# Patient Record
Sex: FEMALE | Race: WHITE | Employment: OTHER | URBAN - METROPOLITAN AREA
[De-identification: names, ages, dates, MRNs, and addresses within clinical notes are randomized per-mention and may not be internally consistent; named-entity substitution may affect disease eponyms.]

---

## 2017-01-04 ENCOUNTER — TELEPHONE (OUTPATIENT)
Dept: INTERNAL MEDICINE CLINIC | Facility: CLINIC | Age: 72
End: 2017-01-04

## 2017-01-04 DIAGNOSIS — Z12.31 ENCOUNTER FOR SCREENING MAMMOGRAM FOR MALIGNANT NEOPLASM OF BREAST: Primary | ICD-10-CM

## 2017-01-04 NOTE — TELEPHONE ENCOUNTER
Hui Jett is calling in requesting an order for her mammogram. She received a notice from [facility] that she is due for her annual.    Pt will call central scheduling tomorrow to schedule appointment.

## 2017-01-11 ENCOUNTER — HOSPITAL ENCOUNTER (OUTPATIENT)
Dept: MAMMOGRAPHY | Age: 72
Discharge: HOME | End: 2017-01-11
Attending: INTERNAL MEDICINE
Payer: MEDICARE

## 2017-01-11 DIAGNOSIS — Z12.31 ENCOUNTER FOR SCREENING MAMMOGRAM FOR MALIGNANT NEOPLASM OF BREAST: ICD-10-CM

## 2017-01-11 PROCEDURE — 77063 BREAST TOMOSYNTHESIS BI: CPT

## 2017-01-11 PROCEDURE — 77067 SCR MAMMO BI INCL CAD: CPT

## 2017-03-15 ENCOUNTER — LAB ENCOUNTER (OUTPATIENT)
Dept: LAB | Age: 72
End: 2017-03-15
Attending: OBSTETRICS & GYNECOLOGY
Payer: MEDICARE

## 2017-03-15 DIAGNOSIS — C57.00 MALIGNANT NEOPLASM OF FALLOPIAN TUBE (HCC): Primary | ICD-10-CM

## 2017-03-15 LAB — CANCER AG 125 (CA125): 6 U/ML (ref ?–35)

## 2017-03-15 PROCEDURE — 86304 IMMUNOASSAY TUMOR CA 125: CPT

## 2017-03-24 ENCOUNTER — APPOINTMENT (OUTPATIENT)
Dept: LAB | Facility: HOSPITAL | Age: 72
End: 2017-03-24
Attending: INTERNAL MEDICINE
Payer: MEDICARE

## 2017-03-24 DIAGNOSIS — E78.00 HYPERCHOLESTEROLEMIA: ICD-10-CM

## 2017-03-24 LAB
ALT SERPL-CCNC: 22 U/L (ref 14–54)
AST SERPL-CCNC: 22 U/L (ref 15–41)
CHOLEST SMN-MCNC: 172 MG/DL (ref ?–200)
HDLC SERPL-MCNC: 87 MG/DL (ref 45–?)
HDLC SERPL: 1.98 {RATIO} (ref ?–4.44)
HSCRP: 0.85 MG/L (ref ?–3)
LDLC SERPL CALC-MCNC: 76 MG/DL (ref ?–130)
NONHDLC SERPL-MCNC: 85 MG/DL (ref ?–130)
TRIGLYCERIDES: 46 MG/DL (ref ?–150)
VLDL: 9 MG/DL (ref 5–40)

## 2017-03-24 PROCEDURE — 86141 C-REACTIVE PROTEIN HS: CPT

## 2017-03-24 PROCEDURE — 80061 LIPID PANEL: CPT

## 2017-03-24 PROCEDURE — 84450 TRANSFERASE (AST) (SGOT): CPT

## 2017-03-24 PROCEDURE — 84460 ALANINE AMINO (ALT) (SGPT): CPT

## 2017-03-24 PROCEDURE — 36415 COLL VENOUS BLD VENIPUNCTURE: CPT

## 2017-04-04 ENCOUNTER — OFFICE VISIT (OUTPATIENT)
Dept: INTERNAL MEDICINE CLINIC | Facility: CLINIC | Age: 72
End: 2017-04-04

## 2017-04-04 VITALS
SYSTOLIC BLOOD PRESSURE: 140 MMHG | HEIGHT: 65 IN | TEMPERATURE: 98 F | BODY MASS INDEX: 29.66 KG/M2 | HEART RATE: 64 BPM | DIASTOLIC BLOOD PRESSURE: 84 MMHG | RESPIRATION RATE: 16 BRPM | WEIGHT: 178 LBS

## 2017-04-04 DIAGNOSIS — Z85.43 HISTORY OF OVARIAN CANCER: ICD-10-CM

## 2017-04-04 DIAGNOSIS — R09.89 LABILE HYPERTENSION: Primary | ICD-10-CM

## 2017-04-04 DIAGNOSIS — N39.46 MIXED STRESS AND URGE URINARY INCONTINENCE: ICD-10-CM

## 2017-04-04 PROCEDURE — 99213 OFFICE O/P EST LOW 20 MIN: CPT | Performed by: INTERNAL MEDICINE

## 2017-04-05 PROBLEM — R32 INCONTINENCE: Status: ACTIVE | Noted: 2017-04-05

## 2017-04-05 RX ORDER — MIRABEGRON 50 MG/1
50 TABLET, FILM COATED, EXTENDED RELEASE ORAL DAILY
Qty: 90 TABLET | Refills: 0 | Status: SHIPPED | OUTPATIENT
Start: 2017-04-05 | End: 2017-04-13

## 2017-04-06 ENCOUNTER — TELEPHONE (OUTPATIENT)
Dept: INTERNAL MEDICINE CLINIC | Facility: CLINIC | Age: 72
End: 2017-04-06

## 2017-04-06 NOTE — PROGRESS NOTES
Patient presents with:  Lab Results: Labs 3/24 Liver, lipids, CRP       HPI: Arnaldo Das is here for follow up of labs and last visit. She is doing well and has no health complaints.      Hx ovarian cancer:  She is following up with Dr. Radhika Guerra every four luc day Disp:  Rfl:    aspirin 81 MG Oral Tab Take 81 mg by mouth daily. Disp:  Rfl:    Diclofenac Sodium (VOLTAREN) 1 % Transdermal Gel Apply 2 g topically 4 (four) times daily.  Disp: 100 g Rfl: 0       PFHSH:   Family History   Problem Relation Age of Onset

## 2017-04-07 ENCOUNTER — MED REC SCAN ONLY (OUTPATIENT)
Dept: OBGYN CLINIC | Facility: CLINIC | Age: 72
End: 2017-04-07

## 2017-04-12 ENCOUNTER — TELEPHONE (OUTPATIENT)
Dept: INTERNAL MEDICINE CLINIC | Facility: CLINIC | Age: 72
End: 2017-04-12

## 2017-04-12 DIAGNOSIS — R09.89 LABILE HYPERTENSION: Primary | ICD-10-CM

## 2017-04-13 RX ORDER — MIRABEGRON 50 MG/1
50 TABLET, FILM COATED, EXTENDED RELEASE ORAL DAILY
Qty: 90 TABLET | Refills: 0 | Status: SHIPPED | OUTPATIENT
Start: 2017-04-13 | End: 2017-08-16

## 2017-04-20 ENCOUNTER — TELEPHONE (OUTPATIENT)
Dept: INTERNAL MEDICINE CLINIC | Facility: CLINIC | Age: 72
End: 2017-04-20

## 2017-04-20 NOTE — TELEPHONE ENCOUNTER
Erick Murdock is calling in requesting a new prescription to replace the prescription for Myrbetriq. Her new insurance will not cover the Myrbetriq and when she went to pick it up the price was $314.00 for 30 days.  She cannot afford $314.00 a month for this medic

## 2017-04-20 NOTE — TELEPHONE ENCOUNTER
Have her try the Toviaz. 4 mg. She can gaming with one a day but can take up to two a day. If two a day works she can get a refill for 8 mg tabs. Give her 30 and 3 refills.

## 2017-06-21 ENCOUNTER — LAB ENCOUNTER (OUTPATIENT)
Dept: LAB | Age: 72
End: 2017-06-21
Attending: OBSTETRICS & GYNECOLOGY
Payer: MEDICARE

## 2017-06-21 DIAGNOSIS — C57.00 MALIGNANT NEOPLASM OF FALLOPIAN TUBE (HCC): Primary | ICD-10-CM

## 2017-06-21 PROCEDURE — 86304 IMMUNOASSAY TUMOR CA 125: CPT

## 2017-07-10 ENCOUNTER — MED REC SCAN ONLY (OUTPATIENT)
Dept: OBGYN CLINIC | Facility: CLINIC | Age: 72
End: 2017-07-10

## 2017-07-10 ENCOUNTER — OFFICE VISIT (OUTPATIENT)
Dept: GENETICS | Facility: HOSPITAL | Age: 72
End: 2017-07-10
Attending: GENETIC COUNSELOR, MS
Payer: MEDICARE

## 2017-07-10 DIAGNOSIS — Z85.44 HISTORY OF CANCER OF FALLOPIAN TUBE IN ADULTHOOD: Primary | ICD-10-CM

## 2017-07-10 PROCEDURE — 96040 HC GENETIC COUNSELING EA 30 MIN: CPT | Performed by: GENETIC COUNSELOR, MS

## 2017-07-10 NOTE — PROGRESS NOTES
Referring Provider: Chinedu Walker MD    Additional Provider: Carmel Crooks MD    Reason for Referral:  Marci Kelley was referred for genetic counseling because of a personal history of fallopian tube (ovarian) cancer.   Ms. Rosenda Garner is a 71year-old woman of the [place], approximately 1 in 66-78 women will develop ovarian cancer. Risk factors for ovarian cancer include family history, age, nulliparity, and later childbearing (first full term delivery >27years of age).   Women whose mothers, sisters, or in one of these genes that Ms. Alexei Hirsch did not inherit. In this scenario, options for cancer screening/management should be determined according to personal and family histories and should be discussed with a physician.       A variant of uncertain significan conclusion of the counseling session Ms. Ivan Malin opted not to proceed with genetic testing. She states that if she pursues testing it would be because her children would find the information to be helpful.    Ms. Ivan Malin decided to discuss this testing with he

## 2017-08-08 ENCOUNTER — TELEPHONE (OUTPATIENT)
Dept: INTERNAL MEDICINE CLINIC | Facility: CLINIC | Age: 72
End: 2017-08-08

## 2017-08-08 NOTE — TELEPHONE ENCOUNTER
Patient has first appointment with Dr. Nyra Leventhal next week. She needs 7 days of Atorvastatin 80 mg 1 QD to get her through till her appointment. Her previous doctor has retired. [facility] on [address]. Please advise pt if we can do this for her.

## 2017-08-09 RX ORDER — ATORVASTATIN CALCIUM 80 MG/1
80 TABLET, FILM COATED ORAL NIGHTLY
Qty: 7 TABLET | Refills: 0 | Status: SHIPPED | OUTPATIENT
Start: 2017-08-09 | End: 2017-08-16

## 2017-08-09 NOTE — TELEPHONE ENCOUNTER
Took call from Dr Maricel Davies, she has spoken to Dr Pippa Disla directly about her patients and refilling medications. Information forwarded to Dr. Pippa Disla.

## 2017-08-10 ENCOUNTER — OFFICE VISIT (OUTPATIENT)
Dept: GENETICS | Facility: HOSPITAL | Age: 72
End: 2017-08-10
Attending: GENETIC COUNSELOR, MS
Payer: MEDICARE

## 2017-08-10 RX ORDER — ATORVASTATIN CALCIUM 80 MG/1
TABLET, FILM COATED ORAL
Qty: 90 TABLET | Refills: 0 | OUTPATIENT
Start: 2017-08-10

## 2017-08-10 NOTE — PROGRESS NOTES
Referring Provider:                 Dustin Whitfield MD     Additional Provider:               Tami Davenport MD     Reason for Referral:  Xin Alfonso returned today to have her blood drawn for genetic testing.   Ms. Bárbara Cabrera was seen on 7/10/17 for genetic co

## 2017-08-10 NOTE — TELEPHONE ENCOUNTER
Noted below. Okay to refill medication given that pt has appt to set up care. Looks like rx was already done.

## 2017-08-16 ENCOUNTER — OFFICE VISIT (OUTPATIENT)
Dept: INTERNAL MEDICINE CLINIC | Facility: CLINIC | Age: 72
End: 2017-08-16

## 2017-08-16 VITALS
HEART RATE: 64 BPM | BODY MASS INDEX: 29.51 KG/M2 | DIASTOLIC BLOOD PRESSURE: 78 MMHG | TEMPERATURE: 97 F | SYSTOLIC BLOOD PRESSURE: 130 MMHG | WEIGHT: 177.13 LBS | HEIGHT: 65 IN | RESPIRATION RATE: 12 BRPM

## 2017-08-16 DIAGNOSIS — E78.00 HYPERCHOLESTEROLEMIA: ICD-10-CM

## 2017-08-16 DIAGNOSIS — Z78.0 POSTMENOPAUSAL: ICD-10-CM

## 2017-08-16 DIAGNOSIS — F41.9 ANXIETY: ICD-10-CM

## 2017-08-16 DIAGNOSIS — I25.10 NON-OCCLUSIVE CORONARY ARTERY DISEASE: ICD-10-CM

## 2017-08-16 DIAGNOSIS — M25.561 CHRONIC PAIN OF RIGHT KNEE: ICD-10-CM

## 2017-08-16 DIAGNOSIS — R09.89 LABILE HYPERTENSION: Primary | ICD-10-CM

## 2017-08-16 DIAGNOSIS — R73.03 PRE-DIABETES: ICD-10-CM

## 2017-08-16 DIAGNOSIS — E03.9 HYPOTHYROIDISM, UNSPECIFIED TYPE: ICD-10-CM

## 2017-08-16 DIAGNOSIS — R20.2 PARESTHESIA: ICD-10-CM

## 2017-08-16 DIAGNOSIS — M85.80 OSTEOPENIA, UNSPECIFIED LOCATION: ICD-10-CM

## 2017-08-16 DIAGNOSIS — G89.29 CHRONIC PAIN OF RIGHT KNEE: ICD-10-CM

## 2017-08-16 PROCEDURE — 99214 OFFICE O/P EST MOD 30 MIN: CPT | Performed by: INTERNAL MEDICINE

## 2017-08-16 RX ORDER — LISINOPRIL 40 MG/1
40 TABLET ORAL DAILY
Qty: 90 TABLET | Refills: 2 | Status: SHIPPED | OUTPATIENT
Start: 2017-08-16 | End: 2018-06-15

## 2017-08-16 RX ORDER — METOPROLOL SUCCINATE 50 MG/1
50 TABLET, EXTENDED RELEASE ORAL DAILY
Qty: 90 TABLET | Refills: 2 | Status: SHIPPED | OUTPATIENT
Start: 2017-08-16 | End: 2018-03-06

## 2017-08-16 RX ORDER — ATORVASTATIN CALCIUM 80 MG/1
TABLET, FILM COATED ORAL
Qty: 90 TABLET | Refills: 0 | OUTPATIENT
Start: 2017-08-16

## 2017-08-16 RX ORDER — ATORVASTATIN CALCIUM 80 MG/1
80 TABLET, FILM COATED ORAL NIGHTLY
Qty: 7 TABLET | Refills: 0 | Status: SHIPPED | OUTPATIENT
Start: 2017-08-16 | End: 2017-08-16

## 2017-08-16 RX ORDER — ATORVASTATIN CALCIUM 80 MG/1
80 TABLET, FILM COATED ORAL NIGHTLY
Qty: 90 TABLET | Refills: 2 | Status: SHIPPED | OUTPATIENT
Start: 2017-08-16 | End: 2018-05-07

## 2017-08-16 RX ORDER — LEVOTHYROXINE SODIUM 0.07 MG/1
75 TABLET ORAL DAILY
Qty: 90 TABLET | Refills: 2 | Status: SHIPPED | OUTPATIENT
Start: 2017-08-16 | End: 2018-05-21

## 2017-08-16 NOTE — PROGRESS NOTES
[facility]    CHIEF COMPLAINT:  Patient presents with:  Medication Follow-Up: pt wants to know what she can take for osteoporosis? Dr. James Zapien had her stop Fosomax.         HISTORY OF PRESENT ILLNESS:  The patient is a 71year old year old female wh with her hands. Symptoms are off and on. No weakness. Pre-diabetes: a1c 5.7. She is watching her diet. She has had some knee pain off and on. No pain at this time. She will contact us if she has this. May need a course of PT.        Past Medical Hist 08/16/2017  Allergen                          Noted       Reaction  CRABS (CRUSTACEANS)               03/20/2015      Review Complete  08/16/2017      SOCIAL HISTORY:    Social History  Social History   Marital status:   Spouse name: Christa Rosario) Yes    BODY HABITUS AND NUTRITION STATUS:  Body mass index is 29.47 kg/m².     PHYSICAL EXAM:   /78 (BP Location: Left arm, Patient Position: Sitting, Cuff Size: adult)   Pulse 64   Temp (!) 97.4 °F (36.3 °C) (Oral)   Resp 12   Ht 65\"   Wt 177 lb 1.6 current meds. - lisinopril 40 MG Oral Tab; Take 1 tablet (40 mg total) by mouth daily. Dispense: 90 tablet; Refill: 2  - Metoprolol Succinate ER 50 MG Oral Tablet 24 Hr; Take 1 tablet (50 mg total) by mouth daily. Dispense: 90 tablet; Refill: 2    2.  H

## 2017-08-28 ENCOUNTER — GENETICS ENCOUNTER (OUTPATIENT)
Dept: HEMATOLOGY/ONCOLOGY | Facility: HOSPITAL | Age: 72
End: 2017-08-28

## 2017-08-28 NOTE — PROGRESS NOTES
Referring Provider: Cayla Alcaraz MD    Additional Provider: Ignacio Mott MD    Reason for Referral:  [facility] had genetic testing performed on 8/10/17 because of a personal history of fallopian tube cancer at age 72.      Genetic Testing Result:  N

## 2017-09-19 ENCOUNTER — APPOINTMENT (OUTPATIENT)
Dept: LAB | Facility: HOSPITAL | Age: 72
End: 2017-09-19
Attending: INTERNAL MEDICINE
Payer: MEDICARE

## 2017-09-19 DIAGNOSIS — E78.00 HYPERCHOLESTEROLEMIA: ICD-10-CM

## 2017-09-19 DIAGNOSIS — R73.03 PRE-DIABETES: ICD-10-CM

## 2017-09-19 DIAGNOSIS — E03.9 HYPOTHYROIDISM, UNSPECIFIED TYPE: ICD-10-CM

## 2017-09-19 LAB
ALBUMIN SERPL-MCNC: 3.6 G/DL (ref 3.5–4.8)
ALP LIVER SERPL-CCNC: 68 U/L (ref 55–142)
ALT SERPL-CCNC: 26 U/L (ref 14–54)
AST SERPL-CCNC: 21 U/L (ref 15–41)
BILIRUB SERPL-MCNC: 0.6 MG/DL (ref 0.1–2)
BUN BLD-MCNC: 13 MG/DL (ref 8–20)
CALCIUM BLD-MCNC: 9.4 MG/DL (ref 8.3–10.3)
CHLORIDE: 105 MMOL/L (ref 101–111)
CHOLEST SMN-MCNC: 154 MG/DL (ref ?–200)
CO2: 30 MMOL/L (ref 22–32)
CREAT BLD-MCNC: 0.76 MG/DL (ref 0.55–1.02)
EST. AVERAGE GLUCOSE BLD GHB EST-MCNC: 128 MG/DL (ref 68–126)
GLUCOSE BLD-MCNC: 89 MG/DL (ref 70–99)
HBA1C MFR BLD HPLC: 6.1 % (ref ?–5.7)
HDLC SERPL-MCNC: 85 MG/DL (ref 45–?)
HDLC SERPL: 1.81 {RATIO} (ref ?–4.44)
LDLC SERPL CALC-MCNC: 58 MG/DL (ref ?–130)
LDLC SERPL-MCNC: 11 MG/DL (ref 5–40)
M PROTEIN MFR SERPL ELPH: 7.5 G/DL (ref 6.1–8.3)
NONHDLC SERPL-MCNC: 69 MG/DL (ref ?–130)
POTASSIUM SERPL-SCNC: 4 MMOL/L (ref 3.6–5.1)
SODIUM SERPL-SCNC: 140 MMOL/L (ref 136–144)
TRIGLYCERIDES: 53 MG/DL (ref ?–150)
TSI SER-ACNC: 1.04 MIU/ML (ref 0.35–5.5)

## 2017-09-19 PROCEDURE — 84443 ASSAY THYROID STIM HORMONE: CPT

## 2017-09-19 PROCEDURE — 83036 HEMOGLOBIN GLYCOSYLATED A1C: CPT

## 2017-09-19 PROCEDURE — 80053 COMPREHEN METABOLIC PANEL: CPT

## 2017-09-19 PROCEDURE — 36415 COLL VENOUS BLD VENIPUNCTURE: CPT

## 2017-09-19 PROCEDURE — 80061 LIPID PANEL: CPT

## 2017-09-20 ENCOUNTER — LAB ENCOUNTER (OUTPATIENT)
Dept: LAB | Age: 72
End: 2017-09-20
Attending: OBSTETRICS & GYNECOLOGY
Payer: MEDICARE

## 2017-09-20 DIAGNOSIS — C57.00 MALIGNANT NEOPLASM OF FALLOPIAN TUBE (HCC): Primary | ICD-10-CM

## 2017-09-20 LAB — CANCER AG 125 (CA125): 6.3 U/ML (ref ?–35)

## 2017-09-20 PROCEDURE — 86304 IMMUNOASSAY TUMOR CA 125: CPT

## 2017-10-19 ENCOUNTER — OFFICE VISIT (OUTPATIENT)
Dept: INTERNAL MEDICINE CLINIC | Facility: CLINIC | Age: 72
End: 2017-10-19

## 2017-10-19 VITALS
BODY MASS INDEX: 29.55 KG/M2 | HEIGHT: 65 IN | WEIGHT: 177.38 LBS | SYSTOLIC BLOOD PRESSURE: 160 MMHG | DIASTOLIC BLOOD PRESSURE: 98 MMHG | TEMPERATURE: 98 F | RESPIRATION RATE: 12 BRPM | HEART RATE: 64 BPM

## 2017-10-19 DIAGNOSIS — Z00.00 ENCOUNTER FOR MEDICARE ANNUAL WELLNESS EXAM: ICD-10-CM

## 2017-10-19 DIAGNOSIS — Z11.59 NEED FOR HEPATITIS C SCREENING TEST: ICD-10-CM

## 2017-10-19 DIAGNOSIS — C57.00 MALIGNANT NEOPLASM OF FALLOPIAN TUBE, UNSPECIFIED LATERALITY (HCC): ICD-10-CM

## 2017-10-19 DIAGNOSIS — R26.89 BALANCE PROBLEM: ICD-10-CM

## 2017-10-19 DIAGNOSIS — R09.89 LABILE HYPERTENSION: ICD-10-CM

## 2017-10-19 DIAGNOSIS — Z12.31 ENCOUNTER FOR SCREENING MAMMOGRAM FOR BREAST CANCER: ICD-10-CM

## 2017-10-19 DIAGNOSIS — G62.9 NEUROPATHY: ICD-10-CM

## 2017-10-19 PROCEDURE — G0439 PPPS, SUBSEQ VISIT: HCPCS | Performed by: INTERNAL MEDICINE

## 2017-10-19 PROCEDURE — 99214 OFFICE O/P EST MOD 30 MIN: CPT | Performed by: INTERNAL MEDICINE

## 2017-10-19 NOTE — PATIENT INSTRUCTIONS
Yi FELICIAN[facility]'s SCREENING SCHEDULE   Tests on this list are recommended by your physician but may not be covered, or covered at this frequency, by your insurer. Please check with your insurance carrier before scheduling to verify coverage.    PREVENTATIV results found for this or any previous visit.  Limited to patients who meet one of the following criteria:   • Men who are 73-68 years old and have smoked more than 100 cigarettes in their lifetime   • Anyone with a family history   NA   Colorectal Cancer S and as needed after 75 Mammogram,1 Yr due on 01/11/2018 Please get this Mammogram regularly  ORDERED   Immunizations      Influenza  Covered Annually No orders found for this or any previous visit.  Please get every year    Pneumococcal 13 (Prevnar)  Covere print using their home computer and printer. (the forms are also available in [place]  www. [email or web address] org  This link also has information from the [facility] regarding Advance Directives.

## 2017-10-19 NOTE — PROGRESS NOTES
HPI:   Roxy Casarez is a 71year old female who presents for a Medicare Subsequent Annual Wellness visit (Pt already had Initial Annual Wellness). Mammo due in 1/2018  dexa due. She will do this. Colonoscopy up to date. Pap done by gyne dr. Rona Pascal. 09/19/2017   ALT 26 09/19/2017   CA 9.4 09/19/2017   ALB 3.6 09/19/2017   TSH 1.040 09/19/2017   CREATSERUM 0.76 09/19/2017   GLU 89 09/19/2017        CBC  (most recent labs)     Lab Results  Component Value Date   WBC 6.0 10/04/2016   HGB 13.4 10/04/2016 REVIEW OF SYSTEMS:   GENERAL: feels well otherwise  SKIN: denies any unusual skin lesions  EYES: denies blurred vision or double vision  HEENT: denies nasal congestion, sinus pain or ST  LUNGS: denies shortness of breath with exertion  CARDIOVASCULAR: Zoster (Shingles) 04/26/2011       ASSESSMENT AND OTHER RELEVANT CHRONIC CONDITIONS:   Rain Francis is a 71year old female who presents for a Medicare Assessment.      PLAN SUMMARY:   Diagnoses and all orders for this visit:    Encounter for Medicare domenic trying?: 2 - No    Has your appetite been poor?: No    How does the patient maintain a good energy level?: Appropriate Exercise    How would you describe your daily physical activity?: Moderate (light to moderate)    How would you describe your current hea it?: Correct    What year is it?: Correct    Recall \"Ball\": Correct    Recall \"Flag\": Correct    Recall \"Tree\": Correct       This section provided for quick review of chart, separate sheet to patient  PREVENTATIVE SERVICES  INDICATIONS AND SCHEDULE Health Maintenance if applicable     Immunizations (Update Immunization Activity if applicable)     Influenza  Covered Annually 9/29/2016 Please get every year    Pneumococcal 13 (Prevnar)  Covered Once after 65 10/13/2015 Please get once after your 65th b Date Value   09/19/2017 6.1 (H)    No flowsheet data found. Creat/alb ratio  Annually      LDL  Annually LDL CHOLESTROL (mg/dL)   Date Value   01/09/2014 95     LDL Cholesterol (mg/dL)   Date Value   09/19/2017 58    No flowsheet data found.      Jena Edwards

## 2017-10-23 ENCOUNTER — HOSPITAL ENCOUNTER (OUTPATIENT)
Dept: BONE DENSITY | Age: 72
Discharge: HOME | End: 2017-10-23
Attending: INTERNAL MEDICINE
Payer: MEDICARE

## 2017-10-23 DIAGNOSIS — Z78.0 POSTMENOPAUSAL: ICD-10-CM

## 2017-10-23 PROCEDURE — 77080 DXA BONE DENSITY AXIAL: CPT | Performed by: INTERNAL MEDICINE

## 2017-10-31 ENCOUNTER — OFFICE VISIT (OUTPATIENT)
Dept: FAMILY MEDICINE CLINIC | Facility: CLINIC | Age: 72
End: 2017-10-31

## 2017-10-31 VITALS — SYSTOLIC BLOOD PRESSURE: 108 MMHG | DIASTOLIC BLOOD PRESSURE: 76 MMHG

## 2017-10-31 DIAGNOSIS — Z01.30 BLOOD PRESSURE CHECK: Primary | ICD-10-CM

## 2017-12-15 ENCOUNTER — OFFICE VISIT (OUTPATIENT)
Dept: FAMILY MEDICINE CLINIC | Facility: CLINIC | Age: 72
End: 2017-12-15

## 2017-12-15 VITALS — SYSTOLIC BLOOD PRESSURE: 142 MMHG | DIASTOLIC BLOOD PRESSURE: 84 MMHG

## 2017-12-15 DIAGNOSIS — Z01.30 BP CHECK: Primary | ICD-10-CM

## 2017-12-16 NOTE — PROGRESS NOTES
Patient here for BP check, denies dizziness or headache. Reports history of hypertension. Pt takes lisinotpril 40 and metoprolol 50. Pt was told by pcp to have bp checked frequently.     12/15/17  1247   BP: 142/84     Advised to f/u with PCP at previously

## 2017-12-20 ENCOUNTER — LAB ENCOUNTER (OUTPATIENT)
Dept: LAB | Age: 72
End: 2017-12-20
Attending: OBSTETRICS & GYNECOLOGY
Payer: MEDICARE

## 2017-12-20 DIAGNOSIS — C57.00 MALIGNANT NEOPLASM OF FALLOPIAN TUBE (HCC): Primary | ICD-10-CM

## 2017-12-20 PROCEDURE — 86304 IMMUNOASSAY TUMOR CA 125: CPT

## 2018-01-12 ENCOUNTER — HOSPITAL ENCOUNTER (OUTPATIENT)
Dept: MAMMOGRAPHY | Facility: HOSPITAL | Age: 73
Discharge: HOME | End: 2018-01-12
Attending: INTERNAL MEDICINE
Payer: MEDICARE

## 2018-01-12 DIAGNOSIS — Z12.31 ENCOUNTER FOR SCREENING MAMMOGRAM FOR BREAST CANCER: ICD-10-CM

## 2018-01-12 PROCEDURE — 77067 SCR MAMMO BI INCL CAD: CPT | Performed by: INTERNAL MEDICINE

## 2018-01-22 ENCOUNTER — TELEPHONE (OUTPATIENT)
Dept: INTERNAL MEDICINE CLINIC | Facility: CLINIC | Age: 73
End: 2018-01-22

## 2018-01-22 NOTE — TELEPHONE ENCOUNTER
Incoming (mail or fax):  fax  Received from:  Gynecologic Oncology  Documentation given to:  Dr Jos Edmond

## 2018-02-06 ENCOUNTER — OFFICE VISIT (OUTPATIENT)
Dept: INTERNAL MEDICINE CLINIC | Facility: CLINIC | Age: 73
End: 2018-02-06

## 2018-02-06 VITALS
HEIGHT: 65 IN | RESPIRATION RATE: 12 BRPM | TEMPERATURE: 98 F | HEART RATE: 62 BPM | BODY MASS INDEX: 29.24 KG/M2 | DIASTOLIC BLOOD PRESSURE: 90 MMHG | SYSTOLIC BLOOD PRESSURE: 160 MMHG | WEIGHT: 175.5 LBS

## 2018-02-06 DIAGNOSIS — I10 ESSENTIAL HYPERTENSION: ICD-10-CM

## 2018-02-06 DIAGNOSIS — E78.00 HYPERCHOLESTEROLEMIA: ICD-10-CM

## 2018-02-06 DIAGNOSIS — I10 ESSENTIAL HYPERTENSION: Primary | ICD-10-CM

## 2018-02-06 DIAGNOSIS — R73.03 PRE-DIABETES: ICD-10-CM

## 2018-02-06 DIAGNOSIS — I25.10 NON-OCCLUSIVE CORONARY ARTERY DISEASE: ICD-10-CM

## 2018-02-06 DIAGNOSIS — E03.9 HYPOTHYROIDISM, UNSPECIFIED TYPE: ICD-10-CM

## 2018-02-06 PROCEDURE — 99214 OFFICE O/P EST MOD 30 MIN: CPT | Performed by: INTERNAL MEDICINE

## 2018-02-06 RX ORDER — HYDROCHLOROTHIAZIDE 25 MG/1
TABLET ORAL
Qty: 90 TABLET | Refills: 1 | OUTPATIENT
Start: 2018-02-06

## 2018-02-06 RX ORDER — HYDROCHLOROTHIAZIDE 25 MG/1
25 TABLET ORAL DAILY
Qty: 30 TABLET | Refills: 1 | Status: SHIPPED | OUTPATIENT
Start: 2018-02-06 | End: 2018-03-06

## 2018-02-06 NOTE — PROGRESS NOTES
[facility]    CHIEF COMPLAINT:  Patient presents with:  Blood Pressure        HISTORY OF PRESENT ILLNESS:  Here for follow up. Htn: still elevated in office today. Readings at home are mostly okay but has had high readings this month.  Readings distress  HEENT: Oropharynx normal. No tonsillar enlargement or exudates.    LUNGS: clear to auscultation  CARDIO: RRR without murmur  GI: good BS's,no masses, HSM or tenderness  MUSCULOSKELETAL:  no edema, muscle strength normal.     DATA:    Results for o

## 2018-02-28 ENCOUNTER — PATIENT MESSAGE (OUTPATIENT)
Dept: INTERNAL MEDICINE CLINIC | Facility: CLINIC | Age: 73
End: 2018-02-28

## 2018-02-28 NOTE — TELEPHONE ENCOUNTER
From: [facility]  To: Meryle Donning, MD  Sent: 2/28/2018 12:32 PM CST  Subject: Visit Follow-up Question    Do I need to fast for labs ordered on 2/6/2018.

## 2018-03-02 ENCOUNTER — APPOINTMENT (OUTPATIENT)
Dept: LAB | Age: 73
End: 2018-03-02
Attending: INTERNAL MEDICINE
Payer: MEDICARE

## 2018-03-02 DIAGNOSIS — E03.9 HYPOTHYROIDISM, UNSPECIFIED TYPE: ICD-10-CM

## 2018-03-02 DIAGNOSIS — I10 ESSENTIAL HYPERTENSION: ICD-10-CM

## 2018-03-02 DIAGNOSIS — R73.03 PRE-DIABETES: ICD-10-CM

## 2018-03-02 DIAGNOSIS — Z11.59 NEED FOR HEPATITIS C SCREENING TEST: ICD-10-CM

## 2018-03-02 LAB
ALBUMIN SERPL-MCNC: 3.6 G/DL (ref 3.5–4.8)
ALP LIVER SERPL-CCNC: 65 U/L (ref 55–142)
ALT SERPL-CCNC: 23 U/L (ref 14–54)
AST SERPL-CCNC: 20 U/L (ref 15–41)
BILIRUB SERPL-MCNC: 0.5 MG/DL (ref 0.1–2)
BUN BLD-MCNC: 15 MG/DL (ref 8–20)
CALCIUM BLD-MCNC: 9.2 MG/DL (ref 8.3–10.3)
CHLORIDE: 105 MMOL/L (ref 101–111)
CHOLEST SMN-MCNC: 153 MG/DL (ref ?–200)
CO2: 28 MMOL/L (ref 22–32)
CREAT BLD-MCNC: 0.79 MG/DL (ref 0.55–1.02)
EST. AVERAGE GLUCOSE BLD GHB EST-MCNC: 126 MG/DL (ref 68–126)
GLUCOSE BLD-MCNC: 73 MG/DL (ref 70–99)
HBA1C MFR BLD HPLC: 6 % (ref ?–5.7)
HDLC SERPL-MCNC: 84 MG/DL (ref 45–?)
HDLC SERPL: 1.82 {RATIO} (ref ?–4.44)
LDLC SERPL CALC-MCNC: 59 MG/DL (ref ?–130)
M PROTEIN MFR SERPL ELPH: 7.4 G/DL (ref 6.1–8.3)
NONHDLC SERPL-MCNC: 69 MG/DL (ref ?–130)
POTASSIUM SERPL-SCNC: 3.8 MMOL/L (ref 3.6–5.1)
SODIUM SERPL-SCNC: 139 MMOL/L (ref 136–144)
TRIGL SERPL-MCNC: 51 MG/DL (ref ?–150)
TSI SER-ACNC: 1.51 MIU/ML (ref 0.35–5.5)
VLDLC SERPL CALC-MCNC: 10 MG/DL (ref 5–40)

## 2018-03-02 PROCEDURE — 80053 COMPREHEN METABOLIC PANEL: CPT

## 2018-03-02 PROCEDURE — 84443 ASSAY THYROID STIM HORMONE: CPT

## 2018-03-02 PROCEDURE — 36415 COLL VENOUS BLD VENIPUNCTURE: CPT

## 2018-03-02 PROCEDURE — 86803 HEPATITIS C AB TEST: CPT

## 2018-03-02 PROCEDURE — 83036 HEMOGLOBIN GLYCOSYLATED A1C: CPT

## 2018-03-02 PROCEDURE — 80061 LIPID PANEL: CPT

## 2018-03-03 LAB — HEPATITIS C VIRUS AB INTERPRETATION: NONREACTIVE

## 2018-03-06 ENCOUNTER — OFFICE VISIT (OUTPATIENT)
Dept: INTERNAL MEDICINE CLINIC | Facility: CLINIC | Age: 73
End: 2018-03-06

## 2018-03-06 VITALS
BODY MASS INDEX: 29.11 KG/M2 | TEMPERATURE: 98 F | HEIGHT: 65 IN | DIASTOLIC BLOOD PRESSURE: 84 MMHG | WEIGHT: 174.69 LBS | RESPIRATION RATE: 12 BRPM | HEART RATE: 60 BPM | SYSTOLIC BLOOD PRESSURE: 148 MMHG

## 2018-03-06 DIAGNOSIS — R73.03 PRE-DIABETES: ICD-10-CM

## 2018-03-06 DIAGNOSIS — E78.00 HYPERCHOLESTEROLEMIA: ICD-10-CM

## 2018-03-06 DIAGNOSIS — R09.89 LABILE HYPERTENSION: ICD-10-CM

## 2018-03-06 PROCEDURE — 99213 OFFICE O/P EST LOW 20 MIN: CPT | Performed by: INTERNAL MEDICINE

## 2018-03-06 RX ORDER — METOPROLOL SUCCINATE 50 MG/1
75 TABLET, EXTENDED RELEASE ORAL DAILY
Qty: 135 TABLET | Refills: 0 | Status: SHIPPED | OUTPATIENT
Start: 2018-03-06 | End: 2018-04-19 | Stop reason: DRUGHIGH

## 2018-03-06 NOTE — PROGRESS NOTES
[facility]    CHIEF COMPLAINT:  Patient presents with:  Medication Follow-Up: brought a copy of her health care power of . HISTORY OF PRESENT ILLNESS:  Here for follow up. Htn: readings have been labile at home.  Highest has been well developed, well nourished,in no apparent distress  LUNGS: clear to auscultation  CARDIO: RRR without murmur    DATA:    Results for orders placed or performed in visit on 03/02/18  -LIPID PANEL   Result Value Ref Range   Cholesterol, Total 153 <200 mg

## 2018-03-16 ENCOUNTER — PATIENT MESSAGE (OUTPATIENT)
Dept: INTERNAL MEDICINE CLINIC | Facility: CLINIC | Age: 73
End: 2018-03-16

## 2018-03-16 ENCOUNTER — OFFICE VISIT (OUTPATIENT)
Dept: FAMILY MEDICINE CLINIC | Facility: CLINIC | Age: 73
End: 2018-03-16

## 2018-03-16 VITALS
HEART RATE: 69 BPM | DIASTOLIC BLOOD PRESSURE: 78 MMHG | SYSTOLIC BLOOD PRESSURE: 138 MMHG | RESPIRATION RATE: 16 BRPM | TEMPERATURE: 98 F | OXYGEN SATURATION: 98 %

## 2018-03-16 DIAGNOSIS — N30.01 ACUTE CYSTITIS WITH HEMATURIA: Primary | ICD-10-CM

## 2018-03-16 DIAGNOSIS — R39.15 URINARY URGENCY: ICD-10-CM

## 2018-03-16 LAB
APPEARANCE: CLEAR
BILIRUBIN: NEGATIVE
GLUCOSE (URINE DIPSTICK): NEGATIVE MG/DL
KETONES (URINE DIPSTICK): NEGATIVE MG/DL
MULTISTIX LOT#: ABNORMAL NUMERIC
NITRITE, URINE: NEGATIVE
PH, URINE: 6 (ref 4.5–8)
PROTEIN (URINE DIPSTICK): NEGATIVE MG/DL
SPECIFIC GRAVITY: 1.01 (ref 1–1.03)
URINE-COLOR: YELLOW
UROBILINOGEN,SEMI-QN: 0.2 MG/DL (ref 0–1.9)

## 2018-03-16 PROCEDURE — 99213 OFFICE O/P EST LOW 20 MIN: CPT | Performed by: PHYSICIAN ASSISTANT

## 2018-03-16 PROCEDURE — 81003 URINALYSIS AUTO W/O SCOPE: CPT | Performed by: PHYSICIAN ASSISTANT

## 2018-03-16 RX ORDER — SULFAMETHOXAZOLE AND TRIMETHOPRIM 800; 160 MG/1; MG/1
1 TABLET ORAL 2 TIMES DAILY
Qty: 10 TABLET | Refills: 0 | Status: SHIPPED | OUTPATIENT
Start: 2018-03-16 | End: 2018-03-21

## 2018-03-16 NOTE — TELEPHONE ENCOUNTER
From: [facility]  To: Mustapha Morrison MD  Sent: 3/16/2018 9:00 AM CDT  Subject: Non-Urgent Medical Question    Good a.m.,  I am having symptoms of frequent and urgent urination w/some lower back discomfort.  could be a urinary tract infection but may be u

## 2018-03-16 NOTE — PROGRESS NOTES
CHIEF COMPLAINT:     Patient presents with:  Urinary: 3 days, no blood in the urine, no fevers       HPI:    Roxy Casarez is a 71year old female who presents with symptoms of UTI. Complaining of urinary frequency and urgency for last 3 days. No dysuria. 1c, fallopian tube cancer, Dr. Jazlyn De Guzman,                robotic, EDW  2015: OOPHORECTOMY  No date: OTHER SURGICAL HISTORY      Comment: bilateral cataract surgery  No date: OTHER SURGICAL HISTORY      Comment: arthritic cyst removed from finger  No date: OTH Negative Negative mg/dL   SPECIFIC GRAVITY 1.015 1.005 - 1.030   OCCULT BLOOD small Negative   PH, URINE 6.0 4.5 - 8.0   PROTEIN (URINE DIPSTICK) negative Negative/Trace mg/dL   UROBILINOGEN,SEMI-QN 0.2 0.0 - 1.9 mg/dL   NITRITE, URINE negative Negative

## 2018-03-20 ENCOUNTER — OFFICE VISIT (OUTPATIENT)
Dept: INTERNAL MEDICINE CLINIC | Facility: CLINIC | Age: 73
End: 2018-03-20

## 2018-03-20 VITALS
HEART RATE: 72 BPM | RESPIRATION RATE: 12 BRPM | SYSTOLIC BLOOD PRESSURE: 136 MMHG | WEIGHT: 174.31 LBS | HEIGHT: 65 IN | TEMPERATURE: 98 F | BODY MASS INDEX: 29.04 KG/M2 | DIASTOLIC BLOOD PRESSURE: 82 MMHG

## 2018-03-20 DIAGNOSIS — R09.89 LABILE HYPERTENSION: ICD-10-CM

## 2018-03-20 PROCEDURE — 99213 OFFICE O/P EST LOW 20 MIN: CPT | Performed by: INTERNAL MEDICINE

## 2018-03-20 NOTE — PROGRESS NOTES
[facility]    CHIEF COMPLAINT:  Patient presents with:  Blood Pressure        HISTORY OF PRESENT ILLNESS:  Here for follow up.   htn: still elevated readings at home about 30% of the time. She is tolerating meds well.  No chest pain, no shortness W/O SCOPE   Result Value Ref Range   GLUCOSE (URINE DIPSTICK) Negative mg/dL   BILIRUBIN Negative Negative   KETONES (URINE DIPSTICK) Negative Negative mg/dL   SPECIFIC GRAVITY 1.015 1.005 - 1.030   OCCULT BLOOD small Negative   PH, URINE 6.0 4.5 - 8.0   P

## 2018-03-21 ENCOUNTER — LAB ENCOUNTER (OUTPATIENT)
Dept: LAB | Age: 73
End: 2018-03-21
Attending: OBSTETRICS & GYNECOLOGY
Payer: MEDICARE

## 2018-03-21 ENCOUNTER — PATIENT MESSAGE (OUTPATIENT)
Dept: INTERNAL MEDICINE CLINIC | Facility: CLINIC | Age: 73
End: 2018-03-21

## 2018-03-21 DIAGNOSIS — C56.9 MALIGNANT NEOPLASM OF OVARY (HCC): ICD-10-CM

## 2018-03-21 DIAGNOSIS — C57.00 MALIGNANT NEOPLASM OF FALLOPIAN TUBE (HCC): Primary | ICD-10-CM

## 2018-03-21 PROCEDURE — 86304 IMMUNOASSAY TUMOR CA 125: CPT

## 2018-03-21 NOTE — TELEPHONE ENCOUNTER
From: [facility]  To: Shalom Hagan MD  Sent: 3/21/2018 8:46 AM CDT  Subject: Prescription Question    Good a.m.,    I have a 4 week supply of Metoprolol at 2 50 mg pills a day. Will contact you via My Chart then for a refil.   Wishing you a wonderful t

## 2018-03-21 NOTE — TELEPHONE ENCOUNTER
Pt has 4 weeks of medications left if she takes 2 pills a day of the metoprolol. She will touch base with us when she needs a refill. At that time we should do a 100mg pill if she is tolerating the two 50mg pills. Note sent to pt.

## 2018-03-22 LAB
CANCER AG 125 (CA125): 2.8 U/ML (ref ?–35)
CANCER AG 125: 4.3 U/ML (ref ?–35)

## 2018-04-17 ENCOUNTER — TELEPHONE (OUTPATIENT)
Dept: INTERNAL MEDICINE CLINIC | Facility: CLINIC | Age: 73
End: 2018-04-17

## 2018-04-17 NOTE — PROGRESS NOTES
Progress report received from Dr. Randi Ascencio and scanned to [address] [place] [address] Rd

## 2018-04-17 NOTE — TELEPHONE ENCOUNTER
Incoming (mail or fax):  fax  Received from:  Gynecologic Oncology  Documentation given to:  Dr Samreen Driscoll

## 2018-04-18 DIAGNOSIS — R09.89 LABILE HYPERTENSION: ICD-10-CM

## 2018-04-18 NOTE — TELEPHONE ENCOUNTER
3/20/18 OV states to increase dose to 2 tablets daily. Metoprolol pended for 2 tabs daily.     Last OV relevant to medication: 3/20/18  Last refill date: 3/6/18     #/refills: 135 tabs (1-1/2 tab daily)  When pt was asked to return for OV: 4 months  Upcomin

## 2018-04-18 NOTE — TELEPHONE ENCOUNTER
From: Luc Barajas  Sent: 4/18/2018 9:55 AM CDT  Subject: Medication Renewal Request    Luc Barajas would like a refill of the following medications:     Metoprolol Succinate ER 50 MG Oral Tablet 24 Hr Brandon Bello MD]   Patient Comment: Dr. Corinne azar

## 2018-04-19 ENCOUNTER — TELEPHONE (OUTPATIENT)
Dept: INTERNAL MEDICINE CLINIC | Facility: CLINIC | Age: 73
End: 2018-04-19

## 2018-04-19 RX ORDER — METOPROLOL SUCCINATE 50 MG/1
TABLET, EXTENDED RELEASE ORAL
Qty: 180 TABLET | Refills: 0 | Status: SHIPPED | OUTPATIENT
Start: 2018-04-19 | End: 2018-07-13

## 2018-04-19 RX ORDER — METOPROLOL SUCCINATE 50 MG/1
75 TABLET, EXTENDED RELEASE ORAL DAILY
Qty: 135 TABLET | Refills: 0
Start: 2018-04-19

## 2018-04-19 RX ORDER — METOPROLOL SUCCINATE 50 MG/1
100 TABLET, EXTENDED RELEASE ORAL DAILY
Qty: 180 TABLET | Refills: 0 | Status: SHIPPED
Start: 2018-04-19 | End: 2018-07-13

## 2018-04-19 NOTE — TELEPHONE ENCOUNTER
OV note from 03/20/18 states to increase the Metoprolol to 50 mg two (2) tablets daily. Ordered Metoprolol 50 mg two (2) tablets daily #180 with 0 refills. Discontinued prior order for Metoprolol 75 mg daily.

## 2018-04-19 NOTE — TELEPHONE ENCOUNTER
Last OV relevant to medication: 3/20/18  Last refill date: 3/6/18   #135/refills: 0  When pt was asked to return for OV:4 months  Upcoming appt/reason: 7/13/18  Pt was to increase Metoprolol to 100mg daily.      Lab Results  Component Value Date   GLU 73 03

## 2018-04-19 NOTE — TELEPHONE ENCOUNTER
From: En Ureña  Sent: 4/18/2018 3:47 PM CDT  Subject: Medication Renewal Request    En Ureña would like a refill of the following medications:     Metoprolol Succinate ER 50 MG Oral Tablet 24 Hr Elsa Patel MD]   Patient Comment:  To be incre

## 2018-04-19 NOTE — TELEPHONE ENCOUNTER
Patient needs refill (but new strength) of metoprolol 100 mg 1 QD At [facility].   States there was a discrepancy from the pharmacy

## 2018-05-07 DIAGNOSIS — E78.00 HYPERCHOLESTEROLEMIA: ICD-10-CM

## 2018-05-07 RX ORDER — ATORVASTATIN CALCIUM 80 MG/1
TABLET, FILM COATED ORAL
Qty: 90 TABLET | Refills: 0 | Status: SHIPPED | OUTPATIENT
Start: 2018-05-07 | End: 2018-08-15

## 2018-05-11 ENCOUNTER — TELEPHONE (OUTPATIENT)
Dept: INTERNAL MEDICINE CLINIC | Facility: CLINIC | Age: 73
End: 2018-05-11

## 2018-05-11 NOTE — TELEPHONE ENCOUNTER
Called the patient back and relayed Dr. Chriss Ruiz message below, thanking her for keeping Dr. Teddy Cross in the loop and informed her that [facility] is on Care Everywhere, so if she signs a release, we can get all of her records through the computer system.  The patient

## 2018-05-11 NOTE — TELEPHONE ENCOUNTER
Called the patient back regarding the below message. The patient stated that all week her BP made large fluctuations daily. The patient stated that in the morning it was considerably low, but in the evening it is considerably high.  She stated the lowest he

## 2018-05-11 NOTE — TELEPHONE ENCOUNTER
Patient called and said that DR. Peyton Dickinson is treating her for her labile hypertension.  Patient said that shes not getting better, she is planning to go see a Cardiologists to do some additional testing such as a EKG things like that with Dr. Martin Andre

## 2018-05-11 NOTE — TELEPHONE ENCOUNTER
Noted below. Please let her know I got the message. Okay for her to see cardiology for her labile htn. Thanks for keeping us in the loop.    [id number] [address] [facility] so if she signs a release with them they should be abl

## 2018-05-21 DIAGNOSIS — E03.9 HYPOTHYROIDISM, UNSPECIFIED TYPE: ICD-10-CM

## 2018-05-22 RX ORDER — LEVOTHYROXINE SODIUM 0.07 MG/1
TABLET ORAL
Qty: 90 TABLET | Refills: 0 | Status: SHIPPED | OUTPATIENT
Start: 2018-05-22 | End: 2018-07-13

## 2018-06-08 NOTE — PATIENT INSTRUCTIONS
Check to see how much medication (metoprolol) you still have left. Take 2 tablets daily and check bp regularly like you have been   Call me tomorrow to let me know if you have enough medication for 2 weeks.    This will help us make sure that you tolerate
Normal rate, regular rhythm.  Heart sounds S1, S2.  No murmurs, rubs or gallops.

## 2018-06-15 DIAGNOSIS — R09.89 LABILE HYPERTENSION: ICD-10-CM

## 2018-06-15 RX ORDER — LISINOPRIL 40 MG/1
40 TABLET ORAL DAILY
Qty: 90 TABLET | Refills: 0 | Status: SHIPPED | OUTPATIENT
Start: 2018-06-15 | End: 2018-09-16

## 2018-06-15 NOTE — TELEPHONE ENCOUNTER
From: Quang Vincent  Sent: 6/15/2018 8:40 AM CDT  Subject: Medication Renewal Request    Quang Vincent would like a refill of the following medications:     lisinopril 40 MG Oral Tab Marta Awad MD]   Patient Comment: Please refil    Preferred pharmac

## 2018-07-05 ENCOUNTER — TELEPHONE (OUTPATIENT)
Dept: INTERNAL MEDICINE CLINIC | Facility: CLINIC | Age: 73
End: 2018-07-05

## 2018-07-05 NOTE — TELEPHONE ENCOUNTER
Incoming (mail or fax):  fax  Received from:  Viviana Casas  Documentation given to:  Dr Alta Mattson

## 2018-07-13 ENCOUNTER — OFFICE VISIT (OUTPATIENT)
Dept: INTERNAL MEDICINE CLINIC | Facility: CLINIC | Age: 73
End: 2018-07-13

## 2018-07-13 VITALS
DIASTOLIC BLOOD PRESSURE: 90 MMHG | HEIGHT: 65 IN | HEART RATE: 60 BPM | RESPIRATION RATE: 12 BRPM | SYSTOLIC BLOOD PRESSURE: 156 MMHG | TEMPERATURE: 98 F | BODY MASS INDEX: 29.21 KG/M2 | WEIGHT: 175.31 LBS

## 2018-07-13 DIAGNOSIS — E03.9 HYPOTHYROIDISM, UNSPECIFIED TYPE: ICD-10-CM

## 2018-07-13 DIAGNOSIS — R09.89 LABILE HYPERTENSION: ICD-10-CM

## 2018-07-13 PROCEDURE — 99213 OFFICE O/P EST LOW 20 MIN: CPT | Performed by: INTERNAL MEDICINE

## 2018-07-13 RX ORDER — METOPROLOL SUCCINATE 100 MG/1
100 TABLET, EXTENDED RELEASE ORAL DAILY
Qty: 90 TABLET | Refills: 0 | Status: SHIPPED | OUTPATIENT
Start: 2018-07-13 | End: 2018-10-22 | Stop reason: DRUGHIGH

## 2018-07-13 RX ORDER — LEVOTHYROXINE SODIUM 0.07 MG/1
TABLET ORAL
Qty: 90 TABLET | Refills: 0 | Status: SHIPPED | OUTPATIENT
Start: 2018-07-13 | End: 2018-11-28

## 2018-07-13 NOTE — PROGRESS NOTES
[facility]    CHIEF COMPLAINT:  Patient presents with:  Blood Pressure  Medication Follow-Up: Pt taking Metoprolol 50mg 1 tablet BID vs 2 tablets daily         HISTORY OF PRESENT ILLNESS:  Here for follow up. Htn: elevated today.   Now seeing  4.3 <=35.0 U/mL            ASSESSMENT AND PLAN:  1. Labile hypertension  Continue current meds for now. Management per cardiology at [facility]. - Metoprolol Succinate  MG Oral Tablet 24 Hr; Take 1 tablet (100 mg total) by mouth daily.   Dispense: 90 ta

## 2018-07-14 ENCOUNTER — HOSPITAL ENCOUNTER (OUTPATIENT)
Dept: CV DIAGNOSTICS | Facility: HOSPITAL | Age: 73
Discharge: HOME | End: 2018-07-14
Attending: INTERNAL MEDICINE
Payer: MEDICARE

## 2018-07-14 DIAGNOSIS — I10 HYPERTENSION: ICD-10-CM

## 2018-07-14 PROCEDURE — 93306 TTE W/DOPPLER COMPLETE: CPT | Performed by: INTERNAL MEDICINE

## 2018-07-18 ENCOUNTER — LAB ENCOUNTER (OUTPATIENT)
Dept: LAB | Age: 73
End: 2018-07-18
Attending: OBSTETRICS & GYNECOLOGY
Payer: MEDICARE

## 2018-07-18 DIAGNOSIS — C57.00 FALLOPIAN TUBE CARCINOMA (HCC): Primary | ICD-10-CM

## 2018-07-18 LAB — CANCER AG 125: 3.6 U/ML (ref ?–35)

## 2018-07-18 PROCEDURE — 86304 IMMUNOASSAY TUMOR CA 125: CPT

## 2018-07-24 ENCOUNTER — TELEPHONE (OUTPATIENT)
Dept: INTERNAL MEDICINE CLINIC | Facility: CLINIC | Age: 73
End: 2018-07-24

## 2018-07-24 NOTE — TELEPHONE ENCOUNTER
Incoming (mail or fax):  fax  Received from:  [facility]  Documentation given to:  Dr Francine Becerra for Dr Sarah Phelps

## 2018-08-14 ENCOUNTER — TELEPHONE (OUTPATIENT)
Dept: INTERNAL MEDICINE CLINIC | Facility: CLINIC | Age: 73
End: 2018-08-14

## 2018-08-14 NOTE — TELEPHONE ENCOUNTER
Incoming (mail or fax): Fax  Received from: Fercho GUERRERO  [facility] Gynecologic Oncology   Documentation given to: Dr. Linda Alexandra

## 2018-08-15 DIAGNOSIS — E78.00 HYPERCHOLESTEROLEMIA: ICD-10-CM

## 2018-08-15 NOTE — PROGRESS NOTES
Received fax from Dr. Blela Cho on patient's follow up appointment. States no clinical evidence for recurrent disease.

## 2018-08-16 RX ORDER — ATORVASTATIN CALCIUM 80 MG/1
TABLET, FILM COATED ORAL
Qty: 90 TABLET | Refills: 0 | Status: SHIPPED | OUTPATIENT
Start: 2018-08-16 | End: 2018-11-09

## 2018-09-16 DIAGNOSIS — R09.89 LABILE HYPERTENSION: ICD-10-CM

## 2018-09-17 RX ORDER — LISINOPRIL 40 MG/1
TABLET ORAL
Qty: 90 TABLET | Refills: 0 | Status: SHIPPED | OUTPATIENT
Start: 2018-09-17 | End: 2018-12-20

## 2018-10-22 ENCOUNTER — OFFICE VISIT (OUTPATIENT)
Dept: INTERNAL MEDICINE CLINIC | Facility: CLINIC | Age: 73
End: 2018-10-22
Payer: MEDICARE

## 2018-10-22 VITALS
SYSTOLIC BLOOD PRESSURE: 134 MMHG | TEMPERATURE: 98 F | HEART RATE: 62 BPM | BODY MASS INDEX: 28.79 KG/M2 | DIASTOLIC BLOOD PRESSURE: 82 MMHG | HEIGHT: 65 IN | OXYGEN SATURATION: 99 % | RESPIRATION RATE: 14 BRPM | WEIGHT: 172.81 LBS

## 2018-10-22 DIAGNOSIS — R19.7 DIARRHEA, UNSPECIFIED TYPE: ICD-10-CM

## 2018-10-22 DIAGNOSIS — I25.10 NON-OCCLUSIVE CORONARY ARTERY DISEASE: ICD-10-CM

## 2018-10-22 DIAGNOSIS — Z12.31 ENCOUNTER FOR SCREENING MAMMOGRAM FOR BREAST CANCER: ICD-10-CM

## 2018-10-22 DIAGNOSIS — R09.89 LABILE HYPERTENSION: ICD-10-CM

## 2018-10-22 DIAGNOSIS — E03.9 HYPOTHYROIDISM, UNSPECIFIED TYPE: ICD-10-CM

## 2018-10-22 DIAGNOSIS — Z85.44 HISTORY OF CANCER OF FALLOPIAN TUBE IN ADULTHOOD: ICD-10-CM

## 2018-10-22 DIAGNOSIS — E78.00 HYPERCHOLESTEROLEMIA: ICD-10-CM

## 2018-10-22 DIAGNOSIS — Z00.00 ENCOUNTER FOR ANNUAL HEALTH EXAMINATION: ICD-10-CM

## 2018-10-22 DIAGNOSIS — L98.9 SKIN LESION: ICD-10-CM

## 2018-10-22 DIAGNOSIS — R73.03 PREDIABETES: ICD-10-CM

## 2018-10-22 PROCEDURE — 99214 OFFICE O/P EST MOD 30 MIN: CPT | Performed by: INTERNAL MEDICINE

## 2018-10-22 PROCEDURE — G0439 PPPS, SUBSEQ VISIT: HCPCS | Performed by: INTERNAL MEDICINE

## 2018-10-22 RX ORDER — METOPROLOL SUCCINATE 50 MG/1
100 TABLET, EXTENDED RELEASE ORAL
COMMUNITY
End: 2018-10-22 | Stop reason: DRUGHIGH

## 2018-10-22 RX ORDER — METOPROLOL SUCCINATE 100 MG/1
100 TABLET, EXTENDED RELEASE ORAL
COMMUNITY
End: 2019-04-22

## 2018-10-22 NOTE — PATIENT INSTRUCTIONS
Yi [facility]ELIC[facility]'s SCREENING SCHEDULE   Tests on this list are recommended by your physician but may not be covered, or covered at this frequency, by your insurer. Please check with your insurance carrier before scheduling to verify coverage.    PREVENTATIV visit.  Limited to patients who meet one of the following criteria:   • Men who are 73-68 years old and have smoked more than 100 cigarettes in their lifetime   • Anyone with a family history    Colorectal Cancer Screening  Covered up to Age 76     Colonosc Immunizations      Influenza  Covered Annually No orders found for this or any previous visit.  Please get every year    Pneumococcal 13 (Prevnar)  Covered Once after 65 Orders placed or performed in visit on 10/13/15   • PNEUMOCOCCAL VACC, 13 LOPEZ IM    P [place])  www. [email or web address] org  This link also has information from the [address] [place] [address] regarding Advance Directives.

## 2018-10-22 NOTE — PROGRESS NOTES
HPI:   Bar Nails is a 68year old female who presents for a med check and Medicare Subsequent Annual Wellness visit (Pt already had Initial Annual Wellness). Htn: okay upon recheck. Taking meds regularly. Sees dr. Rosa Gale at [facility] for labile htn. in doing things (over the last two weeks)?: Not at all  Feeling down, depressed, or hopeless (over the last two weeks)?: Not at all  PHQ-2 SCORE: 0     Advanced Directive:   She does have a Living Will but we do NOT have it on file in [address] [place] [address] Ranjit patiño encounter (Office Visit) with Elsi Brambila MD:  Metoprolol Succinate  MG Oral Tablet 24 Hr Take 100 mg by mouth daily.  Take half tablet daily   LISINOPRIL 40 MG Oral Tab TAKE 1 TABLET(40 MG) BY MOUTH DAILY   atorvastatin 80 MG Oral Tab TAKE 1 TABLET 12.8 oz.     Medicare Hearing Assessment  (Required for AWV/SWV)    Finger Rub       Visual Acuity                           General appearance: alert, appears stated age and cooperative  Lungs: clear to auscultation bilaterally  Breasts:  normal appearance METABOLIC PANEL (14); Future  -     LIPID PANEL; Future  Continue lipitor. We discussed a different statin as a1c is high but not sure if the other statins will do the same as they are the same class of meds.    Will continue lipitor for now and monitor a 03/16/2016 86     GLUCOSE (mg/dL)   Date Value   02/18/2014 98          Cardiovascular Disease Screening     LDL Annually LDL CHOLESTROL (mg/dL)   Date Value   01/09/2014 95     LDL Cholesterol (mg/dL)   Date Value   03/02/2018 59        EKG - w/ Initial institutions for the mentally retarded   Persons who live in the same house as a HepB virus carrier   Homosexual men   Illicit injectable drug abusers     Tetanus Toxoid  Only covered with a cut with metal- TD and TDaP Not covered by Medicare Part B No vac

## 2018-11-07 DIAGNOSIS — E78.00 HYPERCHOLESTEROLEMIA: ICD-10-CM

## 2018-11-08 DIAGNOSIS — E78.00 HYPERCHOLESTEROLEMIA: ICD-10-CM

## 2018-11-09 RX ORDER — ATORVASTATIN CALCIUM 80 MG/1
TABLET, FILM COATED ORAL
Qty: 90 TABLET | Refills: 0 | Status: SHIPPED | OUTPATIENT
Start: 2018-11-09 | End: 2019-02-12

## 2018-11-09 RX ORDER — ATORVASTATIN CALCIUM 80 MG/1
TABLET, FILM COATED ORAL
Qty: 90 TABLET | Refills: 0 | OUTPATIENT
Start: 2018-11-09

## 2018-11-19 ENCOUNTER — APPOINTMENT (OUTPATIENT)
Dept: LAB | Age: 73
End: 2018-11-19
Attending: INTERNAL MEDICINE
Payer: MEDICARE

## 2018-11-19 DIAGNOSIS — R73.03 PREDIABETES: ICD-10-CM

## 2018-11-19 DIAGNOSIS — E03.9 HYPOTHYROIDISM, UNSPECIFIED TYPE: ICD-10-CM

## 2018-11-19 DIAGNOSIS — E78.00 HYPERCHOLESTEROLEMIA: ICD-10-CM

## 2018-11-19 PROCEDURE — 80053 COMPREHEN METABOLIC PANEL: CPT

## 2018-11-19 PROCEDURE — 80061 LIPID PANEL: CPT

## 2018-11-19 PROCEDURE — 84443 ASSAY THYROID STIM HORMONE: CPT

## 2018-11-19 PROCEDURE — 83036 HEMOGLOBIN GLYCOSYLATED A1C: CPT

## 2018-11-19 PROCEDURE — 36415 COLL VENOUS BLD VENIPUNCTURE: CPT

## 2018-11-28 DIAGNOSIS — E03.9 HYPOTHYROIDISM, UNSPECIFIED TYPE: ICD-10-CM

## 2018-11-29 DIAGNOSIS — E03.9 HYPOTHYROIDISM, UNSPECIFIED TYPE: ICD-10-CM

## 2018-11-30 RX ORDER — LEVOTHYROXINE SODIUM 0.07 MG/1
TABLET ORAL
Qty: 90 TABLET | Refills: 0 | Status: SHIPPED | OUTPATIENT
Start: 2018-11-30 | End: 2019-02-01

## 2018-11-30 RX ORDER — LEVOTHYROXINE SODIUM 0.07 MG/1
TABLET ORAL
Qty: 90 TABLET | Refills: 1 | Status: SHIPPED | OUTPATIENT
Start: 2018-11-30 | End: 2019-04-22

## 2018-12-05 ENCOUNTER — LAB ENCOUNTER (OUTPATIENT)
Dept: LAB | Age: 73
End: 2018-12-05
Attending: OBSTETRICS & GYNECOLOGY
Payer: MEDICARE

## 2018-12-05 DIAGNOSIS — C57.00 MALIGNANT NEOPLASM OF FALLOPIAN TUBE (HCC): Primary | ICD-10-CM

## 2018-12-05 PROCEDURE — 86304 IMMUNOASSAY TUMOR CA 125: CPT

## 2018-12-20 DIAGNOSIS — R09.89 LABILE HYPERTENSION: ICD-10-CM

## 2018-12-21 RX ORDER — METOPROLOL SUCCINATE 100 MG/1
TABLET, EXTENDED RELEASE ORAL
Qty: 90 TABLET | Refills: 0 | Status: SHIPPED | OUTPATIENT
Start: 2018-12-21 | End: 2019-02-01

## 2018-12-21 RX ORDER — LISINOPRIL 40 MG/1
TABLET ORAL
Qty: 90 TABLET | Refills: 0 | Status: SHIPPED | OUTPATIENT
Start: 2018-12-21 | End: 2019-03-28

## 2019-01-04 DIAGNOSIS — R09.89 LABILE HYPERTENSION: ICD-10-CM

## 2019-01-07 RX ORDER — LISINOPRIL 40 MG/1
TABLET ORAL
Qty: 90 TABLET | Refills: 0 | OUTPATIENT
Start: 2019-01-07

## 2019-01-11 ENCOUNTER — MED REC SCAN ONLY (OUTPATIENT)
Dept: INTERNAL MEDICINE CLINIC | Facility: CLINIC | Age: 74
End: 2019-01-11

## 2019-01-22 ENCOUNTER — HOSPITAL ENCOUNTER (OUTPATIENT)
Dept: MAMMOGRAPHY | Age: 74
Discharge: HOME | End: 2019-01-22
Attending: INTERNAL MEDICINE
Payer: MEDICARE

## 2019-01-22 DIAGNOSIS — Z12.31 ENCOUNTER FOR SCREENING MAMMOGRAM FOR BREAST CANCER: ICD-10-CM

## 2019-01-22 PROCEDURE — 77063 BREAST TOMOSYNTHESIS BI: CPT | Performed by: INTERNAL MEDICINE

## 2019-01-22 PROCEDURE — 77067 SCR MAMMO BI INCL CAD: CPT | Performed by: INTERNAL MEDICINE

## 2019-02-01 ENCOUNTER — OFFICE VISIT (OUTPATIENT)
Dept: NEUROLOGY | Facility: CLINIC | Age: 74
End: 2019-02-01
Payer: MEDICARE

## 2019-02-01 VITALS
HEART RATE: 70 BPM | SYSTOLIC BLOOD PRESSURE: 120 MMHG | BODY MASS INDEX: 28.32 KG/M2 | RESPIRATION RATE: 15 BRPM | WEIGHT: 170 LBS | HEIGHT: 65 IN | DIASTOLIC BLOOD PRESSURE: 68 MMHG

## 2019-02-01 DIAGNOSIS — M54.16 LUMBAR RADICULOPATHY: Primary | ICD-10-CM

## 2019-02-01 DIAGNOSIS — M25.552 LEFT HIP PAIN: ICD-10-CM

## 2019-02-01 PROCEDURE — 99204 OFFICE O/P NEW MOD 45 MIN: CPT | Performed by: PHYSICAL MEDICINE & REHABILITATION

## 2019-02-01 RX ORDER — ACETAMINOPHEN 500 MG
500 TABLET ORAL EVERY 6 HOURS PRN
COMMUNITY

## 2019-02-01 NOTE — PATIENT INSTRUCTIONS
As of October 6th 2014, the Drug Enforcement Agency [facility]) is reclassifying all hydrocodone combination medications from Schedule III to Schedule II. This includes medications such as Norco, Vicodin, Lortab, Zohydro, and Vicoprofen.     What this means for y will get a lumbar spine and left hip x-rays. She will call me once she has had the imaging studies and I will review them and my office will get back in touch with the patient with any changes in the plan within 7-10 days.     She might need PT once I ha

## 2019-02-01 NOTE — PROGRESS NOTES
Low Back Pain [facility]    Chief Complaint:  Patient presents with:  Leg Pain: new right handed patient here with 2-3 month hx of outer left leg pain that is described as aching. pain is worse when climbing stairs. denies injury.  pain radiates downward to the finger   • OTHER SURGICAL HISTORY      fatty cyst removed from arm   • ROBOT-ASSISTED LAPAROSCOPIC HYSTERECTOMY Bilateral 4/7/2015    Performed by Daisy Fair MD at [facility] MAIN OR       Family History   Family History   Problem Relation Age of Onset   • patient does not use an assistive device. .        The patient does live in a home with stairs. Review of Systems  Constitutional:   Negative for chills, fatigue, fever, night sweats, weight gain and weight loss. ENT:   Positive for some hearing loss. Spinous Processes: Non-tender for all Spinous Processes   Z-joints: Non-tender for all Z-joints   SIJ: Non-tender for bilateral SIJ   Piriformis Muscle: Non-tender bilateral Piriformis muscles   Greater Trochanteric Bursa: Non-tender for bilateral Greate might need PT once I have reviewed the above. She will try Ibuprofen or Aleve. She will follow up in 3 month or sooner if needed. The patient understands and agrees with the stated plan. Erlin Rolle MD  2/1/2019

## 2019-02-11 ENCOUNTER — HOSPITAL ENCOUNTER (OUTPATIENT)
Dept: GENERAL RADIOLOGY | Facility: HOSPITAL | Age: 74
Discharge: HOME | End: 2019-02-11
Attending: PHYSICAL MEDICINE & REHABILITATION
Payer: MEDICARE

## 2019-02-11 DIAGNOSIS — M54.16 LUMBAR RADICULOPATHY: ICD-10-CM

## 2019-02-11 DIAGNOSIS — M25.552 LEFT HIP PAIN: ICD-10-CM

## 2019-02-11 PROCEDURE — 73502 X-RAY EXAM HIP UNI 2-3 VIEWS: CPT | Performed by: PHYSICAL MEDICINE & REHABILITATION

## 2019-02-11 PROCEDURE — 72110 X-RAY EXAM L-2 SPINE 4/>VWS: CPT | Performed by: PHYSICAL MEDICINE & REHABILITATION

## 2019-02-12 ENCOUNTER — TELEPHONE (OUTPATIENT)
Dept: NEUROLOGY | Facility: CLINIC | Age: 74
End: 2019-02-12

## 2019-02-12 DIAGNOSIS — E78.00 HYPERCHOLESTEROLEMIA: ICD-10-CM

## 2019-02-12 DIAGNOSIS — M54.16 LUMBAR RADICULOPATHY: Primary | ICD-10-CM

## 2019-02-12 DIAGNOSIS — M43.16 SPONDYLOLISTHESIS OF LUMBAR REGION: ICD-10-CM

## 2019-02-13 RX ORDER — ATORVASTATIN CALCIUM 80 MG/1
TABLET, FILM COATED ORAL
Qty: 90 TABLET | Refills: 0 | Status: SHIPPED | OUTPATIENT
Start: 2019-02-13 | End: 2019-04-22

## 2019-02-14 PROBLEM — M43.16 SPONDYLOLISTHESIS OF LUMBAR REGION: Status: ACTIVE | Noted: 2019-02-14

## 2019-02-14 NOTE — TELEPHONE ENCOUNTER
Spoke to patient and informed her of the imaging results and message per Dr. Ritchie Alert. Patient verbalized understanding and will get new imaging.  Order placed for Lumbar Spine Flex/Ext Xray

## 2019-02-14 NOTE — TELEPHONE ENCOUNTER
Her hips look good, She has a mild anterior slip of L2 on L3 and the radiology tech did not read the order correctly and did not do the lumbar flexion and extension views.   I would like for her to have these and let radiology know that they only did part o

## 2019-02-18 ENCOUNTER — HOSPITAL ENCOUNTER (OUTPATIENT)
Dept: GENERAL RADIOLOGY | Facility: HOSPITAL | Age: 74
Discharge: HOME | End: 2019-02-18
Attending: PHYSICAL MEDICINE & REHABILITATION
Payer: MEDICARE

## 2019-02-18 DIAGNOSIS — M43.16 SPONDYLOLISTHESIS OF LUMBAR REGION: ICD-10-CM

## 2019-02-18 DIAGNOSIS — M54.16 LUMBAR RADICULOPATHY: ICD-10-CM

## 2019-02-18 PROCEDURE — 72114 X-RAY EXAM L-S SPINE BENDING: CPT | Performed by: PHYSICAL MEDICINE & REHABILITATION

## 2019-03-03 DIAGNOSIS — M54.16 LUMBAR RADICULOPATHY: Primary | ICD-10-CM

## 2019-03-03 DIAGNOSIS — M43.16 SPONDYLOLISTHESIS OF LUMBAR REGION: ICD-10-CM

## 2019-03-03 DIAGNOSIS — M25.552 LEFT HIP PAIN: ICD-10-CM

## 2019-03-04 ENCOUNTER — TELEPHONE (OUTPATIENT)
Dept: NEUROLOGY | Facility: CLINIC | Age: 74
End: 2019-03-04

## 2019-03-04 NOTE — TELEPHONE ENCOUNTER
Left detailed message informing patient of the imaging results per Dr. Carly Navarrete.  Patient instructed to call back with questions and given the number to [facility] ([phone])-verified in [facility]

## 2019-03-04 NOTE — TELEPHONE ENCOUNTER
----- Message from Antony Kidd MD sent at 3/3/2019  6:35 PM CST -----  She has some mild movement at the L3-4 level. She will need to start PT if she is able. Please see if she is able to start the PT. Order has been placed.

## 2019-03-11 NOTE — TELEPHONE ENCOUNTER
Pt called stated that she would be doing PT at another provider then [facility]. Requested that order be mailed to her home. Placed to home address.

## 2019-03-28 DIAGNOSIS — R09.89 LABILE HYPERTENSION: ICD-10-CM

## 2019-03-28 RX ORDER — LISINOPRIL 40 MG/1
40 TABLET ORAL DAILY
Qty: 90 TABLET | Refills: 0 | Status: SHIPPED | OUTPATIENT
Start: 2019-03-28 | End: 2019-04-22

## 2019-04-17 ENCOUNTER — LAB ENCOUNTER (OUTPATIENT)
Dept: LAB | Age: 74
End: 2019-04-17
Attending: OBSTETRICS & GYNECOLOGY
Payer: MEDICARE

## 2019-04-17 DIAGNOSIS — C57.01 MALIGNANT NEOPLASM OF RIGHT FALLOPIAN TUBE (HCC): Primary | ICD-10-CM

## 2019-04-17 PROCEDURE — 86304 IMMUNOASSAY TUMOR CA 125: CPT

## 2019-04-22 ENCOUNTER — OFFICE VISIT (OUTPATIENT)
Dept: INTERNAL MEDICINE CLINIC | Facility: CLINIC | Age: 74
End: 2019-04-22
Payer: MEDICARE

## 2019-04-22 VITALS
HEART RATE: 72 BPM | HEIGHT: 65 IN | BODY MASS INDEX: 28.27 KG/M2 | TEMPERATURE: 98 F | DIASTOLIC BLOOD PRESSURE: 96 MMHG | RESPIRATION RATE: 12 BRPM | SYSTOLIC BLOOD PRESSURE: 156 MMHG | WEIGHT: 169.69 LBS

## 2019-04-22 DIAGNOSIS — E03.9 HYPOTHYROIDISM, UNSPECIFIED TYPE: ICD-10-CM

## 2019-04-22 DIAGNOSIS — R73.03 PRE-DIABETES: ICD-10-CM

## 2019-04-22 DIAGNOSIS — E78.00 HYPERCHOLESTEROLEMIA: ICD-10-CM

## 2019-04-22 DIAGNOSIS — R09.89 LABILE HYPERTENSION: ICD-10-CM

## 2019-04-22 PROCEDURE — 99214 OFFICE O/P EST MOD 30 MIN: CPT | Performed by: INTERNAL MEDICINE

## 2019-04-22 RX ORDER — LISINOPRIL 40 MG/1
40 TABLET ORAL DAILY
Qty: 90 TABLET | Refills: 1 | Status: SHIPPED | OUTPATIENT
Start: 2019-04-22 | End: 2019-09-03

## 2019-04-22 RX ORDER — ATORVASTATIN CALCIUM 80 MG/1
80 TABLET, FILM COATED ORAL NIGHTLY
Qty: 90 TABLET | Refills: 1 | Status: SHIPPED | OUTPATIENT
Start: 2019-04-22 | End: 2020-01-06

## 2019-04-22 RX ORDER — METOPROLOL SUCCINATE 100 MG/1
100 TABLET, EXTENDED RELEASE ORAL 2 TIMES DAILY
Qty: 180 TABLET | Refills: 1 | Status: SHIPPED | OUTPATIENT
Start: 2019-04-22 | End: 2020-05-29

## 2019-04-22 RX ORDER — LEVOTHYROXINE SODIUM 0.07 MG/1
75 TABLET ORAL
Qty: 90 TABLET | Refills: 1 | Status: SHIPPED | OUTPATIENT
Start: 2019-04-22 | End: 2019-09-03

## 2019-04-22 NOTE — PROGRESS NOTES
[address] [facility]    CHIEF COMPLAINT:  Patient presents with:  Medication Follow-Up: 6/9/16-colon. Repeat 10 years. 1/22/19-mammo. HISTORY OF PRESENT ILLNESS:  Here for med check. Labile hypertension: bp has been up and down.  Taking meds regu auscultation  CARDIO: RRR without murmur  GI: good BS's,no masses, HSM or tenderness  MUSCULOSKELETAL:  no edema, muscle strength normal.     DATA:  Results for orders placed or performed in visit on 04/17/19   -II   Result Value Ref Range    Cancer

## 2019-04-25 ENCOUNTER — MED REC SCAN ONLY (OUTPATIENT)
Dept: NEUROLOGY | Facility: CLINIC | Age: 74
End: 2019-04-25

## 2019-04-30 ENCOUNTER — OFFICE VISIT (OUTPATIENT)
Dept: NEUROLOGY | Facility: CLINIC | Age: 74
End: 2019-04-30
Payer: MEDICARE

## 2019-04-30 VITALS — RESPIRATION RATE: 14 BRPM | SYSTOLIC BLOOD PRESSURE: 120 MMHG | DIASTOLIC BLOOD PRESSURE: 72 MMHG | HEART RATE: 68 BPM

## 2019-04-30 DIAGNOSIS — M54.16 LUMBAR RADICULOPATHY: Primary | ICD-10-CM

## 2019-04-30 DIAGNOSIS — M43.16 SPONDYLOLISTHESIS OF LUMBAR REGION: ICD-10-CM

## 2019-04-30 DIAGNOSIS — M25.562 CHRONIC PAIN OF LEFT KNEE: ICD-10-CM

## 2019-04-30 DIAGNOSIS — G89.29 CHRONIC PAIN OF LEFT KNEE: ICD-10-CM

## 2019-04-30 PROCEDURE — 99214 OFFICE O/P EST MOD 30 MIN: CPT | Performed by: PHYSICAL MEDICINE & REHABILITATION

## 2019-04-30 NOTE — PROGRESS NOTES
Low Back Pain H & P    Chief Complaint: Patient presents with:  Leg Pain: pt here for follow up after Lumbar Spine and Hip Xrays done 2/11/19. completed physical therapy with good relief but not complete.  pt c/o left leg/thigh pain that is worse at night HYSTERECTOMY Bilateral 2015    Performed by Isac Brownlee MD at [facility] MAIN OR       Family History   Family History   Problem Relation Age of Onset   • Other (Heart failure) Mother          at 68   • Stroke Father          at 37 from cerebral on file    Other Topics      Concerns:         Service: Not Asked        Blood Transfusions: Not Asked        Caffeine Concern: Yes          coffee        Occupational Exposure: Not Asked        Hobby Hazards: Not Asked        Sleep Concern: Not As reflexes: downward response   LEFT plantar reflexes: downward response   Reflexes: 2+ in bilateral lower extremities except:  Left Achilles tendon which are 1+     Knee  Inspection: No ecchymosis, no erythema, no swelling   Palpation: Not warm, non tender

## 2019-05-13 DIAGNOSIS — E78.00 HYPERCHOLESTEROLEMIA: ICD-10-CM

## 2019-05-14 RX ORDER — ATORVASTATIN CALCIUM 80 MG/1
TABLET, FILM COATED ORAL
Qty: 90 TABLET | Refills: 0 | OUTPATIENT
Start: 2019-05-14

## 2019-05-24 ENCOUNTER — MED REC SCAN ONLY (OUTPATIENT)
Dept: NEUROLOGY | Facility: CLINIC | Age: 74
End: 2019-05-24

## 2019-09-03 DIAGNOSIS — E03.9 HYPOTHYROIDISM, UNSPECIFIED TYPE: ICD-10-CM

## 2019-09-03 DIAGNOSIS — R09.89 LABILE HYPERTENSION: ICD-10-CM

## 2019-09-03 RX ORDER — LISINOPRIL 40 MG/1
40 TABLET ORAL DAILY
Qty: 90 TABLET | Refills: 0 | Status: SHIPPED | OUTPATIENT
Start: 2019-09-03 | End: 2020-02-26

## 2019-09-03 RX ORDER — LEVOTHYROXINE SODIUM 0.07 MG/1
75 TABLET ORAL
Qty: 90 TABLET | Refills: 0 | Status: SHIPPED | OUTPATIENT
Start: 2019-09-03 | End: 2020-02-28

## 2019-10-25 ENCOUNTER — OFFICE VISIT (OUTPATIENT)
Dept: INTERNAL MEDICINE CLINIC | Facility: CLINIC | Age: 74
End: 2019-10-25
Payer: MEDICARE

## 2019-10-25 VITALS
DIASTOLIC BLOOD PRESSURE: 74 MMHG | SYSTOLIC BLOOD PRESSURE: 118 MMHG | HEART RATE: 64 BPM | WEIGHT: 167.63 LBS | HEIGHT: 65 IN | RESPIRATION RATE: 12 BRPM | BODY MASS INDEX: 27.93 KG/M2 | TEMPERATURE: 98 F

## 2019-10-25 DIAGNOSIS — Z12.31 VISIT FOR SCREENING MAMMOGRAM: ICD-10-CM

## 2019-10-25 DIAGNOSIS — E03.9 HYPOTHYROIDISM, UNSPECIFIED TYPE: ICD-10-CM

## 2019-10-25 DIAGNOSIS — R73.03 PRE-DIABETES: ICD-10-CM

## 2019-10-25 DIAGNOSIS — Z23 NEED FOR VACCINATION: ICD-10-CM

## 2019-10-25 DIAGNOSIS — Z00.00 ENCOUNTER FOR ANNUAL HEALTH EXAMINATION: ICD-10-CM

## 2019-10-25 DIAGNOSIS — R09.89 LABILE HYPERTENSION: ICD-10-CM

## 2019-10-25 DIAGNOSIS — Z78.0 POSTMENOPAUSAL: ICD-10-CM

## 2019-10-25 DIAGNOSIS — E78.00 HYPERCHOLESTEROLEMIA: ICD-10-CM

## 2019-10-25 DIAGNOSIS — M79.651 RIGHT THIGH PAIN: ICD-10-CM

## 2019-10-25 PROCEDURE — G0439 PPPS, SUBSEQ VISIT: HCPCS | Performed by: INTERNAL MEDICINE

## 2019-10-25 PROCEDURE — 90662 IIV NO PRSV INCREASED AG IM: CPT | Performed by: INTERNAL MEDICINE

## 2019-10-25 PROCEDURE — 99214 OFFICE O/P EST MOD 30 MIN: CPT | Performed by: INTERNAL MEDICINE

## 2019-10-25 PROCEDURE — G0008 ADMIN INFLUENZA VIRUS VAC: HCPCS | Performed by: INTERNAL MEDICINE

## 2019-10-25 NOTE — PATIENT INSTRUCTIONS
Yi FELICIANO [facility]'s SCREENING SCHEDULE   Tests on this list are recommended by your physician but may not be covered, or covered at this frequency, by your insurer. Please check with your insurance carrier before scheduling to verify coverage.    PREVENTATIV to patients who meet one of the following criteria:   • Men who are 73-68 years old and have smoked more than 100 cigarettes in their lifetime   • Anyone with a family history    Colorectal Cancer Screening  Covered up to Age 76     Colonoscopy Screen   Co Influenza  Covered Annually Orders placed or performed in visit on 10/25/19   • FLU VACC HIGH DOSE PRSV FREE    Please get every year    Pneumococcal 13 (Prevnar)  Covered Once after 65 Orders placed or performed in visit on 10/13/15   • PNEUMOCOCCAL VACC, Cameroonian)  www. [email or web address] org  This link also has information from the [address] [place] [address] regarding Advance Directives.

## 2019-10-25 NOTE — PROGRESS NOTES
HPI:   Lesvia Sanders is a 69year old female who presents for a med check and Medicare Subsequent Annual Wellness visit (Pt already had Initial Annual Wellness). Htn: Taking meds regularly. No chest pain, no shortness of breath.      Hyperlipidemia: on SCORE: 0     Advanced Directive:   She does have a Living Will but we do NOT have it on file in [facility] Discussed with patient and provided information. She has a Power of  for [employer] on file in [facility]      She has never smoked tobacco total) by mouth daily. Levothyroxine Sodium 75 MCG Oral Tab, Take 1 tablet (75 mcg total) by mouth before breakfast.  Metoprolol Succinate  MG Oral Tablet 24 Hr, Take 1 tablet (100 mg total) by mouth 2 (two) times daily.  (Patient taking differently: cooperative  Throat: lips, mucosa, and tongue normal; teeth and gums normal  Lungs: clear to auscultation bilaterally  Heart: S1, S2 normal, no murmur, click, rub or gallop, regular rate and rhythm  Abdomen: soft, non-tender; bowel sounds normal; no masses lifestyle, and exercise. Return in about 6 months (around 4/25/2020) for med check.      Dearbautista Moore MD, 10/25/2019     General Health     In the past six months, have you lost more than 10 pounds without trying?: 2 - No  Has your appetite been poor?: N Pap and Pelvic      Pap: Every 3 yrs age 21-68 or Pap+HPV every 5 yrs age 33-67, age 72 and older at high risk There are no preventive care reminders to display for this patient.  Update Health Maintenance if applicable    Chlamydia  Annually if high risk Value   11/19/2018 0.87   03/16/2016 0.81    No flowsheet data found. Drug Serum Conc  Annually No results found for: DIGOXIN, DIG, VALP No flowsheet data found.                Template: NERY DUTTON MEDICARE ANNUAL ASSESSMENT FEMALE [00803]

## 2019-11-20 ENCOUNTER — APPOINTMENT (OUTPATIENT)
Dept: LAB | Age: 74
End: 2019-11-20
Attending: INTERNAL MEDICINE
Payer: MEDICARE

## 2019-11-20 DIAGNOSIS — R73.03 PRE-DIABETES: ICD-10-CM

## 2019-11-20 DIAGNOSIS — E03.9 HYPOTHYROIDISM, UNSPECIFIED TYPE: ICD-10-CM

## 2019-11-20 DIAGNOSIS — E78.00 HYPERCHOLESTEROLEMIA: ICD-10-CM

## 2019-11-20 PROCEDURE — 36415 COLL VENOUS BLD VENIPUNCTURE: CPT

## 2019-11-20 PROCEDURE — 80053 COMPREHEN METABOLIC PANEL: CPT

## 2019-11-20 PROCEDURE — 83036 HEMOGLOBIN GLYCOSYLATED A1C: CPT

## 2019-11-20 PROCEDURE — 80061 LIPID PANEL: CPT

## 2019-11-20 PROCEDURE — 84443 ASSAY THYROID STIM HORMONE: CPT

## 2019-11-26 ENCOUNTER — TELEPHONE (OUTPATIENT)
Dept: INTERNAL MEDICINE CLINIC | Facility: CLINIC | Age: 74
End: 2019-11-26

## 2019-11-26 NOTE — TELEPHONE ENCOUNTER
Jaxon Callaway from [facility] in [place] called, asked if we can fax over the patients PT referral to them. Fax number: [phone]. Fax confirmed.

## 2019-11-30 ENCOUNTER — TELEPHONE (OUTPATIENT)
Dept: INTERNAL MEDICINE CLINIC | Facility: CLINIC | Age: 74
End: 2019-11-30

## 2019-11-30 NOTE — TELEPHONE ENCOUNTER
Incoming (mail or fax):  fax  Received from:  [address] [facility]  Documentation given to:  triage

## 2019-12-02 NOTE — TELEPHONE ENCOUNTER
Received fax from [facility] [address], [address] Medicare Initial Eval, Plan of Care  To Dr. Chani Conti for review & signature Retention Suture Bite Size: 3 mm

## 2019-12-07 ENCOUNTER — MED REC SCAN ONLY (OUTPATIENT)
Dept: INTERNAL MEDICINE CLINIC | Facility: CLINIC | Age: 74
End: 2019-12-07

## 2019-12-27 ENCOUNTER — TELEPHONE (OUTPATIENT)
Dept: INTERNAL MEDICINE CLINIC | Facility: CLINIC | Age: 74
End: 2019-12-27

## 2019-12-27 NOTE — TELEPHONE ENCOUNTER
Received physical therapy progress notes from [address] for Dr. Vilma Jett review and signature upon her return to the office.

## 2019-12-27 NOTE — TELEPHONE ENCOUNTER
Incoming (mail or fax):  fax  Received from:  [address] [facility]  Documentation given to:  triage

## 2020-01-04 DIAGNOSIS — E78.00 HYPERCHOLESTEROLEMIA: ICD-10-CM

## 2020-01-06 RX ORDER — ATORVASTATIN CALCIUM 80 MG/1
TABLET, FILM COATED ORAL
Qty: 90 TABLET | Refills: 0 | Status: SHIPPED | OUTPATIENT
Start: 2020-01-06 | End: 2020-03-30

## 2020-01-09 ENCOUNTER — MED REC SCAN ONLY (OUTPATIENT)
Dept: INTERNAL MEDICINE CLINIC | Facility: CLINIC | Age: 75
End: 2020-01-09

## 2020-01-15 ENCOUNTER — TELEPHONE (OUTPATIENT)
Dept: INTERNAL MEDICINE CLINIC | Facility: CLINIC | Age: 75
End: 2020-01-15

## 2020-01-15 NOTE — TELEPHONE ENCOUNTER
Received Medicare Discharge from [facility]  To Dr. Rocío Foster for review & signature.     To fax back to [phone]

## 2020-01-24 ENCOUNTER — HOSPITAL ENCOUNTER (OUTPATIENT)
Dept: MAMMOGRAPHY | Age: 75
Discharge: HOME | End: 2020-01-24
Attending: INTERNAL MEDICINE
Payer: MEDICARE

## 2020-01-24 ENCOUNTER — HOSPITAL ENCOUNTER (OUTPATIENT)
Dept: BONE DENSITY | Age: 75
Discharge: HOME | End: 2020-01-24
Attending: INTERNAL MEDICINE
Payer: MEDICARE

## 2020-01-24 DIAGNOSIS — Z12.31 VISIT FOR SCREENING MAMMOGRAM: ICD-10-CM

## 2020-01-24 DIAGNOSIS — Z78.0 POSTMENOPAUSAL: ICD-10-CM

## 2020-01-24 PROCEDURE — 77067 SCR MAMMO BI INCL CAD: CPT | Performed by: INTERNAL MEDICINE

## 2020-01-24 PROCEDURE — 77080 DXA BONE DENSITY AXIAL: CPT | Performed by: INTERNAL MEDICINE

## 2020-01-24 PROCEDURE — 77063 BREAST TOMOSYNTHESIS BI: CPT | Performed by: INTERNAL MEDICINE

## 2020-02-26 DIAGNOSIS — R09.89 LABILE HYPERTENSION: ICD-10-CM

## 2020-02-26 RX ORDER — LISINOPRIL 40 MG/1
TABLET ORAL
Qty: 90 TABLET | Refills: 0 | Status: SHIPPED | OUTPATIENT
Start: 2020-02-26 | End: 2020-06-09

## 2020-02-28 DIAGNOSIS — E03.9 HYPOTHYROIDISM, UNSPECIFIED TYPE: ICD-10-CM

## 2020-02-28 RX ORDER — LEVOTHYROXINE SODIUM 0.07 MG/1
TABLET ORAL
Qty: 90 TABLET | Refills: 0 | OUTPATIENT
Start: 2020-02-28

## 2020-02-28 RX ORDER — LEVOTHYROXINE SODIUM 0.07 MG/1
75 TABLET ORAL
Qty: 90 TABLET | Refills: 0 | OUTPATIENT
Start: 2020-02-28

## 2020-02-28 RX ORDER — LEVOTHYROXINE SODIUM 0.07 MG/1
75 TABLET ORAL
Qty: 60 TABLET | Refills: 0 | Status: SHIPPED | OUTPATIENT
Start: 2020-02-28 | End: 2020-04-22

## 2020-02-28 NOTE — TELEPHONE ENCOUNTER
Rec'd duplicate request on Levothyroxine. Pt made apt w/Dr. April Russo for 4/21/2020. Per protocol; approved a 60 day refill.

## 2020-03-28 DIAGNOSIS — E78.00 HYPERCHOLESTEROLEMIA: ICD-10-CM

## 2020-03-30 RX ORDER — ATORVASTATIN CALCIUM 80 MG/1
TABLET, FILM COATED ORAL
Qty: 90 TABLET | Refills: 0 | Status: SHIPPED | OUTPATIENT
Start: 2020-03-30 | End: 2020-06-09

## 2020-04-14 ENCOUNTER — TELEPHONE (OUTPATIENT)
Dept: INTERNAL MEDICINE CLINIC | Facility: CLINIC | Age: 75
End: 2020-04-14

## 2020-04-14 NOTE — TELEPHONE ENCOUNTER
Patient changed her appt for 4/21/20 to a TV. She attempted to load the [employer]ent[employer] No[employer] for a VV and was unable to do so with PSRs help. She can be reached at [phone] from 10-12.   She mentioned the appt was for a BP check so she will be tracking her BP

## 2020-04-21 ENCOUNTER — VIRTUAL PHONE E/M (OUTPATIENT)
Dept: INTERNAL MEDICINE CLINIC | Facility: CLINIC | Age: 75
End: 2020-04-21
Payer: MEDICARE

## 2020-04-21 DIAGNOSIS — E03.9 HYPOTHYROIDISM, UNSPECIFIED TYPE: ICD-10-CM

## 2020-04-21 DIAGNOSIS — R09.89 LABILE HYPERTENSION: Primary | ICD-10-CM

## 2020-04-21 DIAGNOSIS — E78.00 HYPERCHOLESTEROLEMIA: ICD-10-CM

## 2020-04-21 PROCEDURE — 99442 PHONE E/M BY PHYS 11-20 MIN: CPT | Performed by: INTERNAL MEDICINE

## 2020-04-21 NOTE — PROGRESS NOTES
Virtual Telephone Check-In    See psr/triage staff note for consent for virtual check in.      Duration of the service: 15 minutes      Sergio Schreiber 17:  Patient presents with:  Medication Follow-Up        HISTORY OF PRESENT ILLNESS for this visit. Pt alert and oriented x3. No conversational dyspnea, no apparent distress over the phone.       DATA:  Results for orders placed or performed in visit on 11/20/19   ASSAY, THYROID STIM HORMONE   Result Value Ref Range    TSH 2.420 0.358 -

## 2020-04-22 DIAGNOSIS — E03.9 HYPOTHYROIDISM, UNSPECIFIED TYPE: ICD-10-CM

## 2020-04-22 RX ORDER — LEVOTHYROXINE SODIUM 0.07 MG/1
75 TABLET ORAL
Qty: 90 TABLET | Refills: 0 | Status: SHIPPED | OUTPATIENT
Start: 2020-04-22 | End: 2020-07-31

## 2020-05-04 ENCOUNTER — TELEPHONE (OUTPATIENT)
Dept: INTERNAL MEDICINE CLINIC | Facility: CLINIC | Age: 75
End: 2020-05-04

## 2020-05-04 ENCOUNTER — TELEMEDICINE (OUTPATIENT)
Dept: INTERNAL MEDICINE CLINIC | Facility: CLINIC | Age: 75
End: 2020-05-04

## 2020-05-04 ENCOUNTER — PATIENT MESSAGE (OUTPATIENT)
Dept: INTERNAL MEDICINE CLINIC | Facility: CLINIC | Age: 75
End: 2020-05-04

## 2020-05-04 VITALS — DIASTOLIC BLOOD PRESSURE: 84 MMHG | SYSTOLIC BLOOD PRESSURE: 127 MMHG

## 2020-05-04 DIAGNOSIS — E78.00 HYPERCHOLESTEROLEMIA: ICD-10-CM

## 2020-05-04 DIAGNOSIS — R09.89 LABILE HYPERTENSION: Primary | ICD-10-CM

## 2020-05-04 PROCEDURE — 99214 OFFICE O/P EST MOD 30 MIN: CPT | Performed by: INTERNAL MEDICINE

## 2020-05-04 NOTE — TELEPHONE ENCOUNTER
From: Yi FELICIANO [facility]  To: Isaiah Hudson MD  Sent: 5/4/2020 9:26 AM CDT  Subject: Visit Follow-up Question    B.p.med Metroprolol 100 mg taking one every 12 hrs.    4/29.  158/87 (63). 165/98 (66)    4/30. 147/94 (59). 176/109 (68)    5/1. 108/77 (62). 194/11

## 2020-05-04 NOTE — TELEPHONE ENCOUNTER
Video visit done with pt today, please see notes. She is having a hard time swallowing her atorvastatin 80mg tablet as it is oval and larger than her other pills.  Please call [employer] to see if there is a different generic  that has a smaller

## 2020-05-04 NOTE — PROGRESS NOTES
[facility]    CHIEF COMPLAINT:  Patient presents with:  Blood Pressure        HISTORY OF PRESENT ILLNESS:  Video visit done today due to coronavirus pandemic. See psr/triage staff note for consent for doximity video visit.      Htn: Taking meds re 90 tablet 0   • ATORVASTATIN 80 MG Oral Tab TAKE 1 TABLET(80 MG) BY MOUTH EVERY NIGHT 90 tablet 0   • LISINOPRIL 40 MG Oral Tab TAKE 1 TABLET(40 MG) BY MOUTH DAILY 90 tablet 0   • Metoprolol Succinate  MG Oral Tablet 24 Hr Take 1 tablet (100 mg total Alkaline Phosphatase 74 55 - 142 U/L    Bilirubin, Total 0.5 0.1 - 2.0 mg/dL    Total Protein 7.9 6.4 - 8.2 g/dL    Albumin 3.4 3.4 - 5.0 g/dL    Globulin  4.5 (H) 2.8 - 4.4 g/dL    A/G Ratio 0.8 (L) 1.0 - 2.0    FASTING Yes             ASSESSMENT AND PLAN

## 2020-05-05 NOTE — TELEPHONE ENCOUNTER
Spoke with [facility] pharmacist, Zoran Palafox. They have 3 manufacturers for atorvastatin 80mg and all are large and oval. Options:      1) Crush - however, atorvatstatin (all 3) have a coating so may not get fully crushed.       2) order 2 tabs 40mg atorvastatin -

## 2020-05-05 NOTE — TELEPHONE ENCOUNTER
Noted below. Please check with pt. If she is okay with crushing we can have her try that. If not we can switch to crestor to see if she tolerates that. Would recommend crestor 20mg qhs to start.

## 2020-05-06 NOTE — TELEPHONE ENCOUNTER
Spoke with pt, advised of recommendation. Pt will try to crush and take with applesauce or yogurt. Asked that she update us if this is not working, will need to switch to a different medication if not.  Pt stated she would try crushing first, will call if n

## 2020-05-13 ENCOUNTER — TELEMEDICINE (OUTPATIENT)
Dept: INTERNAL MEDICINE CLINIC | Facility: CLINIC | Age: 75
End: 2020-05-13
Payer: MEDICARE

## 2020-05-13 ENCOUNTER — TELEPHONE (OUTPATIENT)
Dept: INTERNAL MEDICINE CLINIC | Facility: CLINIC | Age: 75
End: 2020-05-13

## 2020-05-13 DIAGNOSIS — R09.89 LABILE HYPERTENSION: ICD-10-CM

## 2020-05-13 DIAGNOSIS — R09.89 LABILE HYPERTENSION: Primary | ICD-10-CM

## 2020-05-13 PROCEDURE — 99213 OFFICE O/P EST LOW 20 MIN: CPT | Performed by: INTERNAL MEDICINE

## 2020-05-13 RX ORDER — AMLODIPINE BESYLATE 5 MG/1
5 TABLET ORAL DAILY
Qty: 30 TABLET | Refills: 0 | Status: SHIPPED | OUTPATIENT
Start: 2020-05-13 | End: 2020-05-27

## 2020-05-13 NOTE — TELEPHONE ENCOUNTER
Doximity Video Visit Consent    Doug Rowerick verbally consents to a Doximity Video Visit Check-In service on 5/27/2020. Patient understands that we are using a different platform that may not be as secure as our traditional telehealth platform.  Patient

## 2020-05-13 NOTE — PROGRESS NOTES
[facility]    CHIEF COMPLAINT:  Patient presents with:  Blood Pressure: uncontrolled        HISTORY OF PRESENT ILLNESS:  Video visit done today due to coronavirus pandemic.     htn is still uncontrolled.  Still with elevated readings in the pm but Value Ref Range    Cholesterol, Total 192 <200 mg/dL    HDL Cholesterol 75 (H) 40 - 59 mg/dL    Triglycerides 70 30 - 149 mg/dL    LDL Cholesterol 103 (H) <100 mg/dL    VLDL 14 0 - 30 mg/dL    Non HDL Chol 117 <130 mg/dL    FASTING Yes    COMP METABOLIC PA Every conscious effort was taken to allow for sufficient and adequate time. This billing was spent on reviewing labs, medications, radiology tests and decision making.   Appropriate medical decision-making and tests are ordered as detailed in the plan of c

## 2020-05-14 RX ORDER — AMLODIPINE BESYLATE 5 MG/1
TABLET ORAL
Qty: 90 TABLET | Refills: 0 | OUTPATIENT
Start: 2020-05-14

## 2020-05-27 ENCOUNTER — TELEMEDICINE (OUTPATIENT)
Dept: INTERNAL MEDICINE CLINIC | Facility: CLINIC | Age: 75
End: 2020-05-27
Payer: MEDICARE

## 2020-05-27 DIAGNOSIS — R09.89 LABILE HYPERTENSION: ICD-10-CM

## 2020-05-27 DIAGNOSIS — R73.03 PRE-DIABETES: ICD-10-CM

## 2020-05-27 PROCEDURE — 99213 OFFICE O/P EST LOW 20 MIN: CPT | Performed by: INTERNAL MEDICINE

## 2020-05-27 RX ORDER — AMLODIPINE BESYLATE 5 MG/1
5 TABLET ORAL 2 TIMES DAILY
Qty: 180 TABLET | Refills: 0 | Status: SHIPPED | OUTPATIENT
Start: 2020-05-27 | End: 2020-06-09

## 2020-05-27 NOTE — PROGRESS NOTES
[facility]    CHIEF COMPLAINT:  Patient presents with:  Blood Pressure        HISTORY OF PRESENT ILLNESS:  Video visit done today due to coronavirus pandemic. Htn: still mostly uncontrolled at home but improved.  Readings mostly in the 140s sy dyspnea.      DATA:  Results for orders placed or performed in visit on 11/20/19   ASSAY, THYROID STIM HORMONE   Result Value Ref Range    TSH 2.420 0.358 - 3.740 mIU/mL   HEMOGLOBIN A1C   Result Value Ref Range    HgbA1C 5.9 (H) <5.7 %    Estimated Average care. Pt advised to go to the ER or call 911 for worsening symptoms or acute distress. Please note that the following visit was completed using two-way, real-time interactive audio and/or video communication.   This has been done in good tomer to provi

## 2020-05-29 DIAGNOSIS — R09.89 LABILE HYPERTENSION: ICD-10-CM

## 2020-05-29 RX ORDER — METOPROLOL SUCCINATE 100 MG/1
TABLET, EXTENDED RELEASE ORAL
Qty: 180 TABLET | Refills: 1 | Status: SHIPPED | OUTPATIENT
Start: 2020-05-29 | End: 2020-06-09

## 2020-06-03 ENCOUNTER — APPOINTMENT (OUTPATIENT)
Dept: LAB | Facility: HOSPITAL | Age: 75
End: 2020-06-03
Attending: INTERNAL MEDICINE
Payer: MEDICARE

## 2020-06-03 DIAGNOSIS — E03.9 HYPOTHYROIDISM, UNSPECIFIED TYPE: ICD-10-CM

## 2020-06-03 DIAGNOSIS — E78.00 HYPERCHOLESTEROLEMIA: ICD-10-CM

## 2020-06-03 PROCEDURE — 84443 ASSAY THYROID STIM HORMONE: CPT

## 2020-06-03 PROCEDURE — 36415 COLL VENOUS BLD VENIPUNCTURE: CPT

## 2020-06-03 PROCEDURE — 80053 COMPREHEN METABOLIC PANEL: CPT

## 2020-06-03 PROCEDURE — 80061 LIPID PANEL: CPT

## 2020-06-09 ENCOUNTER — OFFICE VISIT (OUTPATIENT)
Dept: INTERNAL MEDICINE CLINIC | Facility: CLINIC | Age: 75
End: 2020-06-09
Payer: MEDICARE

## 2020-06-09 VITALS
WEIGHT: 171.19 LBS | SYSTOLIC BLOOD PRESSURE: 138 MMHG | BODY MASS INDEX: 28.87 KG/M2 | TEMPERATURE: 98 F | RESPIRATION RATE: 12 BRPM | DIASTOLIC BLOOD PRESSURE: 80 MMHG | HEIGHT: 64.75 IN | HEART RATE: 64 BPM

## 2020-06-09 DIAGNOSIS — R09.89 LABILE HYPERTENSION: ICD-10-CM

## 2020-06-09 DIAGNOSIS — E78.00 HYPERCHOLESTEROLEMIA: ICD-10-CM

## 2020-06-09 DIAGNOSIS — E03.9 HYPOTHYROIDISM, UNSPECIFIED TYPE: ICD-10-CM

## 2020-06-09 PROCEDURE — 99214 OFFICE O/P EST MOD 30 MIN: CPT | Performed by: INTERNAL MEDICINE

## 2020-06-09 RX ORDER — AMLODIPINE BESYLATE 5 MG/1
5 TABLET ORAL DAILY
COMMUNITY
Start: 2020-06-09 | End: 2020-06-23

## 2020-06-09 RX ORDER — METOPROLOL SUCCINATE 100 MG/1
200 TABLET, EXTENDED RELEASE ORAL EVERY EVENING
COMMUNITY
Start: 2020-06-09 | End: 2020-06-23

## 2020-06-09 RX ORDER — ATORVASTATIN CALCIUM 80 MG/1
80 TABLET, FILM COATED ORAL NIGHTLY
Qty: 90 TABLET | Refills: 0 | Status: SHIPPED | OUTPATIENT
Start: 2020-06-09 | End: 2020-10-05

## 2020-06-09 RX ORDER — LISINOPRIL 40 MG/1
40 TABLET ORAL DAILY
Qty: 90 TABLET | Refills: 0 | COMMUNITY
Start: 2020-06-09 | End: 2020-07-09

## 2020-06-09 RX ORDER — LISINOPRIL 40 MG/1
40 TABLET ORAL DAILY
Qty: 90 TABLET | Refills: 0 | Status: SHIPPED | OUTPATIENT
Start: 2020-06-09 | End: 2020-06-09

## 2020-06-09 NOTE — PROGRESS NOTES
[facility]    CHIEF COMPLAINT:  Patient presents with:  Medication Follow-Up: 1/24/2020-mammo. 6/1/16-colon,repeat 10 years        HISTORY OF PRESENT ILLNESS:  Here for med check. Htn: Taking meds regularly.  No chest pain, no shortness of breat Oropharynx normal. No tonsillar enlargement or exudates.    LUNGS: clear to auscultation  CARDIO: RRR without murmur  GI: good BS's,no masses, HSM or tenderness  MUSCULOSKELETAL:  no edema, muscle strength normal.     DATA:  Results for orders placed or per the amlodipine and check again just before noon. If still low hold the amlodipine. If higher than 110/60 then okay to take the am amlodipine around noon.    Given written instructions for all this and pt verbalized understanding.   - lisinopril 40 MG Oral T

## 2020-06-09 NOTE — PATIENT INSTRUCTIONS
Take amlodipine 5mg in the am.   Take metoprolol succinate 200mg and lisinopril 40mg in the pm.     Check bp at 8am. If reading under 110/60 do not take amlodipine yet. Check again at 11am. If higher than 110/60 okay to take amlodipine 5mg at that time.

## 2020-07-30 DIAGNOSIS — E03.9 HYPOTHYROIDISM, UNSPECIFIED TYPE: ICD-10-CM

## 2020-07-31 RX ORDER — LEVOTHYROXINE SODIUM 0.07 MG/1
TABLET ORAL
Qty: 90 TABLET | Refills: 0 | Status: SHIPPED | OUTPATIENT
Start: 2020-07-31 | End: 2020-10-24

## 2020-08-08 ENCOUNTER — MED REC SCAN ONLY (OUTPATIENT)
Dept: INTERNAL MEDICINE CLINIC | Facility: CLINIC | Age: 75
End: 2020-08-08

## 2020-09-01 ENCOUNTER — MED REC SCAN ONLY (OUTPATIENT)
Dept: INTERNAL MEDICINE CLINIC | Facility: CLINIC | Age: 75
End: 2020-09-01

## 2020-09-16 ENCOUNTER — PATIENT MESSAGE (OUTPATIENT)
Dept: INTERNAL MEDICINE CLINIC | Facility: CLINIC | Age: 75
End: 2020-09-16

## 2020-09-16 DIAGNOSIS — Z23 NEED FOR VACCINATION: Primary | ICD-10-CM

## 2020-09-16 DIAGNOSIS — R09.89 LABILE HYPERTENSION: ICD-10-CM

## 2020-09-16 RX ORDER — LISINOPRIL 40 MG/1
TABLET ORAL
Qty: 90 TABLET | Refills: 0 | Status: SHIPPED | OUTPATIENT
Start: 2020-09-16 | End: 2020-12-14

## 2020-09-16 NOTE — TELEPHONE ENCOUNTER
Okay for pt to get shingrix. Only had zostavax in 2011. Pending for approval    To schedule nurse visit.

## 2020-09-16 NOTE — TELEPHONE ENCOUNTER
From: Yi FELICIANO [facility]  To: Danisha Boston MD  Sent: 9/16/2020 12:35 PM CDT  Subject: Non-Urgent Medical Question    Have I had a shingles shot? Do I need the booster? Going for a flu shot soon. Can I get the shingles shot at the same time if I need it?

## 2020-09-22 ENCOUNTER — TELEPHONE (OUTPATIENT)
Dept: INTERNAL MEDICINE CLINIC | Facility: CLINIC | Age: 75
End: 2020-09-22

## 2020-09-22 NOTE — TELEPHONE ENCOUNTER
Spoke to pt. C/o muscle tightness between shoulder blades. States \"not really pain\". States symptom happened a couple years ago once. States symptoms have returned & become more frequent in the last couple months.   Only occurs when initiating to walk

## 2020-09-23 ENCOUNTER — OFFICE VISIT (OUTPATIENT)
Dept: INTERNAL MEDICINE CLINIC | Facility: CLINIC | Age: 75
End: 2020-09-23
Payer: MEDICARE

## 2020-09-23 ENCOUNTER — HOSPITAL ENCOUNTER (OUTPATIENT)
Dept: GENERAL RADIOLOGY | Age: 75
Discharge: HOME | End: 2020-09-23
Attending: INTERNAL MEDICINE
Payer: MEDICARE

## 2020-09-23 VITALS
HEIGHT: 64.75 IN | HEART RATE: 80 BPM | WEIGHT: 171.31 LBS | DIASTOLIC BLOOD PRESSURE: 80 MMHG | TEMPERATURE: 97 F | RESPIRATION RATE: 16 BRPM | SYSTOLIC BLOOD PRESSURE: 138 MMHG | BODY MASS INDEX: 28.89 KG/M2

## 2020-09-23 DIAGNOSIS — R09.89 LABILE HYPERTENSION: ICD-10-CM

## 2020-09-23 DIAGNOSIS — M54.9 UPPER BACK PAIN: Primary | ICD-10-CM

## 2020-09-23 DIAGNOSIS — M54.9 UPPER BACK PAIN: ICD-10-CM

## 2020-09-23 PROCEDURE — 99214 OFFICE O/P EST MOD 30 MIN: CPT | Performed by: INTERNAL MEDICINE

## 2020-09-23 PROCEDURE — 72072 X-RAY EXAM THORAC SPINE 3VWS: CPT | Performed by: INTERNAL MEDICINE

## 2020-09-23 NOTE — PROGRESS NOTES
[facility]    CHIEF COMPLAINT:  Patient presents with:  Back Pain: Has had several episodes of pain between shoulder blades. Notices when out walking or standing when doing dishes. Episodes last for a few minutes. 8/28/14-pap. Pt has hyst. 1/24/20 PHYSICAL EXAM:  /80 (BP Location: Left arm, Patient Position: Sitting, Cuff Size: adult)   Pulse 80   Temp 97 °F (36.1 °C) (Temporal)   Resp 16   Ht 64.75\"   Wt 171 lb 4.8 oz (77.7 kg)   BMI 28.73 kg/m²    GENERAL: well developed, well nourish to do this. - XR THORACIC SPINE (3 VIEWS) (CPT=72072); Future  - OP REFERRAL TO [facility]    2. Labile hypertension  Continue current meds. Return for annual wellness visit in october as planned.  Veronica Joaquin MD

## 2020-10-05 DIAGNOSIS — E78.00 HYPERCHOLESTEROLEMIA: ICD-10-CM

## 2020-10-05 RX ORDER — ATORVASTATIN CALCIUM 80 MG/1
TABLET, FILM COATED ORAL
Qty: 90 TABLET | Refills: 0 | Status: SHIPPED | OUTPATIENT
Start: 2020-10-05 | End: 2020-12-14

## 2020-10-24 DIAGNOSIS — E03.9 HYPOTHYROIDISM, UNSPECIFIED TYPE: ICD-10-CM

## 2020-10-24 RX ORDER — LEVOTHYROXINE SODIUM 0.07 MG/1
TABLET ORAL
Qty: 90 TABLET | Refills: 0 | Status: SHIPPED | OUTPATIENT
Start: 2020-10-24 | End: 2021-01-26

## 2020-10-26 ENCOUNTER — OFFICE VISIT (OUTPATIENT)
Dept: INTERNAL MEDICINE CLINIC | Facility: CLINIC | Age: 75
End: 2020-10-26
Payer: MEDICARE

## 2020-10-26 VITALS
HEART RATE: 72 BPM | WEIGHT: 172.19 LBS | DIASTOLIC BLOOD PRESSURE: 82 MMHG | RESPIRATION RATE: 12 BRPM | HEIGHT: 64.75 IN | TEMPERATURE: 97 F | SYSTOLIC BLOOD PRESSURE: 144 MMHG | BODY MASS INDEX: 29.04 KG/M2

## 2020-10-26 DIAGNOSIS — R73.03 PRE-DIABETES: ICD-10-CM

## 2020-10-26 DIAGNOSIS — E78.00 HYPERCHOLESTEROLEMIA: ICD-10-CM

## 2020-10-26 DIAGNOSIS — Z00.00 ENCOUNTER FOR ANNUAL HEALTH EXAMINATION: Primary | ICD-10-CM

## 2020-10-26 DIAGNOSIS — Z12.31 ENCOUNTER FOR SCREENING MAMMOGRAM FOR BREAST CANCER: ICD-10-CM

## 2020-10-26 DIAGNOSIS — E03.9 HYPOTHYROIDISM, UNSPECIFIED TYPE: ICD-10-CM

## 2020-10-26 DIAGNOSIS — G47.00 INSOMNIA, UNSPECIFIED TYPE: ICD-10-CM

## 2020-10-26 DIAGNOSIS — R09.89 LABILE HYPERTENSION: ICD-10-CM

## 2020-10-26 PROCEDURE — G0439 PPPS, SUBSEQ VISIT: HCPCS | Performed by: INTERNAL MEDICINE

## 2020-10-26 PROCEDURE — 99214 OFFICE O/P EST MOD 30 MIN: CPT | Performed by: INTERNAL MEDICINE

## 2020-10-26 NOTE — PATIENT INSTRUCTIONS
Yi FELICIANO [facility]'s SCREENING SCHEDULE   Tests on this list are recommended by your physician but may not be covered, or covered at this frequency, by your insurer. Please check with your insurance carrier before scheduling to verify coverage.    PREVENTATIV to patients who meet one of the following criteria:   • Men who are 73-68 years old and have smoked more than 100 cigarettes in their lifetime   • Anyone with a family history    Colorectal Cancer Screening  Covered up to Age 76     Colonoscopy Screen   Co Influenza  Covered Annually Orders placed or performed in visit on 10/25/19   • FLU VACC HIGH DOSE PRSV FREE    Please get every year    Pneumococcal 13 (Prevnar)  Covered Once after 65 Orders placed or performed in visit on 10/13/15   • PNEUMOCOCCAL VACC, Lao)  www. [email or web address] org  This link also has information from the [address] [place] [address] regarding Advance Directives.

## 2020-10-26 NOTE — PROGRESS NOTES
HPI:   Norris Olszewski is a 77year old female who presents for a med check and  Medicare Subsequent Annual Wellness visit (Pt already had Initial Annual Wellness). mammo done 1/2020. Ordered for 2021.    Dexa done 1/2020  Colonoscopy due 2026  Gets zacarias down, depressed, or hopeless (over the last two weeks)?: Several days  PHQ-2 SCORE: 2      Advanced Directive:  She does NOT have a Living Will on file in [employer]. Discussed with patient and provided information.        She has a Power of  for [facility] MEDICATIONS:     •  LEVOTHYROXINE SODIUM 75 MCG Oral Tab, TAKE ONE TABLET BY MOUTH EVERY MORNING BEFORE BREAKFAST.     •  ATORVASTATIN 80 MG Oral Tab, TAKE 1 TABLET(80 MG) BY MOUTH EVERY NIGHT    •  LISINOPRIL 40 MG Oral Tab, TAKE 1 TABLET(40 MG) BY MOUTH D General appearance: alert, appears stated age and cooperative  Neck: no lad  Lungs: clear to auscultation bilaterally  Heart: S1, S2 normal, no murmur, click, rub or gallop, regular rate and rhythm  Abdomen: soft, non-tender; bowel sounds normal; no ma counseled re sleep hygiene. Avoid reading or watching tv in bed. Counseled re avoiding liquids for a few hours before bed. Discussed possible sleep study but she declines for now.    Discussed keeping room dark but having a soft light for when she goes to Health Maintenance if applicable    Flex Sigmoidoscopy Screen every 10 years No results found for this or any previous visit. No flowsheet data found. Fecal Occult Blood Annually No results found for: FOB No flowsheet data found.     Glaucoma Screening covered with your pharmacy  prescription benefits      SPECIFIC DISEASE MONITORING Internal Lab or Procedure External Lab or Procedure      Annual Monitoring of Persistent     Medications (ACE/ARB, digoxin diuretics, anticonvulsants.)    Potassium  Annuall

## 2020-11-22 DIAGNOSIS — R09.89 LABILE HYPERTENSION: ICD-10-CM

## 2020-11-23 RX ORDER — AMLODIPINE BESYLATE 5 MG/1
TABLET ORAL
Qty: 90 TABLET | Refills: 1 | Status: SHIPPED | OUTPATIENT
Start: 2020-11-23 | End: 2021-04-26

## 2020-12-13 DIAGNOSIS — E78.00 HYPERCHOLESTEROLEMIA: ICD-10-CM

## 2020-12-13 DIAGNOSIS — R09.89 LABILE HYPERTENSION: ICD-10-CM

## 2020-12-14 RX ORDER — ATORVASTATIN CALCIUM 80 MG/1
TABLET, FILM COATED ORAL
Qty: 90 TABLET | Refills: 0 | Status: SHIPPED | OUTPATIENT
Start: 2020-12-14 | End: 2021-03-10

## 2020-12-14 RX ORDER — LISINOPRIL 40 MG/1
TABLET ORAL
Qty: 90 TABLET | Refills: 0 | Status: SHIPPED | OUTPATIENT
Start: 2020-12-14 | End: 2021-03-10

## 2020-12-15 ENCOUNTER — PATIENT MESSAGE (OUTPATIENT)
Dept: INTERNAL MEDICINE CLINIC | Facility: CLINIC | Age: 75
End: 2020-12-15

## 2020-12-15 ENCOUNTER — HOSPITAL ENCOUNTER (OUTPATIENT)
Age: 75
Discharge: HOME | End: 2020-12-15
Payer: MEDICARE

## 2020-12-15 VITALS
DIASTOLIC BLOOD PRESSURE: 82 MMHG | RESPIRATION RATE: 16 BRPM | HEART RATE: 85 BPM | OXYGEN SATURATION: 97 % | TEMPERATURE: 99 F | SYSTOLIC BLOOD PRESSURE: 124 MMHG

## 2020-12-15 DIAGNOSIS — Z20.822 PERSON UNDER INVESTIGATION FOR COVID-19: ICD-10-CM

## 2020-12-15 DIAGNOSIS — J06.9 VIRAL URI: Primary | ICD-10-CM

## 2020-12-15 PROCEDURE — 99213 OFFICE O/P EST LOW 20 MIN: CPT | Performed by: NURSE PRACTITIONER

## 2020-12-15 NOTE — TELEPHONE ENCOUNTER
From: Yi FELICIANO [facility]  To: Edwin Irwin MD  Sent: 12/15/2020 8:21 AM CST  Subject: Non-Urgent Medical Question    I have some COVID symptoms and would like to be tested. Where should I get this done.

## 2020-12-15 NOTE — ED INITIAL ASSESSMENT (HPI)
Since last Wednesday, c/o dry cough, low grade temp, and chills.  Possible exposure and is here for a covid test.

## 2020-12-15 NOTE — ED PROVIDER NOTES
Patient Seen in: Immediate [address] [place] [address]      History   Patient presents with:  Covid-19 Test: COVID test - Entered by patient  Testing    Stated Complaint: Covid-19 Test - COVID test    HPI    30-year-old female presents for COVID-19 testing.   Carole Grandchild comment: No 2nd hand exposure    Alcohol use: Yes      Alcohol/week: 4.2 standard drinks      Types: 5 drink(s) per week      Comment: 1 glass a wine nightly with dinner    Drug use:  No             Review of Systems    Positive for stated complaint: Covid- is in the timeframe to receive monoclonal antibodies, however, the patient did decline and states \"I am fine\". We discussed symptomatic treatment at home. We will have them follow-up with primary care.   We discussed reasons to return to immediate care/

## 2021-01-26 ENCOUNTER — HOSPITAL ENCOUNTER (OUTPATIENT)
Dept: MAMMOGRAPHY | Age: 76
Discharge: HOME | End: 2021-01-26
Attending: INTERNAL MEDICINE
Payer: MEDICARE

## 2021-01-26 DIAGNOSIS — E03.9 HYPOTHYROIDISM, UNSPECIFIED TYPE: ICD-10-CM

## 2021-01-26 DIAGNOSIS — Z12.31 ENCOUNTER FOR SCREENING MAMMOGRAM FOR BREAST CANCER: ICD-10-CM

## 2021-01-26 PROCEDURE — 77067 SCR MAMMO BI INCL CAD: CPT | Performed by: INTERNAL MEDICINE

## 2021-01-26 PROCEDURE — 77063 BREAST TOMOSYNTHESIS BI: CPT | Performed by: INTERNAL MEDICINE

## 2021-01-26 RX ORDER — LEVOTHYROXINE SODIUM 0.07 MG/1
TABLET ORAL
Qty: 90 TABLET | Refills: 0 | Status: SHIPPED | OUTPATIENT
Start: 2021-01-26 | End: 2021-05-03

## 2021-03-08 DIAGNOSIS — Z23 NEED FOR VACCINATION: ICD-10-CM

## 2021-03-10 ENCOUNTER — IMMUNIZATION (OUTPATIENT)
Dept: LAB | Age: 76
End: 2021-03-10
Attending: HOSPITALIST
Payer: MEDICARE

## 2021-03-10 DIAGNOSIS — E78.00 HYPERCHOLESTEROLEMIA: ICD-10-CM

## 2021-03-10 DIAGNOSIS — R09.89 LABILE HYPERTENSION: ICD-10-CM

## 2021-03-10 DIAGNOSIS — Z23 NEED FOR VACCINATION: Primary | ICD-10-CM

## 2021-03-10 PROCEDURE — 0001A SARSCOV2 VAC 30MCG/0.3ML IM: CPT

## 2021-03-10 RX ORDER — LISINOPRIL 40 MG/1
TABLET ORAL
Qty: 90 TABLET | Refills: 0 | Status: SHIPPED | OUTPATIENT
Start: 2021-03-10 | End: 2021-04-26

## 2021-03-10 RX ORDER — ATORVASTATIN CALCIUM 80 MG/1
TABLET, FILM COATED ORAL
Qty: 90 TABLET | Refills: 0 | Status: SHIPPED | OUTPATIENT
Start: 2021-03-10 | End: 2021-04-26

## 2021-03-31 ENCOUNTER — IMMUNIZATION (OUTPATIENT)
Dept: LAB | Age: 76
End: 2021-03-31
Attending: HOSPITALIST
Payer: MEDICARE

## 2021-03-31 DIAGNOSIS — Z23 NEED FOR VACCINATION: Primary | ICD-10-CM

## 2021-03-31 PROCEDURE — 0002A SARSCOV2 VAC 30MCG/0.3ML IM: CPT

## 2021-04-14 ENCOUNTER — LAB ENCOUNTER (OUTPATIENT)
Dept: LAB | Age: 76
End: 2021-04-14
Attending: OBSTETRICS & GYNECOLOGY
Payer: MEDICARE

## 2021-04-14 DIAGNOSIS — C57.00 MALIGNANT NEOPLASM OF FALLOPIAN TUBE (HCC): Primary | ICD-10-CM

## 2021-04-14 PROCEDURE — 36415 COLL VENOUS BLD VENIPUNCTURE: CPT

## 2021-04-14 PROCEDURE — 86304 IMMUNOASSAY TUMOR CA 125: CPT

## 2021-04-19 ENCOUNTER — PATIENT MESSAGE (OUTPATIENT)
Dept: INTERNAL MEDICINE CLINIC | Facility: CLINIC | Age: 76
End: 2021-04-19

## 2021-04-19 ENCOUNTER — LAB ENCOUNTER (OUTPATIENT)
Dept: LAB | Age: 76
End: 2021-04-19
Attending: INTERNAL MEDICINE
Payer: MEDICARE

## 2021-04-19 DIAGNOSIS — R73.03 PRE-DIABETES: ICD-10-CM

## 2021-04-19 DIAGNOSIS — E78.00 HYPERCHOLESTEROLEMIA: ICD-10-CM

## 2021-04-19 DIAGNOSIS — E03.9 HYPOTHYROIDISM, UNSPECIFIED TYPE: ICD-10-CM

## 2021-04-19 PROCEDURE — 80061 LIPID PANEL: CPT

## 2021-04-19 PROCEDURE — 36415 COLL VENOUS BLD VENIPUNCTURE: CPT

## 2021-04-19 PROCEDURE — 80053 COMPREHEN METABOLIC PANEL: CPT

## 2021-04-19 PROCEDURE — 83036 HEMOGLOBIN GLYCOSYLATED A1C: CPT

## 2021-04-19 PROCEDURE — 84443 ASSAY THYROID STIM HORMONE: CPT

## 2021-04-19 NOTE — TELEPHONE ENCOUNTER
From: Yi FELICIANO [facility]  To: Isaiah Hudson MD  Sent: 4/19/2021 1:20 PM CDT  Subject: Non-Urgent Medical Question    Constantly have the need to clear my throat with no results. Allergies? Knee injury-seems to be improving but not 100%.

## 2021-04-26 ENCOUNTER — TELEPHONE (OUTPATIENT)
Dept: INTERNAL MEDICINE CLINIC | Facility: CLINIC | Age: 76
End: 2021-04-26

## 2021-04-26 ENCOUNTER — OFFICE VISIT (OUTPATIENT)
Dept: INTERNAL MEDICINE CLINIC | Facility: CLINIC | Age: 76
End: 2021-04-26
Payer: MEDICARE

## 2021-04-26 VITALS
TEMPERATURE: 98 F | DIASTOLIC BLOOD PRESSURE: 90 MMHG | RESPIRATION RATE: 16 BRPM | BODY MASS INDEX: 29.07 KG/M2 | HEIGHT: 64.75 IN | WEIGHT: 172.38 LBS | HEART RATE: 76 BPM | SYSTOLIC BLOOD PRESSURE: 164 MMHG

## 2021-04-26 DIAGNOSIS — E78.00 HYPERCHOLESTEROLEMIA: ICD-10-CM

## 2021-04-26 DIAGNOSIS — M25.561 ACUTE PAIN OF RIGHT KNEE: ICD-10-CM

## 2021-04-26 DIAGNOSIS — R09.89 LABILE HYPERTENSION: ICD-10-CM

## 2021-04-26 DIAGNOSIS — J30.9 ALLERGIC RHINITIS, UNSPECIFIED SEASONALITY, UNSPECIFIED TRIGGER: ICD-10-CM

## 2021-04-26 DIAGNOSIS — R73.03 PRE-DIABETES: ICD-10-CM

## 2021-04-26 PROCEDURE — 99214 OFFICE O/P EST MOD 30 MIN: CPT | Performed by: INTERNAL MEDICINE

## 2021-04-26 RX ORDER — LORATADINE 10 MG/1
1 CAPSULE, LIQUID FILLED ORAL DAILY
Qty: 90 CAPSULE | Refills: 0 | Status: SHIPPED | OUTPATIENT
Start: 2021-04-26 | End: 2021-05-26

## 2021-04-26 RX ORDER — ATORVASTATIN CALCIUM 80 MG/1
80 TABLET, FILM COATED ORAL NIGHTLY
Qty: 90 TABLET | Refills: 0 | Status: SHIPPED | OUTPATIENT
Start: 2021-04-26 | End: 2021-09-03

## 2021-04-26 RX ORDER — AMLODIPINE BESYLATE 5 MG/1
5 TABLET ORAL 2 TIMES DAILY
Qty: 180 TABLET | Refills: 0 | Status: SHIPPED | OUTPATIENT
Start: 2021-04-26 | End: 2021-04-26

## 2021-04-26 RX ORDER — AMLODIPINE BESYLATE 5 MG/1
5 TABLET ORAL 2 TIMES DAILY
Qty: 180 TABLET | Refills: 0 | Status: SHIPPED | OUTPATIENT
Start: 2021-04-26 | End: 2021-07-27

## 2021-04-26 RX ORDER — FLUTICASONE PROPIONATE 50 MCG
1 SPRAY, SUSPENSION (ML) NASAL DAILY
Qty: 1 BOTTLE | Refills: 3 | Status: SHIPPED | OUTPATIENT
Start: 2021-04-26 | End: 2021-10-29

## 2021-04-26 RX ORDER — LISINOPRIL 40 MG/1
40 TABLET ORAL DAILY
Qty: 90 TABLET | Refills: 0 | Status: SHIPPED | OUTPATIENT
Start: 2021-04-26 | End: 2021-09-03

## 2021-04-26 NOTE — PROGRESS NOTES
[facility]    CHIEF COMPLAINT:  Patient presents with:  Medication Follow-Up: 8/28/14-pap. 1/26/21-mammo. 6/1/16-colon-repeat 10 years  Knee Pain: right knee pain after getting out of car 1 month ago. Still has pain and swelling.   Allergies: snee mouth nightly. 90 tablet 0   • Loratadine (CLARITIN) 10 MG Oral Cap Take 1 tablet by mouth daily. 90 capsule 0   • Fluticasone Propionate 50 MCG/ACT Nasal Suspension 1 spray by Each Nare route daily.  1 Bottle 3   • LEVOTHYROXINE SODIUM 75 MCG Oral Tab TAKE BUN/CREA Ratio 14.9 10.0 - 20.0    Calcium, Total 9.1 8.5 - 10.1 mg/dL    Calculated Osmolality 289 275 - 295 mOsm/kg    GFR, Non- 60 >=60    GFR, -American 69 >=60    AST 24 15 - 37 U/L    ALT 28 13 - 56 U/L    Alkaline Phosphatase

## 2021-04-26 NOTE — TELEPHONE ENCOUNTER
Wyatt called to clarify Amlodipine sig; it has two diff sigs      Today's visit note    Increase amlodipine to 5mg bid. She is unable to tolerate 10mg daily as it is too much for her at once. Continue to monitor bp at home.    - amLODIPine Besylate 5

## 2021-05-03 DIAGNOSIS — E03.9 HYPOTHYROIDISM, UNSPECIFIED TYPE: ICD-10-CM

## 2021-05-03 RX ORDER — LEVOTHYROXINE SODIUM 0.07 MG/1
TABLET ORAL
Qty: 90 TABLET | Refills: 1 | Status: SHIPPED | OUTPATIENT
Start: 2021-05-03 | End: 2021-10-15

## 2021-05-05 ENCOUNTER — MED REC SCAN ONLY (OUTPATIENT)
Dept: INTERNAL MEDICINE CLINIC | Facility: CLINIC | Age: 76
End: 2021-05-05

## 2021-06-10 ENCOUNTER — PATIENT MESSAGE (OUTPATIENT)
Dept: INTERNAL MEDICINE CLINIC | Facility: CLINIC | Age: 76
End: 2021-06-10

## 2021-06-10 NOTE — TELEPHONE ENCOUNTER
From: Yi FELICIANO [facility]  To: Linda Leal MD  Sent: 6/10/2021 9:26 AM CDT  Subject: Referral Request    Please refer me to an ENT physician as this issue must be resolved.

## 2021-06-18 ENCOUNTER — PATIENT MESSAGE (OUTPATIENT)
Dept: INTERNAL MEDICINE CLINIC | Facility: CLINIC | Age: 76
End: 2021-06-18

## 2021-07-13 ENCOUNTER — MED REC SCAN ONLY (OUTPATIENT)
Dept: INTERNAL MEDICINE CLINIC | Facility: CLINIC | Age: 76
End: 2021-07-13

## 2021-07-27 DIAGNOSIS — R09.89 LABILE HYPERTENSION: ICD-10-CM

## 2021-07-27 RX ORDER — AMLODIPINE BESYLATE 5 MG/1
TABLET ORAL
Qty: 180 TABLET | Refills: 0 | Status: SHIPPED | OUTPATIENT
Start: 2021-07-27

## 2021-08-02 ENCOUNTER — PATIENT MESSAGE (OUTPATIENT)
Dept: INTERNAL MEDICINE CLINIC | Facility: CLINIC | Age: 76
End: 2021-08-02

## 2021-08-02 NOTE — TELEPHONE ENCOUNTER
Spoke with Butch Laurent at Dr. Walter Starch office  Requested to send over sinus xray results for Dr. Sanjay Brizuela to review  bOombate message sent to pt

## 2021-08-02 NOTE — TELEPHONE ENCOUNTER
From: Yi FELICIANO [facility]  To:  Joan Cleary MD  Sent: 8/2/2021 12:07 PM CDT  Subject: Non-Urgent Medical Question    Dr. Leydi Briones,   After using Fluticascone Propionate nasal spray, Azelastine HCI nasal solution, NasalCrom and taking Omeprazole   There has been no

## 2021-08-05 NOTE — TELEPHONE ENCOUNTER
Noted no improvement with omeprazole. Noted that dr. Mukesh Luna did do a flexible laryngoscope, results noted in his first note. Reviewed ct sinuses report done at ent. Shows a deviated septum and mild and chronic maxillary and ethmoid sinusitis.  This is l

## 2021-08-11 ENCOUNTER — MED REC SCAN ONLY (OUTPATIENT)
Dept: INTERNAL MEDICINE CLINIC | Facility: CLINIC | Age: 76
End: 2021-08-11

## 2021-09-03 DIAGNOSIS — E78.00 HYPERCHOLESTEROLEMIA: ICD-10-CM

## 2021-09-03 DIAGNOSIS — R09.89 LABILE HYPERTENSION: ICD-10-CM

## 2021-09-03 RX ORDER — ATORVASTATIN CALCIUM 80 MG/1
TABLET, FILM COATED ORAL
Qty: 90 TABLET | Refills: 0 | Status: SHIPPED | OUTPATIENT
Start: 2021-09-03 | End: 2021-11-29

## 2021-09-03 RX ORDER — LISINOPRIL 40 MG/1
40 TABLET ORAL DAILY
Qty: 90 TABLET | Refills: 0 | Status: SHIPPED | OUTPATIENT
Start: 2021-09-03 | End: 2021-10-13

## 2021-10-12 ENCOUNTER — PATIENT MESSAGE (OUTPATIENT)
Dept: INTERNAL MEDICINE CLINIC | Facility: CLINIC | Age: 76
End: 2021-10-12

## 2021-10-12 NOTE — TELEPHONE ENCOUNTER
From: Yi FELICIANO [facility]  To: Isaiah Hudson MD  Sent: 10/12/2021 3:12 PM CDT  Subject: Throat clearing    Dear Dr. Natanael Moise,   My ’s former nurse brought me the booster vaccine today and commented about my throat clearing.  She suggested we look into the pos

## 2021-10-13 ENCOUNTER — TELEPHONE (OUTPATIENT)
Dept: INTERNAL MEDICINE CLINIC | Facility: CLINIC | Age: 76
End: 2021-10-13

## 2021-10-13 ENCOUNTER — PATIENT MESSAGE (OUTPATIENT)
Dept: INTERNAL MEDICINE CLINIC | Facility: CLINIC | Age: 76
End: 2021-10-13

## 2021-10-13 ENCOUNTER — TELEMEDICINE (OUTPATIENT)
Dept: INTERNAL MEDICINE CLINIC | Facility: CLINIC | Age: 76
End: 2021-10-13

## 2021-10-13 DIAGNOSIS — R68.89 CHRONIC THROAT CLEARING: ICD-10-CM

## 2021-10-13 DIAGNOSIS — R09.89 LABILE HYPERTENSION: Primary | ICD-10-CM

## 2021-10-13 DIAGNOSIS — R09.89 LABILE HYPERTENSION: ICD-10-CM

## 2021-10-13 PROCEDURE — 99214 OFFICE O/P EST MOD 30 MIN: CPT | Performed by: INTERNAL MEDICINE

## 2021-10-13 RX ORDER — LOSARTAN POTASSIUM 50 MG/1
TABLET ORAL
Qty: 90 TABLET | Refills: 0 | OUTPATIENT
Start: 2021-10-13

## 2021-10-13 RX ORDER — OMEPRAZOLE 40 MG/1
40 CAPSULE, DELAYED RELEASE ORAL DAILY
COMMUNITY
Start: 2021-06-21 | End: 2021-10-13

## 2021-10-13 RX ORDER — LOSARTAN POTASSIUM 50 MG/1
50 TABLET ORAL DAILY
Qty: 30 TABLET | Refills: 0 | Status: SHIPPED | OUTPATIENT
Start: 2021-10-13 | End: 2021-11-03

## 2021-10-13 NOTE — TELEPHONE ENCOUNTER
Pt called and stated that their was an issue with their prescription. Pt stated that they were calling to have their new medication filled out, however, the pharmacy called and stated no script came in for losartan 50 MG Oral Tab.  Pt would like for the req

## 2021-10-13 NOTE — TELEPHONE ENCOUNTER
Virtual/Telephone Consent    Yi FELICIANO [facility] verbally {consents to a Virtual/Telephone Check-In service on 10/13/21  Patient understands and accepts financial responsibility for any deductible, co-insurance and/or co-pays associated with this service.

## 2021-10-13 NOTE — TELEPHONE ENCOUNTER
Called pharmacy to verify RX sent over  Pharmacist stated RX is ready for   Called pt and notified  Pt v/u

## 2021-10-13 NOTE — PROGRESS NOTES
Virtual Telephone Check-In    [address] verbally consents to a Virtual/Telephone Check-In visit on 10/13/21. Patient has been referred to the [facility] website at [email or web address] to review the yearly Consent to Treat document.     Patient Goldendebra Constantin Fluticasone Propionate 50 MCG/ACT Nasal Suspension 1 spray by Each Nare route daily. 1 Bottle 3   • acetaminophen 500 MG Oral Tab Take 500 mg by mouth every 6 (six) hours as needed for Pain.      • Cholecalciferol (VITAMIN D OR) Take 1,000 Units by mouth ev hypertension  Continue amlodipine. Stop lisinopril. Start losartan 50mg daily. Check bp at home and bring in to her next visit. If bp too low she can take half tablet daily. Let me know if any persistent lightheadedness or dizziness.    - losartan 5

## 2021-10-15 DIAGNOSIS — E03.9 HYPOTHYROIDISM, UNSPECIFIED TYPE: ICD-10-CM

## 2021-10-15 RX ORDER — LEVOTHYROXINE SODIUM 0.07 MG/1
TABLET ORAL
Qty: 90 TABLET | Refills: 0 | Status: SHIPPED | OUTPATIENT
Start: 2021-10-15 | End: 2022-01-20

## 2021-10-29 ENCOUNTER — OFFICE VISIT (OUTPATIENT)
Dept: INTERNAL MEDICINE CLINIC | Facility: CLINIC | Age: 76
End: 2021-10-29
Payer: MEDICARE

## 2021-10-29 VITALS
HEIGHT: 64.5 IN | DIASTOLIC BLOOD PRESSURE: 90 MMHG | HEART RATE: 80 BPM | WEIGHT: 167.38 LBS | SYSTOLIC BLOOD PRESSURE: 150 MMHG | TEMPERATURE: 99 F | BODY MASS INDEX: 28.23 KG/M2 | RESPIRATION RATE: 12 BRPM

## 2021-10-29 DIAGNOSIS — E03.9 HYPOTHYROIDISM, UNSPECIFIED TYPE: ICD-10-CM

## 2021-10-29 DIAGNOSIS — M25.561 ACUTE PAIN OF RIGHT KNEE: ICD-10-CM

## 2021-10-29 DIAGNOSIS — M79.671 RIGHT FOOT PAIN: ICD-10-CM

## 2021-10-29 DIAGNOSIS — J30.9 ALLERGIC RHINITIS, UNSPECIFIED SEASONALITY, UNSPECIFIED TRIGGER: ICD-10-CM

## 2021-10-29 DIAGNOSIS — R73.03 PRE-DIABETES: ICD-10-CM

## 2021-10-29 DIAGNOSIS — R09.89 LABILE HYPERTENSION: ICD-10-CM

## 2021-10-29 DIAGNOSIS — Z00.00 ENCOUNTER FOR ANNUAL HEALTH EXAMINATION: ICD-10-CM

## 2021-10-29 DIAGNOSIS — E78.00 HYPERCHOLESTEROLEMIA: ICD-10-CM

## 2021-10-29 DIAGNOSIS — Z78.0 POSTMENOPAUSAL: ICD-10-CM

## 2021-10-29 DIAGNOSIS — Z12.31 SCREENING MAMMOGRAM, ENCOUNTER FOR: ICD-10-CM

## 2021-10-29 PROCEDURE — G0439 PPPS, SUBSEQ VISIT: HCPCS | Performed by: INTERNAL MEDICINE

## 2021-10-29 PROCEDURE — 99214 OFFICE O/P EST MOD 30 MIN: CPT | Performed by: INTERNAL MEDICINE

## 2021-10-29 NOTE — PATIENT INSTRUCTIONS
Yi FELICIANO [facility]'s SCREENING SCHEDULE   Tests on this list are recommended by your physician but may not be covered, or covered at this frequency, by your insurer. Please check with your insurance carrier before scheduling to verify coverage.    SRIKANTH 01/24/2020      No recommendations at this time   Pap and Pelvic    Pap   Covered every 2 years for women at normal risk;  Annually if at high risk -  No recommendations at this time    Chlamydia Annually if high risk -  No recommendations at this time   Sc the [place]. This site has a lot of good information including definitions of the different types of Advance Directives.  It also has the State forms available on it's website for anyone to review and print using their home computer a

## 2021-10-29 NOTE — PROGRESS NOTES
HPI:   En Ureña is a 77year old female who presents for a med check and Medicare Subsequent Annual Wellness visit (Pt already had Initial Annual Wellness). Htn: Taking meds regularly. No chest pain, no shortness of breath.  Tolerating losartan wel WEEKS   Little interest or pleasure in doing things: Not at all  Feeling down, depressed, or hopeless: Not at all (once)  PHQ-2 SCORE: 0      Advanced Directive:  She does NOT have a Living Will on file in [place].    Discussed with patient and provided inform MEDICATIONS:   LEVOTHYROXINE 75 MCG Oral Tab, TAKE ONE TABLET BY MOUTH EVERY MORNING BEFORE BREAKFAST.  losartan 50 MG Oral Tab, Take 1 tablet (50 mg total) by mouth daily.   ATORVASTATIN 80 MG Oral Tab, TAKE 1 TABLET(80 MG) BY MOUTH EVERY NIGHT  AMLODIPINE following:    Height as of this encounter: 5' 4.5\" (1.638 m). Weight as of this encounter: 167 lb 6.4 oz (75.9 kg).     Medicare Hearing Assessment  (Required for AWV/SWV)    Hearing Screening    Screening Method: Finger Rub  Finger Rub Result: Pass DENSITOMETRY (CPT=77080); Future    Labile hypertension  Continue meds. She can finish what lisinopril she has at home and then stay on losartan as it is working well.      Hypothyroidism, unspecified type  -     ASSAY, THYROID STIM HORMONE; Future  Elma Myas Sugar /  Glucose    One screening every 12 months if never tested or if previously tested but not diagnosed with pre-diabetes   One screening every 6 months if diagnosed with pre-diabetes Lab Results   Component Value Date     (H) 04/19/2021 baseline mammogram covered for patients aged 32-38 -    No recommendations at this time    Immunizations    Influenza Covered once per flu season  Please get every year 10/12/2021  No recommendations at this time    Pneumococcal Each vaccine Riccardo RENO

## 2021-11-01 DIAGNOSIS — R68.89 CHRONIC THROAT CLEARING: ICD-10-CM

## 2021-11-01 DIAGNOSIS — R09.89 LABILE HYPERTENSION: ICD-10-CM

## 2021-11-03 ENCOUNTER — PATIENT MESSAGE (OUTPATIENT)
Dept: INTERNAL MEDICINE CLINIC | Facility: CLINIC | Age: 76
End: 2021-11-03

## 2021-11-03 RX ORDER — LOSARTAN POTASSIUM 50 MG/1
TABLET ORAL
Qty: 90 TABLET | Refills: 0 | Status: SHIPPED | OUTPATIENT
Start: 2021-11-03 | End: 2022-01-29

## 2021-11-04 ENCOUNTER — LAB ENCOUNTER (OUTPATIENT)
Dept: LAB | Age: 76
End: 2021-11-04
Attending: INTERNAL MEDICINE
Payer: MEDICARE

## 2021-11-04 DIAGNOSIS — E78.00 HYPERCHOLESTEROLEMIA: ICD-10-CM

## 2021-11-04 DIAGNOSIS — J30.1 ALLERGIC RHINITIS DUE TO POLLEN: Primary | ICD-10-CM

## 2021-11-04 DIAGNOSIS — E03.9 HYPOTHYROIDISM, UNSPECIFIED TYPE: ICD-10-CM

## 2021-11-04 PROCEDURE — 80061 LIPID PANEL: CPT

## 2021-11-04 PROCEDURE — 80053 COMPREHEN METABOLIC PANEL: CPT

## 2021-11-04 PROCEDURE — 82785 ASSAY OF IGE: CPT

## 2021-11-04 PROCEDURE — 86003 ALLG SPEC IGE CRUDE XTRC EA: CPT

## 2021-11-04 PROCEDURE — 84443 ASSAY THYROID STIM HORMONE: CPT

## 2021-11-04 PROCEDURE — 36415 COLL VENOUS BLD VENIPUNCTURE: CPT

## 2021-11-04 NOTE — TELEPHONE ENCOUNTER
From: Yi FELICIANO [facility]  To: Tami Davenport MD  Sent: 11/3/2021 8:27 AM CDT  Subject: Losartan refil    My original Rx was for 30 as a trial. Now I would like to receive a 90 day supply as this is replacing Lisinopril. [facility] needs your consent to do this.

## 2021-11-10 NOTE — PROGRESS NOTES
Please call and inform food allergy testing is positive for shellfish please find out if any symptoms with shellfish

## 2021-11-28 DIAGNOSIS — E78.00 HYPERCHOLESTEROLEMIA: ICD-10-CM

## 2021-11-29 RX ORDER — ATORVASTATIN CALCIUM 80 MG/1
TABLET, FILM COATED ORAL
Qty: 90 TABLET | Refills: 1 | Status: SHIPPED | OUTPATIENT
Start: 2021-11-29

## 2021-12-09 ENCOUNTER — OFFICE VISIT (OUTPATIENT)
Dept: PODIATRY CLINIC | Facility: CLINIC | Age: 76
End: 2021-12-09
Payer: MEDICARE

## 2021-12-09 VITALS — SYSTOLIC BLOOD PRESSURE: 142 MMHG | DIASTOLIC BLOOD PRESSURE: 80 MMHG

## 2021-12-09 DIAGNOSIS — M19.079 ARTHRITIS OF MIDFOOT: ICD-10-CM

## 2021-12-09 DIAGNOSIS — M79.671 RIGHT FOOT PAIN: Primary | ICD-10-CM

## 2021-12-09 PROCEDURE — 99203 OFFICE O/P NEW LOW 30 MIN: CPT | Performed by: STUDENT IN AN ORGANIZED HEALTH CARE EDUCATION/TRAINING PROGRAM

## 2021-12-09 PROCEDURE — 20605 DRAIN/INJ JOINT/BURSA W/O US: CPT | Performed by: STUDENT IN AN ORGANIZED HEALTH CARE EDUCATION/TRAINING PROGRAM

## 2021-12-09 RX ORDER — DEXAMETHASONE SODIUM PHOSPHATE 4 MG/ML
2 VIAL (ML) INJECTION ONCE
Status: COMPLETED | OUTPATIENT
Start: 2021-12-09 | End: 2021-12-09

## 2021-12-09 NOTE — PROGRESS NOTES
Per Dr Anant Retana  to draw up .5ml of dexamethasone sodium phosphate, .5ml kenalog-10 and 1ml marcaine 0.5% for a right foot injection.

## 2021-12-10 NOTE — PROGRESS NOTES
[facility] Podiatry  Progress Note    En Ureña is a 77year old female. Patient presents with:  Consult: Right foot pain over 8months. No injuries.  shooting pain 10/10        HPI:     Patient is a pleasant 59-year-old female presents to clinic f cancer   • COLONOSCOPY  ?   • GLAUCOMA SURGERY  ?   • HYSTERECTOMY  April 2015    stage 1c, fallopian tube cancer, Dr. Randi Ascencio, robotic, EDW   • [address] [facility], [facility]   • OOPHORECTOMY  2015   • OTHER SURGICAL HISTORY      bilateral cataract surgery   • OTH Sitting, Cuff Size: adult)   GENERAL: well developed, well nourished, in no apparent distress  EXTREMITIES:   1. Integument: Normal skin temperature and turgor  2.  Vascular: Dorsalis pedis two out of four bilateral and posterior tibial pulses two out of midfoot fusion procedures. Patient is not interested in surgery at this time. The patient indicates understanding of these issues and agrees to the plan. Return in about 2 months (around 2/9/2022) for Midfoot arthritis follow-up.     SOULEYMANE Sutherland

## 2022-01-20 DIAGNOSIS — E03.9 HYPOTHYROIDISM, UNSPECIFIED TYPE: ICD-10-CM

## 2022-01-20 RX ORDER — LEVOTHYROXINE SODIUM 0.07 MG/1
TABLET ORAL
Qty: 90 TABLET | Refills: 1 | Status: SHIPPED | OUTPATIENT
Start: 2022-01-20

## 2022-01-27 ENCOUNTER — HOSPITAL ENCOUNTER (OUTPATIENT)
Dept: MAMMOGRAPHY | Age: 77
Discharge: HOME | End: 2022-01-27
Attending: INTERNAL MEDICINE
Payer: MEDICARE

## 2022-01-27 DIAGNOSIS — Z12.31 SCREENING MAMMOGRAM, ENCOUNTER FOR: ICD-10-CM

## 2022-01-27 PROCEDURE — 77067 SCR MAMMO BI INCL CAD: CPT | Performed by: INTERNAL MEDICINE

## 2022-01-27 PROCEDURE — 77063 BREAST TOMOSYNTHESIS BI: CPT | Performed by: INTERNAL MEDICINE

## 2022-01-29 DIAGNOSIS — R68.89 CHRONIC THROAT CLEARING: ICD-10-CM

## 2022-01-29 DIAGNOSIS — R09.89 LABILE HYPERTENSION: ICD-10-CM

## 2022-01-29 RX ORDER — LOSARTAN POTASSIUM 50 MG/1
TABLET ORAL
Qty: 90 TABLET | Refills: 0 | Status: SHIPPED | OUTPATIENT
Start: 2022-01-29

## 2022-02-17 ENCOUNTER — PATIENT MESSAGE (OUTPATIENT)
Dept: INTERNAL MEDICINE CLINIC | Facility: CLINIC | Age: 77
End: 2022-02-17

## 2022-02-25 ENCOUNTER — TELEPHONE (OUTPATIENT)
Dept: INTERNAL MEDICINE CLINIC | Facility: CLINIC | Age: 77
End: 2022-02-25

## 2022-03-18 ENCOUNTER — PATIENT MESSAGE (OUTPATIENT)
Dept: INTERNAL MEDICINE CLINIC | Facility: CLINIC | Age: 77
End: 2022-03-18

## 2022-03-18 RX ORDER — AMLODIPINE BESYLATE 5 MG/1
TABLET ORAL
Qty: 180 TABLET | Refills: 0 | OUTPATIENT
Start: 2022-03-18

## 2022-03-18 NOTE — TELEPHONE ENCOUNTER
RX denied with note to pharmacy to schedule an appointment. A my chart message sent to patient to call office.

## 2022-03-18 NOTE — TELEPHONE ENCOUNTER
From: Jose Carlos Laguna  Sent: 3/18/2022 9:19 AM CDT  To: [facility] Clinical Staff  Subject: refill request    With this Rx I have finally achieved some normalcy with readings of average 131/81. My yearly appointment is in April. Can we address it then?

## 2022-03-21 RX ORDER — AMLODIPINE BESYLATE 5 MG/1
TABLET ORAL
Qty: 80 TABLET | Refills: 0 | Status: SHIPPED | OUTPATIENT
Start: 2022-03-21 | End: 2022-03-22

## 2022-03-21 RX ORDER — AMLODIPINE BESYLATE 5 MG/1
TABLET ORAL
Qty: 180 TABLET | Refills: 0 | OUTPATIENT
Start: 2022-03-21

## 2022-03-21 NOTE — TELEPHONE ENCOUNTER
Spoke w/pt   Pt confirmed she is taking 5mg of amlodipine 2 times a day - takes 1 pill in the morning and 1 at night   Pt reports she has 7 days worth of medication left  Her appt is 4/29/22 at 1040am with Dr. Hortencia Heredia over rx to get her to next appt

## 2022-03-21 NOTE — TELEPHONE ENCOUNTER
Spoke w/pt   Pt confirmed she is taking 5mg of amlodipine 2 times a day - takes 1 pill in the morning and 1 at night   Pt reports she has 7 days worth of medication left  Confirmed pharmacy   Her appt is 4/29/22 at 1040am with Dr. Indra Key over rx to get her to next appt

## 2022-03-22 RX ORDER — AMLODIPINE BESYLATE 5 MG/1
TABLET ORAL
Qty: 180 TABLET | Refills: 0 | Status: SHIPPED | OUTPATIENT
Start: 2022-03-22

## 2022-04-28 ENCOUNTER — HOSPITAL ENCOUNTER (OUTPATIENT)
Dept: BONE DENSITY | Age: 77
Discharge: HOME | End: 2022-04-28
Attending: INTERNAL MEDICINE
Payer: MEDICARE

## 2022-04-28 DIAGNOSIS — Z78.0 POSTMENOPAUSAL: ICD-10-CM

## 2022-04-28 PROCEDURE — 77080 DXA BONE DENSITY AXIAL: CPT | Performed by: INTERNAL MEDICINE

## 2022-04-29 ENCOUNTER — LAB ENCOUNTER (OUTPATIENT)
Dept: LAB | Age: 77
End: 2022-04-29
Attending: INTERNAL MEDICINE
Payer: MEDICARE

## 2022-04-29 ENCOUNTER — HOSPITAL ENCOUNTER (OUTPATIENT)
Dept: GENERAL RADIOLOGY | Age: 77
Discharge: HOME | End: 2022-04-29
Attending: INTERNAL MEDICINE
Payer: MEDICARE

## 2022-04-29 ENCOUNTER — OFFICE VISIT (OUTPATIENT)
Dept: INTERNAL MEDICINE CLINIC | Facility: CLINIC | Age: 77
End: 2022-04-29
Payer: MEDICARE

## 2022-04-29 VITALS
HEART RATE: 76 BPM | TEMPERATURE: 97 F | HEIGHT: 64.75 IN | SYSTOLIC BLOOD PRESSURE: 164 MMHG | BODY MASS INDEX: 28.84 KG/M2 | DIASTOLIC BLOOD PRESSURE: 90 MMHG | WEIGHT: 171 LBS | RESPIRATION RATE: 12 BRPM

## 2022-04-29 DIAGNOSIS — R39.15 URINARY URGENCY: ICD-10-CM

## 2022-04-29 DIAGNOSIS — R09.89 LABILE HYPERTENSION: ICD-10-CM

## 2022-04-29 DIAGNOSIS — R19.4 CHANGE IN BOWEL HABITS: ICD-10-CM

## 2022-04-29 DIAGNOSIS — E03.9 HYPOTHYROIDISM, UNSPECIFIED TYPE: ICD-10-CM

## 2022-04-29 DIAGNOSIS — E78.00 HYPERCHOLESTEROLEMIA: ICD-10-CM

## 2022-04-29 DIAGNOSIS — R09.89 CHRONIC THROAT CLEARING: ICD-10-CM

## 2022-04-29 DIAGNOSIS — M25.561 CHRONIC PAIN OF RIGHT KNEE: ICD-10-CM

## 2022-04-29 DIAGNOSIS — G89.29 CHRONIC PAIN OF RIGHT KNEE: ICD-10-CM

## 2022-04-29 DIAGNOSIS — K21.9 GASTROESOPHAGEAL REFLUX DISEASE, UNSPECIFIED WHETHER ESOPHAGITIS PRESENT: ICD-10-CM

## 2022-04-29 DIAGNOSIS — M54.2 NECK PAIN: ICD-10-CM

## 2022-04-29 LAB
BILIRUB UR QL STRIP.AUTO: NEGATIVE
CLARITY UR REFRACT.AUTO: CLEAR
COLOR UR AUTO: YELLOW
GLUCOSE UR STRIP.AUTO-MCNC: NEGATIVE MG/DL
KETONES UR STRIP.AUTO-MCNC: NEGATIVE MG/DL
LEUKOCYTE ESTERASE UR QL STRIP.AUTO: NEGATIVE
NITRITE UR QL STRIP.AUTO: NEGATIVE
PH UR STRIP.AUTO: 7 [PH] (ref 5–8)
PROT UR STRIP.AUTO-MCNC: NEGATIVE MG/DL
RBC UR QL AUTO: NEGATIVE
SP GR UR STRIP.AUTO: 1.01 (ref 1–1.03)
UROBILINOGEN UR STRIP.AUTO-MCNC: <2 MG/DL

## 2022-04-29 PROCEDURE — 99215 OFFICE O/P EST HI 40 MIN: CPT | Performed by: INTERNAL MEDICINE

## 2022-04-29 PROCEDURE — 73560 X-RAY EXAM OF KNEE 1 OR 2: CPT | Performed by: INTERNAL MEDICINE

## 2022-04-29 PROCEDURE — 3077F SYST BP >= 140 MM HG: CPT | Performed by: INTERNAL MEDICINE

## 2022-04-29 PROCEDURE — 81003 URINALYSIS AUTO W/O SCOPE: CPT

## 2022-04-29 PROCEDURE — 3008F BODY MASS INDEX DOCD: CPT | Performed by: INTERNAL MEDICINE

## 2022-04-29 PROCEDURE — 3080F DIAST BP >= 90 MM HG: CPT | Performed by: INTERNAL MEDICINE

## 2022-04-29 RX ORDER — LOSARTAN POTASSIUM 50 MG/1
50 TABLET ORAL DAILY
Qty: 90 TABLET | Refills: 1 | Status: SHIPPED | OUTPATIENT
Start: 2022-04-29

## 2022-04-29 RX ORDER — PANTOPRAZOLE SODIUM 20 MG/1
20 TABLET, DELAYED RELEASE ORAL
Qty: 30 TABLET | Refills: 1 | Status: SHIPPED | OUTPATIENT
Start: 2022-04-29

## 2022-04-29 RX ORDER — AZELASTINE 1 MG/ML
2 SPRAY, METERED NASAL DAILY
COMMUNITY
Start: 2021-11-02

## 2022-04-29 RX ORDER — AMLODIPINE BESYLATE 5 MG/1
TABLET ORAL
Qty: 180 TABLET | Refills: 1 | Status: SHIPPED | OUTPATIENT
Start: 2022-04-29

## 2022-05-02 RX ORDER — PANTOPRAZOLE SODIUM 20 MG/1
TABLET, DELAYED RELEASE ORAL
Qty: 90 TABLET | Refills: 0 | OUTPATIENT
Start: 2022-05-02

## 2022-05-14 ENCOUNTER — LAB ENCOUNTER (OUTPATIENT)
Dept: LAB | Age: 77
End: 2022-05-14
Attending: INTERNAL MEDICINE
Payer: MEDICARE

## 2022-05-14 DIAGNOSIS — E78.00 HYPERCHOLESTEROLEMIA: ICD-10-CM

## 2022-05-14 DIAGNOSIS — E03.9 HYPOTHYROIDISM, UNSPECIFIED TYPE: ICD-10-CM

## 2022-05-14 LAB
ALBUMIN SERPL-MCNC: 3.8 G/DL (ref 3.4–5)
ALBUMIN/GLOB SERPL: 1.1 {RATIO} (ref 1–2)
ALP LIVER SERPL-CCNC: 75 U/L
ALT SERPL-CCNC: 23 U/L
ANION GAP SERPL CALC-SCNC: 6 MMOL/L (ref 0–18)
AST SERPL-CCNC: 20 U/L (ref 15–37)
BILIRUB SERPL-MCNC: 0.6 MG/DL (ref 0.1–2)
BUN BLD-MCNC: 13 MG/DL (ref 7–18)
CALCIUM BLD-MCNC: 9.5 MG/DL (ref 8.5–10.1)
CHLORIDE SERPL-SCNC: 105 MMOL/L (ref 98–112)
CHOLEST SERPL-MCNC: 200 MG/DL (ref ?–200)
CO2 SERPL-SCNC: 29 MMOL/L (ref 21–32)
CREAT BLD-MCNC: 0.78 MG/DL
FASTING PATIENT LIPID ANSWER: YES
FASTING STATUS PATIENT QL REPORTED: YES
GLOBULIN PLAS-MCNC: 3.4 G/DL (ref 2.8–4.4)
GLUCOSE BLD-MCNC: 101 MG/DL (ref 70–99)
HDLC SERPL-MCNC: 93 MG/DL (ref 40–59)
LDLC SERPL CALC-MCNC: 95 MG/DL (ref ?–100)
NONHDLC SERPL-MCNC: 107 MG/DL (ref ?–130)
OSMOLALITY SERPL CALC.SUM OF ELEC: 290 MOSM/KG (ref 275–295)
POTASSIUM SERPL-SCNC: 4.5 MMOL/L (ref 3.5–5.1)
PROT SERPL-MCNC: 7.2 G/DL (ref 6.4–8.2)
SODIUM SERPL-SCNC: 140 MMOL/L (ref 136–145)
TRIGL SERPL-MCNC: 67 MG/DL (ref 30–149)
TSI SER-ACNC: 1.52 MIU/ML (ref 0.36–3.74)
VLDLC SERPL CALC-MCNC: 11 MG/DL (ref 0–30)

## 2022-05-14 PROCEDURE — 80061 LIPID PANEL: CPT

## 2022-05-14 PROCEDURE — 36415 COLL VENOUS BLD VENIPUNCTURE: CPT

## 2022-05-14 PROCEDURE — 84443 ASSAY THYROID STIM HORMONE: CPT

## 2022-05-14 PROCEDURE — 80053 COMPREHEN METABOLIC PANEL: CPT

## 2022-05-24 DIAGNOSIS — E78.00 HYPERCHOLESTEROLEMIA: ICD-10-CM

## 2022-05-24 RX ORDER — ATORVASTATIN CALCIUM 80 MG/1
TABLET, FILM COATED ORAL
Qty: 90 TABLET | Refills: 0 | Status: SHIPPED | OUTPATIENT
Start: 2022-05-24

## 2022-06-03 ENCOUNTER — OFFICE VISIT (OUTPATIENT)
Dept: INTERNAL MEDICINE CLINIC | Facility: CLINIC | Age: 77
End: 2022-06-03
Payer: MEDICARE

## 2022-06-03 VITALS
SYSTOLIC BLOOD PRESSURE: 142 MMHG | HEIGHT: 64.75 IN | HEART RATE: 68 BPM | RESPIRATION RATE: 12 BRPM | TEMPERATURE: 98 F | DIASTOLIC BLOOD PRESSURE: 86 MMHG | WEIGHT: 169.69 LBS | BODY MASS INDEX: 28.62 KG/M2

## 2022-06-03 DIAGNOSIS — M25.552 LEFT HIP PAIN: ICD-10-CM

## 2022-06-03 DIAGNOSIS — Z12.31 ENCOUNTER FOR SCREENING MAMMOGRAM FOR BREAST CANCER: ICD-10-CM

## 2022-06-03 DIAGNOSIS — G89.29 CHRONIC PAIN OF LEFT KNEE: ICD-10-CM

## 2022-06-03 DIAGNOSIS — Z85.43 HISTORY OF OVARIAN CANCER: ICD-10-CM

## 2022-06-03 DIAGNOSIS — I25.10 NON-OCCLUSIVE CORONARY ARTERY DISEASE: ICD-10-CM

## 2022-06-03 DIAGNOSIS — M54.16 LUMBAR RADICULOPATHY: ICD-10-CM

## 2022-06-03 DIAGNOSIS — R35.1 NOCTURIA: ICD-10-CM

## 2022-06-03 DIAGNOSIS — N39.46 MIXED STRESS AND URGE URINARY INCONTINENCE: ICD-10-CM

## 2022-06-03 DIAGNOSIS — Z00.00 ENCOUNTER FOR ANNUAL HEALTH EXAMINATION: Primary | ICD-10-CM

## 2022-06-03 DIAGNOSIS — R73.9 HYPERGLYCEMIA: ICD-10-CM

## 2022-06-03 DIAGNOSIS — E03.9 HYPOTHYROIDISM, UNSPECIFIED TYPE: ICD-10-CM

## 2022-06-03 DIAGNOSIS — M85.80 OSTEOPENIA, UNSPECIFIED LOCATION: ICD-10-CM

## 2022-06-03 DIAGNOSIS — R09.89 LABILE HYPERTENSION: ICD-10-CM

## 2022-06-03 DIAGNOSIS — M47.812 SPONDYLOSIS OF CERVICAL REGION WITHOUT MYELOPATHY OR RADICULOPATHY: ICD-10-CM

## 2022-06-03 DIAGNOSIS — M25.562 CHRONIC PAIN OF LEFT KNEE: ICD-10-CM

## 2022-06-03 DIAGNOSIS — E78.00 HYPERCHOLESTEROLEMIA: ICD-10-CM

## 2022-06-03 PROBLEM — M43.16 SPONDYLOLISTHESIS OF LUMBAR REGION: Status: RESOLVED | Noted: 2019-02-14 | Resolved: 2022-06-03

## 2022-06-03 PROCEDURE — 3008F BODY MASS INDEX DOCD: CPT | Performed by: INTERNAL MEDICINE

## 2022-06-03 PROCEDURE — G0439 PPPS, SUBSEQ VISIT: HCPCS | Performed by: INTERNAL MEDICINE

## 2022-06-03 PROCEDURE — 3079F DIAST BP 80-89 MM HG: CPT | Performed by: INTERNAL MEDICINE

## 2022-06-03 PROCEDURE — 99397 PER PM REEVAL EST PAT 65+ YR: CPT | Performed by: INTERNAL MEDICINE

## 2022-06-03 PROCEDURE — 3077F SYST BP >= 140 MM HG: CPT | Performed by: INTERNAL MEDICINE

## 2022-06-03 PROCEDURE — 96160 PT-FOCUSED HLTH RISK ASSMT: CPT | Performed by: INTERNAL MEDICINE

## 2022-06-21 ENCOUNTER — MED REC SCAN ONLY (OUTPATIENT)
Dept: INTERNAL MEDICINE CLINIC | Facility: CLINIC | Age: 77
End: 2022-06-21

## 2022-07-24 DIAGNOSIS — E03.9 HYPOTHYROIDISM, UNSPECIFIED TYPE: ICD-10-CM

## 2022-07-25 RX ORDER — LEVOTHYROXINE SODIUM 0.07 MG/1
TABLET ORAL
Qty: 90 TABLET | Refills: 1 | Status: SHIPPED | OUTPATIENT
Start: 2022-07-25

## 2022-08-10 ENCOUNTER — OFFICE VISIT (OUTPATIENT)
Dept: PODIATRY CLINIC | Facility: CLINIC | Age: 77
End: 2022-08-10
Payer: MEDICARE

## 2022-08-10 VITALS — SYSTOLIC BLOOD PRESSURE: 128 MMHG | DIASTOLIC BLOOD PRESSURE: 74 MMHG

## 2022-08-10 DIAGNOSIS — L84 FOOT CALLUS: ICD-10-CM

## 2022-08-10 DIAGNOSIS — M19.079 ARTHRITIS OF MIDFOOT: Primary | ICD-10-CM

## 2022-08-10 DIAGNOSIS — M79.671 RIGHT FOOT PAIN: ICD-10-CM

## 2022-08-10 DIAGNOSIS — M54.16 LUMBAR RADICULOPATHY: ICD-10-CM

## 2022-08-10 PROCEDURE — 20600 DRAIN/INJ JOINT/BURSA W/O US: CPT | Performed by: STUDENT IN AN ORGANIZED HEALTH CARE EDUCATION/TRAINING PROGRAM

## 2022-08-10 PROCEDURE — 1125F AMNT PAIN NOTED PAIN PRSNT: CPT | Performed by: STUDENT IN AN ORGANIZED HEALTH CARE EDUCATION/TRAINING PROGRAM

## 2022-08-10 PROCEDURE — 3074F SYST BP LT 130 MM HG: CPT | Performed by: STUDENT IN AN ORGANIZED HEALTH CARE EDUCATION/TRAINING PROGRAM

## 2022-08-10 PROCEDURE — 3078F DIAST BP <80 MM HG: CPT | Performed by: STUDENT IN AN ORGANIZED HEALTH CARE EDUCATION/TRAINING PROGRAM

## 2022-08-10 RX ORDER — DEXAMETHASONE SODIUM PHOSPHATE 4 MG/ML
2 VIAL (ML) INJECTION ONCE
Status: COMPLETED | OUTPATIENT
Start: 2022-08-10 | End: 2022-08-10

## 2022-08-10 NOTE — PROGRESS NOTES
Per Dr Leigh Marie  to draw up .5ml of dexamethasone sodium phosphate, .5ml kenalog-10 and 1ml marcaine 0.5% for a right foot injection.

## 2022-08-16 NOTE — PATIENT INSTRUCTIONS
-Monitor response to steroid injection.  -Ambulate with supportive shoes and inserts.  -Use urea cream to callus sites.

## 2022-08-22 DIAGNOSIS — E78.00 HYPERCHOLESTEROLEMIA: ICD-10-CM

## 2022-08-22 RX ORDER — ATORVASTATIN CALCIUM 80 MG/1
TABLET, FILM COATED ORAL
Qty: 90 TABLET | Refills: 1 | Status: SHIPPED | OUTPATIENT
Start: 2022-08-22

## 2022-08-24 ENCOUNTER — OFFICE VISIT (OUTPATIENT)
Dept: SURGERY | Facility: CLINIC | Age: 77
End: 2022-08-24
Payer: MEDICARE

## 2022-08-24 DIAGNOSIS — R35.1 NOCTURIA: Primary | ICD-10-CM

## 2022-08-24 DIAGNOSIS — N39.3 STRESS INCONTINENCE: ICD-10-CM

## 2022-08-24 DIAGNOSIS — R82.90 URINE FINDING: ICD-10-CM

## 2022-08-24 DIAGNOSIS — K58.2 IRRITABLE BOWEL SYNDROME WITH BOTH CONSTIPATION AND DIARRHEA: ICD-10-CM

## 2022-08-24 LAB
APPEARANCE: CLEAR
BILIRUB UR QL: NEGATIVE
BILIRUBIN: NEGATIVE
CLARITY UR: CLEAR
COLOR UR: YELLOW
GLUCOSE (URINE DIPSTICK): NEGATIVE MG/DL
GLUCOSE UR-MCNC: NEGATIVE MG/DL
HGB UR QL STRIP.AUTO: NEGATIVE
KETONES (URINE DIPSTICK): NEGATIVE MG/DL
KETONES UR-MCNC: NEGATIVE MG/DL
LEUKOCYTE ESTERASE UR QL STRIP.AUTO: NEGATIVE
LEUKOCYTES: NEGATIVE
MULTISTIX LOT#: ABNORMAL NUMERIC
NITRITE UR QL STRIP.AUTO: NEGATIVE
NITRITE, URINE: NEGATIVE
PH UR: 7 [PH] (ref 5–8)
PH, URINE: 7 (ref 4.5–8)
PROT UR-MCNC: NEGATIVE MG/DL
PROTEIN (URINE DIPSTICK): NEGATIVE MG/DL
SP GR UR STRIP: 1.01 (ref 1–1.03)
SPECIFIC GRAVITY: 1.01 (ref 1–1.03)
URINE-COLOR: YELLOW
UROBILINOGEN UR STRIP-ACNC: 0.2
UROBILINOGEN,SEMI-QN: 0.2 MG/DL (ref 0–1.9)

## 2022-08-24 PROCEDURE — 99203 OFFICE O/P NEW LOW 30 MIN: CPT | Performed by: PHYSICIAN ASSISTANT

## 2022-08-24 PROCEDURE — 51798 US URINE CAPACITY MEASURE: CPT | Performed by: SURGERY

## 2022-08-24 PROCEDURE — 81003 URINALYSIS AUTO W/O SCOPE: CPT | Performed by: PHYSICIAN ASSISTANT

## 2022-10-25 ENCOUNTER — OFFICE VISIT (OUTPATIENT)
Dept: UROLOGY | Facility: CLINIC | Age: 77
End: 2022-10-25
Attending: OBSTETRICS & GYNECOLOGY
Payer: MEDICARE

## 2022-10-25 VITALS — WEIGHT: 170 LBS | TEMPERATURE: 97 F | HEIGHT: 65 IN | BODY MASS INDEX: 28.32 KG/M2

## 2022-10-25 DIAGNOSIS — N81.84 PELVIC MUSCLE WASTING: ICD-10-CM

## 2022-10-25 DIAGNOSIS — R39.15 URINARY URGENCY: Primary | ICD-10-CM

## 2022-10-25 DIAGNOSIS — R35.0 URINARY FREQUENCY: ICD-10-CM

## 2022-10-25 DIAGNOSIS — R35.1 NOCTURIA: ICD-10-CM

## 2022-10-25 DIAGNOSIS — N36.2 URETHRAL CARUNCLE: ICD-10-CM

## 2022-10-25 DIAGNOSIS — N95.2 POSTMENOPAUSAL ATROPHIC VAGINITIS: ICD-10-CM

## 2022-10-25 LAB
BLOOD URINE: NEGATIVE
CONTROL RUN WITHIN 24 HOURS?: YES
NITRITE URINE: NEGATIVE

## 2022-10-25 PROCEDURE — 99212 OFFICE O/P EST SF 10 MIN: CPT

## 2022-10-25 PROCEDURE — 87086 URINE CULTURE/COLONY COUNT: CPT | Performed by: OBSTETRICS & GYNECOLOGY

## 2022-10-25 PROCEDURE — 87186 SC STD MICRODIL/AGAR DIL: CPT | Performed by: OBSTETRICS & GYNECOLOGY

## 2022-10-25 PROCEDURE — 87077 CULTURE AEROBIC IDENTIFY: CPT | Performed by: OBSTETRICS & GYNECOLOGY

## 2022-10-25 PROCEDURE — 81002 URINALYSIS NONAUTO W/O SCOPE: CPT | Performed by: OBSTETRICS & GYNECOLOGY

## 2022-10-28 ENCOUNTER — TELEPHONE (OUTPATIENT)
Dept: UROLOGY | Facility: CLINIC | Age: 77
End: 2022-10-28

## 2022-10-28 DIAGNOSIS — R09.89 LABILE HYPERTENSION: ICD-10-CM

## 2022-10-28 DIAGNOSIS — R09.89 CHRONIC THROAT CLEARING: ICD-10-CM

## 2022-10-28 RX ORDER — LOSARTAN POTASSIUM 50 MG/1
TABLET ORAL
Qty: 90 TABLET | Refills: 1 | Status: SHIPPED | OUTPATIENT
Start: 2022-10-28

## 2022-10-28 RX ORDER — NITROFURANTOIN 25; 75 MG/1; MG/1
100 CAPSULE ORAL 2 TIMES DAILY
Qty: 14 CAPSULE | Refills: 0 | Status: SHIPPED | OUTPATIENT
Start: 2022-10-28 | End: 2022-11-04

## 2022-11-13 DIAGNOSIS — R09.89 LABILE HYPERTENSION: ICD-10-CM

## 2022-11-14 RX ORDER — AMLODIPINE BESYLATE 5 MG/1
TABLET ORAL
Qty: 180 TABLET | Refills: 1 | Status: SHIPPED | OUTPATIENT
Start: 2022-11-14

## 2022-11-28 ENCOUNTER — OFFICE VISIT (OUTPATIENT)
Dept: UROLOGY | Facility: CLINIC | Age: 77
End: 2022-11-28
Attending: OBSTETRICS & GYNECOLOGY
Payer: MEDICARE

## 2022-11-28 VITALS — TEMPERATURE: 98 F | BODY MASS INDEX: 28.32 KG/M2 | HEIGHT: 65 IN | WEIGHT: 170 LBS

## 2022-11-28 DIAGNOSIS — N39.41 URGE URINARY INCONTINENCE: Primary | ICD-10-CM

## 2022-11-28 DIAGNOSIS — R35.1 NOCTURIA: ICD-10-CM

## 2022-11-28 DIAGNOSIS — N95.2 POSTMENOPAUSAL ATROPHIC VAGINITIS: ICD-10-CM

## 2022-11-28 DIAGNOSIS — N81.84 PELVIC MUSCLE WASTING: ICD-10-CM

## 2022-11-28 DIAGNOSIS — N36.2 URETHRAL CARUNCLE: ICD-10-CM

## 2022-11-28 DIAGNOSIS — N39.3 FEMALE STRESS INCONTINENCE: ICD-10-CM

## 2022-11-28 PROCEDURE — 99212 OFFICE O/P EST SF 10 MIN: CPT

## 2022-11-28 RX ORDER — ESTRADIOL 0.1 MG/G
CREAM VAGINAL
Qty: 42.5 G | Refills: 3 | Status: SHIPPED | OUTPATIENT
Start: 2022-11-28

## 2022-12-09 ENCOUNTER — OFFICE VISIT (OUTPATIENT)
Dept: INTERNAL MEDICINE CLINIC | Facility: CLINIC | Age: 77
End: 2022-12-09
Payer: MEDICARE

## 2022-12-09 VITALS
RESPIRATION RATE: 12 BRPM | HEIGHT: 65 IN | TEMPERATURE: 98 F | DIASTOLIC BLOOD PRESSURE: 82 MMHG | WEIGHT: 170.81 LBS | SYSTOLIC BLOOD PRESSURE: 136 MMHG | BODY MASS INDEX: 28.46 KG/M2 | HEART RATE: 76 BPM

## 2022-12-09 DIAGNOSIS — R09.89 LABILE HYPERTENSION: Primary | ICD-10-CM

## 2022-12-09 DIAGNOSIS — E78.00 HYPERCHOLESTEROLEMIA: ICD-10-CM

## 2022-12-09 DIAGNOSIS — R73.01 IMPAIRED FASTING GLUCOSE: ICD-10-CM

## 2022-12-09 DIAGNOSIS — E03.9 HYPOTHYROIDISM, UNSPECIFIED TYPE: ICD-10-CM

## 2022-12-09 PROCEDURE — 3079F DIAST BP 80-89 MM HG: CPT | Performed by: INTERNAL MEDICINE

## 2022-12-09 PROCEDURE — 3075F SYST BP GE 130 - 139MM HG: CPT | Performed by: INTERNAL MEDICINE

## 2022-12-09 PROCEDURE — 99214 OFFICE O/P EST MOD 30 MIN: CPT | Performed by: INTERNAL MEDICINE

## 2022-12-09 PROCEDURE — 3008F BODY MASS INDEX DOCD: CPT | Performed by: INTERNAL MEDICINE

## 2022-12-28 ENCOUNTER — LAB ENCOUNTER (OUTPATIENT)
Dept: LAB | Age: 77
End: 2022-12-28
Attending: INTERNAL MEDICINE
Payer: MEDICARE

## 2022-12-28 DIAGNOSIS — R19.5 CHANGE IN STOOL: ICD-10-CM

## 2022-12-28 DIAGNOSIS — E78.00 HYPERCHOLESTEROLEMIA: ICD-10-CM

## 2022-12-28 DIAGNOSIS — R73.01 IMPAIRED FASTING GLUCOSE: ICD-10-CM

## 2022-12-28 DIAGNOSIS — E03.9 HYPOTHYROIDISM, UNSPECIFIED TYPE: ICD-10-CM

## 2022-12-28 LAB
ALBUMIN SERPL-MCNC: 3.5 G/DL (ref 3.4–5)
ALBUMIN/GLOB SERPL: 0.9 {RATIO} (ref 1–2)
ALP LIVER SERPL-CCNC: 76 U/L
ALT SERPL-CCNC: 23 U/L
ANION GAP SERPL CALC-SCNC: 6 MMOL/L (ref 0–18)
AST SERPL-CCNC: 26 U/L (ref 15–37)
BASOPHILS # BLD AUTO: 0.05 X10(3) UL (ref 0–0.2)
BASOPHILS NFR BLD AUTO: 0.7 %
BILIRUB SERPL-MCNC: 0.5 MG/DL (ref 0.1–2)
BUN BLD-MCNC: 13 MG/DL (ref 7–18)
CALCIUM BLD-MCNC: 9.3 MG/DL (ref 8.5–10.1)
CHLORIDE SERPL-SCNC: 107 MMOL/L (ref 98–112)
CHOLEST SERPL-MCNC: 183 MG/DL (ref ?–200)
CO2 SERPL-SCNC: 28 MMOL/L (ref 21–32)
CREAT BLD-MCNC: 0.82 MG/DL
EOSINOPHIL # BLD AUTO: 0.23 X10(3) UL (ref 0–0.7)
EOSINOPHIL NFR BLD AUTO: 3 %
ERYTHROCYTE [DISTWIDTH] IN BLOOD BY AUTOMATED COUNT: 12.3 %
EST. AVERAGE GLUCOSE BLD GHB EST-MCNC: 117 MG/DL (ref 68–126)
FASTING PATIENT LIPID ANSWER: YES
FASTING STATUS PATIENT QL REPORTED: YES
GFR SERPLBLD BASED ON 1.73 SQ M-ARVRAT: 74 ML/MIN/1.73M2 (ref 60–?)
GLOBULIN PLAS-MCNC: 3.7 G/DL (ref 2.8–4.4)
GLUCOSE BLD-MCNC: 94 MG/DL (ref 70–99)
HBA1C MFR BLD: 5.7 % (ref ?–5.7)
HCT VFR BLD AUTO: 40.1 %
HDLC SERPL-MCNC: 91 MG/DL (ref 40–59)
HGB BLD-MCNC: 13.2 G/DL
IMM GRANULOCYTES # BLD AUTO: 0.02 X10(3) UL (ref 0–1)
IMM GRANULOCYTES NFR BLD: 0.3 %
LDLC SERPL CALC-MCNC: 82 MG/DL (ref ?–100)
LYMPHOCYTES # BLD AUTO: 3.41 X10(3) UL (ref 1–4)
LYMPHOCYTES NFR BLD AUTO: 44.6 %
MCH RBC QN AUTO: 30.7 PG (ref 26–34)
MCHC RBC AUTO-ENTMCNC: 32.9 G/DL (ref 31–37)
MCV RBC AUTO: 93.3 FL
MONOCYTES # BLD AUTO: 0.64 X10(3) UL (ref 0.1–1)
MONOCYTES NFR BLD AUTO: 8.4 %
NEUTROPHILS # BLD AUTO: 3.3 X10 (3) UL (ref 1.5–7.7)
NEUTROPHILS # BLD AUTO: 3.3 X10(3) UL (ref 1.5–7.7)
NEUTROPHILS NFR BLD AUTO: 43 %
NONHDLC SERPL-MCNC: 92 MG/DL (ref ?–130)
OSMOLALITY SERPL CALC.SUM OF ELEC: 292 MOSM/KG (ref 275–295)
PLATELET # BLD AUTO: 291 10(3)UL (ref 150–450)
POTASSIUM SERPL-SCNC: 3.7 MMOL/L (ref 3.5–5.1)
PROT SERPL-MCNC: 7.2 G/DL (ref 6.4–8.2)
RBC # BLD AUTO: 4.3 X10(6)UL
SODIUM SERPL-SCNC: 141 MMOL/L (ref 136–145)
TRIGL SERPL-MCNC: 50 MG/DL (ref 30–149)
TSI SER-ACNC: 3.47 MIU/ML (ref 0.36–3.74)
VLDLC SERPL CALC-MCNC: 8 MG/DL (ref 0–30)
WBC # BLD AUTO: 7.7 X10(3) UL (ref 4–11)

## 2022-12-28 PROCEDURE — 80061 LIPID PANEL: CPT

## 2022-12-28 PROCEDURE — 83036 HEMOGLOBIN GLYCOSYLATED A1C: CPT

## 2022-12-28 PROCEDURE — 84443 ASSAY THYROID STIM HORMONE: CPT

## 2022-12-28 PROCEDURE — 36415 COLL VENOUS BLD VENIPUNCTURE: CPT

## 2022-12-28 PROCEDURE — 85025 COMPLETE CBC W/AUTO DIFF WBC: CPT

## 2022-12-28 PROCEDURE — 80053 COMPREHEN METABOLIC PANEL: CPT

## 2022-12-28 NOTE — PROGRESS NOTES
CBC normal.  Continue work-up as outlined in office visit.   Follow-up if symptoms persist.  Please call with any questions,    Wilma Connolly MD

## 2023-01-26 ENCOUNTER — LAB ENCOUNTER (OUTPATIENT)
Dept: LAB | Age: 78
End: 2023-01-26
Attending: INTERNAL MEDICINE
Payer: MEDICARE

## 2023-01-26 DIAGNOSIS — K21.9 GASTROESOPHAGEAL REFLUX DISEASE, UNSPECIFIED WHETHER ESOPHAGITIS PRESENT: ICD-10-CM

## 2023-01-26 PROCEDURE — 87338 HPYLORI STOOL AG IA: CPT

## 2023-01-27 LAB — H PYLORI AG STL QL IA: NEGATIVE

## 2023-01-30 ENCOUNTER — HOSPITAL ENCOUNTER (OUTPATIENT)
Dept: MAMMOGRAPHY | Age: 78
Discharge: HOME | End: 2023-01-30
Attending: INTERNAL MEDICINE
Payer: MEDICARE

## 2023-01-30 DIAGNOSIS — Z12.31 ENCOUNTER FOR SCREENING MAMMOGRAM FOR BREAST CANCER: ICD-10-CM

## 2023-01-30 PROCEDURE — 77063 BREAST TOMOSYNTHESIS BI: CPT | Performed by: INTERNAL MEDICINE

## 2023-01-30 PROCEDURE — 77067 SCR MAMMO BI INCL CAD: CPT | Performed by: INTERNAL MEDICINE

## 2023-02-06 DIAGNOSIS — E03.9 HYPOTHYROIDISM, UNSPECIFIED TYPE: ICD-10-CM

## 2023-02-06 DIAGNOSIS — E78.00 HYPERCHOLESTEROLEMIA: ICD-10-CM

## 2023-02-07 ENCOUNTER — TELEPHONE (OUTPATIENT)
Dept: INTERNAL MEDICINE CLINIC | Facility: CLINIC | Age: 78
End: 2023-02-07

## 2023-02-08 RX ORDER — ATORVASTATIN CALCIUM 80 MG/1
80 TABLET, FILM COATED ORAL NIGHTLY
Qty: 90 TABLET | Refills: 1 | Status: SHIPPED | OUTPATIENT
Start: 2023-02-08

## 2023-02-08 RX ORDER — LEVOTHYROXINE SODIUM 0.07 MG/1
75 TABLET ORAL
Qty: 90 TABLET | Refills: 3 | Status: SHIPPED | OUTPATIENT
Start: 2023-02-08

## 2023-04-25 DIAGNOSIS — R09.89 LABILE HYPERTENSION: ICD-10-CM

## 2023-04-25 DIAGNOSIS — R09.89 CHRONIC THROAT CLEARING: ICD-10-CM

## 2023-04-25 RX ORDER — LOSARTAN POTASSIUM 50 MG/1
50 TABLET ORAL DAILY
Qty: 90 TABLET | Refills: 0 | Status: SHIPPED | OUTPATIENT
Start: 2023-04-25

## 2023-04-25 NOTE — TELEPHONE ENCOUNTER
Hypertension Medications Protocol Passed 04/25/2023 10:53 AM   Protocol Details  CMP or BMP in past 12 months    Last serum creatinine< 2.0    Appointment in past 6 or next 3 months      1. Labile hypertension  Continue meds. Future Appointments   Date Time Provider Chacha Hernandez   6/7/2023 10:20 AM Karen Puente MD [facility] [place]   Refilled per protocol.

## 2023-05-11 DIAGNOSIS — R09.89 LABILE HYPERTENSION: ICD-10-CM

## 2023-05-11 RX ORDER — AMLODIPINE BESYLATE 5 MG/1
TABLET ORAL
Qty: 180 TABLET | Refills: 0 | Status: SHIPPED | OUTPATIENT
Start: 2023-05-11

## 2023-05-11 NOTE — TELEPHONE ENCOUNTER
Hypertension Medications Protocol Passed 05/11/2023 10:56 AM   Protocol Details  CMP or BMP in past 12 months    Last serum creatinine< 2.0    Appointment in past 6 or next 3 months   1. Labile hypertension  Continue meds.    Future Appointments   Date Time Provider Chacha Hernandez   6/7/2023 10:20 AM Katlyn Small MD [facility]

## 2023-05-24 ENCOUNTER — TELEPHONE (OUTPATIENT)
Dept: INTERNAL MEDICINE CLINIC | Facility: CLINIC | Age: 78
End: 2023-05-24

## 2023-05-24 NOTE — TELEPHONE ENCOUNTER
Pt has an uncomfortable lump near rectum, no bleeding or drainage. Not sure when it started. Pt accepts an OV for tomorrow.   Future Appointments   Date Time Provider Chacha Mary   5/25/2023  1:20 PM RAYNA Ty [facility] [place]   6/7/2023 10:20 AM Leticia Dougherty MD [facility] [place]

## 2023-05-24 NOTE — TELEPHONE ENCOUNTER
Pt called and has a growth near rectum.  Pt would like a recommendation to see specialist for this issue

## 2023-05-25 ENCOUNTER — OFFICE VISIT (OUTPATIENT)
Dept: INTERNAL MEDICINE CLINIC | Facility: CLINIC | Age: 78
End: 2023-05-25
Payer: MEDICARE

## 2023-05-25 VITALS
SYSTOLIC BLOOD PRESSURE: 120 MMHG | HEART RATE: 74 BPM | WEIGHT: 170.63 LBS | BODY MASS INDEX: 28.43 KG/M2 | OXYGEN SATURATION: 98 % | RESPIRATION RATE: 14 BRPM | TEMPERATURE: 98 F | HEIGHT: 65 IN | DIASTOLIC BLOOD PRESSURE: 72 MMHG

## 2023-05-25 DIAGNOSIS — E03.9 HYPOTHYROIDISM, UNSPECIFIED TYPE: ICD-10-CM

## 2023-05-25 DIAGNOSIS — E78.00 HYPERCHOLESTEROLEMIA: ICD-10-CM

## 2023-05-25 DIAGNOSIS — K64.4 EXTERNAL HEMORRHOID: Primary | ICD-10-CM

## 2023-05-25 DIAGNOSIS — R73.9 HYPERGLYCEMIA: ICD-10-CM

## 2023-05-25 PROCEDURE — 3008F BODY MASS INDEX DOCD: CPT | Performed by: NURSE PRACTITIONER

## 2023-05-25 PROCEDURE — 1160F RVW MEDS BY RX/DR IN RCRD: CPT | Performed by: NURSE PRACTITIONER

## 2023-05-25 PROCEDURE — 1170F FXNL STATUS ASSESSED: CPT | Performed by: NURSE PRACTITIONER

## 2023-05-25 PROCEDURE — 1159F MED LIST DOCD IN RCRD: CPT | Performed by: NURSE PRACTITIONER

## 2023-05-25 PROCEDURE — 99214 OFFICE O/P EST MOD 30 MIN: CPT | Performed by: NURSE PRACTITIONER

## 2023-05-25 PROCEDURE — 3074F SYST BP LT 130 MM HG: CPT | Performed by: NURSE PRACTITIONER

## 2023-05-25 PROCEDURE — 3078F DIAST BP <80 MM HG: CPT | Performed by: NURSE PRACTITIONER

## 2023-05-25 RX ORDER — HYDROCORTISONE 25 MG/G
1 CREAM TOPICAL 2 TIMES DAILY
Qty: 28 G | Refills: 1 | Status: SHIPPED | OUTPATIENT
Start: 2023-05-25

## 2023-05-25 NOTE — PATIENT INSTRUCTIONS
Apply the anusol hc cream twice daily. Do warm sitz baths twice daily. Drink at least 64-80 oz of fluids. Take Miralax as needed for constipation. Get your labs done at least 2-3 days prior to your annual supervisit. You should be fasting for at least 10 hours. If you take a multivitamin with Biotin or any biotin product it should be held for 3 days prior to getting your labs done.  If you use [facility] labs, you may schedule at [phone] or on-line at [facility].

## 2023-05-31 ENCOUNTER — LAB ENCOUNTER (OUTPATIENT)
Dept: LAB | Age: 78
End: 2023-05-31
Attending: NURSE PRACTITIONER
Payer: MEDICARE

## 2023-05-31 DIAGNOSIS — E03.9 HYPOTHYROIDISM, UNSPECIFIED TYPE: ICD-10-CM

## 2023-05-31 DIAGNOSIS — R73.9 HYPERGLYCEMIA: ICD-10-CM

## 2023-05-31 DIAGNOSIS — E78.00 HYPERCHOLESTEROLEMIA: ICD-10-CM

## 2023-05-31 LAB
ALBUMIN SERPL-MCNC: 3.6 G/DL (ref 3.4–5)
ALBUMIN/GLOB SERPL: 1 {RATIO} (ref 1–2)
ALP LIVER SERPL-CCNC: 78 U/L
ALT SERPL-CCNC: 25 U/L
ANION GAP SERPL CALC-SCNC: 2 MMOL/L (ref 0–18)
AST SERPL-CCNC: 26 U/L (ref 15–37)
BILIRUB SERPL-MCNC: 0.5 MG/DL (ref 0.1–2)
BUN BLD-MCNC: 13 MG/DL (ref 7–18)
CALCIUM BLD-MCNC: 9.1 MG/DL (ref 8.5–10.1)
CHLORIDE SERPL-SCNC: 105 MMOL/L (ref 98–112)
CHOLEST SERPL-MCNC: 177 MG/DL (ref ?–200)
CO2 SERPL-SCNC: 28 MMOL/L (ref 21–32)
CREAT BLD-MCNC: 0.88 MG/DL
EST. AVERAGE GLUCOSE BLD GHB EST-MCNC: 120 MG/DL (ref 68–126)
FASTING PATIENT LIPID ANSWER: YES
FASTING STATUS PATIENT QL REPORTED: YES
GFR SERPLBLD BASED ON 1.73 SQ M-ARVRAT: 68 ML/MIN/1.73M2 (ref 60–?)
GLOBULIN PLAS-MCNC: 3.7 G/DL (ref 2.8–4.4)
GLUCOSE BLD-MCNC: 90 MG/DL (ref 70–99)
HBA1C MFR BLD: 5.8 % (ref ?–5.7)
HDLC SERPL-MCNC: 88 MG/DL (ref 40–59)
LDLC SERPL CALC-MCNC: 79 MG/DL (ref ?–100)
NONHDLC SERPL-MCNC: 89 MG/DL (ref ?–130)
OSMOLALITY SERPL CALC.SUM OF ELEC: 280 MOSM/KG (ref 275–295)
POTASSIUM SERPL-SCNC: 3.8 MMOL/L (ref 3.5–5.1)
PROT SERPL-MCNC: 7.3 G/DL (ref 6.4–8.2)
SODIUM SERPL-SCNC: 135 MMOL/L (ref 136–145)
TRIGL SERPL-MCNC: 52 MG/DL (ref 30–149)
TSI SER-ACNC: 2.1 MIU/ML (ref 0.36–3.74)
VLDLC SERPL CALC-MCNC: 8 MG/DL (ref 0–30)

## 2023-05-31 PROCEDURE — 83036 HEMOGLOBIN GLYCOSYLATED A1C: CPT

## 2023-05-31 PROCEDURE — 84443 ASSAY THYROID STIM HORMONE: CPT

## 2023-05-31 PROCEDURE — 36415 COLL VENOUS BLD VENIPUNCTURE: CPT

## 2023-05-31 PROCEDURE — 80053 COMPREHEN METABOLIC PANEL: CPT

## 2023-05-31 PROCEDURE — 80061 LIPID PANEL: CPT

## 2023-06-07 ENCOUNTER — OFFICE VISIT (OUTPATIENT)
Dept: INTERNAL MEDICINE CLINIC | Facility: CLINIC | Age: 78
End: 2023-06-07
Payer: MEDICARE

## 2023-06-07 VITALS
SYSTOLIC BLOOD PRESSURE: 110 MMHG | WEIGHT: 169 LBS | HEIGHT: 64.5 IN | RESPIRATION RATE: 16 BRPM | BODY MASS INDEX: 28.5 KG/M2 | OXYGEN SATURATION: 98 % | DIASTOLIC BLOOD PRESSURE: 64 MMHG | HEART RATE: 74 BPM

## 2023-06-07 DIAGNOSIS — G89.29 CHRONIC PAIN OF LEFT KNEE: ICD-10-CM

## 2023-06-07 DIAGNOSIS — R09.89 LABILE HYPERTENSION: ICD-10-CM

## 2023-06-07 DIAGNOSIS — Z12.31 ENCOUNTER FOR SCREENING MAMMOGRAM FOR MALIGNANT NEOPLASM OF BREAST: Primary | ICD-10-CM

## 2023-06-07 DIAGNOSIS — M25.562 CHRONIC PAIN OF LEFT KNEE: ICD-10-CM

## 2023-06-07 DIAGNOSIS — I25.10 NON-OCCLUSIVE CORONARY ARTERY DISEASE: ICD-10-CM

## 2023-06-07 DIAGNOSIS — Z85.44 HISTORY OF CANCER OF FALLOPIAN TUBE IN ADULTHOOD: ICD-10-CM

## 2023-06-07 DIAGNOSIS — N39.46 MIXED STRESS AND URGE URINARY INCONTINENCE: ICD-10-CM

## 2023-06-07 DIAGNOSIS — M25.552 LEFT HIP PAIN: ICD-10-CM

## 2023-06-07 DIAGNOSIS — Z85.43 HISTORY OF OVARIAN CANCER: ICD-10-CM

## 2023-06-07 DIAGNOSIS — R73.03 PRE-DIABETES: ICD-10-CM

## 2023-06-07 DIAGNOSIS — E03.9 HYPOTHYROIDISM, UNSPECIFIED TYPE: ICD-10-CM

## 2023-06-07 DIAGNOSIS — Z00.00 ENCOUNTER FOR ANNUAL HEALTH EXAMINATION: ICD-10-CM

## 2023-06-07 DIAGNOSIS — E78.00 HYPERCHOLESTEROLEMIA: ICD-10-CM

## 2023-06-07 DIAGNOSIS — M54.16 LUMBAR RADICULOPATHY: ICD-10-CM

## 2023-06-07 DIAGNOSIS — M47.812 SPONDYLOSIS OF CERVICAL REGION WITHOUT MYELOPATHY OR RADICULOPATHY: ICD-10-CM

## 2023-06-07 DIAGNOSIS — J30.89 NON-SEASONAL ALLERGIC RHINITIS, UNSPECIFIED TRIGGER: ICD-10-CM

## 2023-06-07 PROCEDURE — 1125F AMNT PAIN NOTED PAIN PRSNT: CPT | Performed by: INTERNAL MEDICINE

## 2023-06-07 PROCEDURE — 1159F MED LIST DOCD IN RCRD: CPT | Performed by: INTERNAL MEDICINE

## 2023-06-07 PROCEDURE — 1170F FXNL STATUS ASSESSED: CPT | Performed by: INTERNAL MEDICINE

## 2023-06-07 PROCEDURE — 96160 PT-FOCUSED HLTH RISK ASSMT: CPT | Performed by: INTERNAL MEDICINE

## 2023-06-07 PROCEDURE — 3008F BODY MASS INDEX DOCD: CPT | Performed by: INTERNAL MEDICINE

## 2023-06-07 PROCEDURE — G0439 PPPS, SUBSEQ VISIT: HCPCS | Performed by: INTERNAL MEDICINE

## 2023-06-07 PROCEDURE — 3074F SYST BP LT 130 MM HG: CPT | Performed by: INTERNAL MEDICINE

## 2023-06-07 PROCEDURE — 99214 OFFICE O/P EST MOD 30 MIN: CPT | Performed by: INTERNAL MEDICINE

## 2023-06-07 PROCEDURE — 3078F DIAST BP <80 MM HG: CPT | Performed by: INTERNAL MEDICINE

## 2023-08-02 DIAGNOSIS — E78.00 HYPERCHOLESTEROLEMIA: ICD-10-CM

## 2023-08-02 RX ORDER — ATORVASTATIN CALCIUM 80 MG/1
80 TABLET, FILM COATED ORAL NIGHTLY
Qty: 90 TABLET | Refills: 1 | Status: SHIPPED | OUTPATIENT
Start: 2023-08-02

## 2023-08-02 NOTE — TELEPHONE ENCOUNTER
Cholesterol Medication Protocol [id number]/02/2023 10:34 AM   Protocol Details ALT < 80    ALT resulted within past year    Lipid panel within past 12 months    Appointment within past 12 or next 3 months   3. Hypercholesterolemia  Continue statin. Future Appointments   Date Time Provider Chacha Hernandez   12/8/2023 10:40 AM Elisabeth Perez MD [facility] [place]   Refilled per protocol.

## 2023-08-07 DIAGNOSIS — R09.89 LABILE HYPERTENSION: ICD-10-CM

## 2023-08-07 DIAGNOSIS — R09.89 CHRONIC THROAT CLEARING: ICD-10-CM

## 2023-08-07 RX ORDER — AMLODIPINE BESYLATE 5 MG/1
TABLET ORAL
Qty: 180 TABLET | Refills: 1 | Status: SHIPPED | OUTPATIENT
Start: 2023-08-07

## 2023-08-07 RX ORDER — LOSARTAN POTASSIUM 50 MG/1
50 TABLET ORAL DAILY
Qty: 90 TABLET | Refills: 1 | Status: SHIPPED | OUTPATIENT
Start: 2023-08-07

## 2023-08-07 NOTE — TELEPHONE ENCOUNTER
Is this medication prescribed by the [facility] Providers? yes    Did the patient contact the pharmacy directly?:  yes - pharmacy said office is not responding    Is patient out of meds or supply very low?:  low (5 days left)    Medication and Dose Requested:  amLODIPine 5 MG Oral Tab                                                             losartan 50 MG Oral Tab                                                        Is patient requesting a 30 or 90 day supply?:  90    Pharmacy name and phone # or location:  [facility] [address],  [address] [address] [place], [phone], [phone]     Is the patient due for an appointment?: no -  physical on 6/7/23 (if so, please schedule appt)    Additional Notes:  none    Please advise the patient refills take up to 72 business hours.

## 2023-08-07 NOTE — TELEPHONE ENCOUNTER
Hypertension Medications Protocol [id number]/07/2023 10:33 AM   Protocol Details CMP or BMP in past 12 months    Last serum creatinine< 2.0    Appointment in past 6 or next 3 months          Future Appointments   Date Time Provider Chacha Hernandez   12/8/2023 10:40 AM Dina Ga MD [facility] [place]

## 2023-09-13 ENCOUNTER — NURSE ONLY (OUTPATIENT)
Dept: HEMATOLOGY/ONCOLOGY | Facility: HOSPITAL | Age: 78
End: 2023-09-13
Attending: OBSTETRICS & GYNECOLOGY
Payer: MEDICARE

## 2023-09-13 DIAGNOSIS — C54.1 ENDOMETRIAL SARCOMA (HCC): Primary | ICD-10-CM

## 2023-09-13 LAB — CANCER AG125 SERPL-ACNC: <1.5 U/ML (ref ?–35)

## 2023-09-13 PROCEDURE — 86304 IMMUNOASSAY TUMOR CA 125: CPT

## 2023-09-13 PROCEDURE — 36415 COLL VENOUS BLD VENIPUNCTURE: CPT

## 2023-09-19 ENCOUNTER — PATIENT MESSAGE (OUTPATIENT)
Dept: INTERNAL MEDICINE CLINIC | Facility: CLINIC | Age: 78
End: 2023-09-19

## 2023-09-19 DIAGNOSIS — L98.9 SKIN ABNORMALITY: Primary | ICD-10-CM

## 2023-09-19 DIAGNOSIS — Z12.83 SKIN CANCER SCREENING: ICD-10-CM

## 2023-09-19 NOTE — TELEPHONE ENCOUNTER
From: Yi Milan  To: Jordyn Nelson  Sent: 9/19/2023 9:35 AM CDT  Subject: Dermatologist     I want to have a new growth on my face examined. Can you recommend someone please.

## 2023-12-05 ENCOUNTER — LAB ENCOUNTER (OUTPATIENT)
Dept: LAB | Age: 78
End: 2023-12-05
Attending: INTERNAL MEDICINE
Payer: MEDICARE

## 2023-12-05 DIAGNOSIS — R09.89 LABILE HYPERTENSION: ICD-10-CM

## 2023-12-05 DIAGNOSIS — R73.03 PRE-DIABETES: ICD-10-CM

## 2023-12-05 DIAGNOSIS — E03.9 HYPOTHYROIDISM, UNSPECIFIED TYPE: ICD-10-CM

## 2023-12-05 DIAGNOSIS — E78.00 HYPERCHOLESTEROLEMIA: ICD-10-CM

## 2023-12-05 LAB
ALBUMIN SERPL-MCNC: 3.7 G/DL (ref 3.4–5)
ALBUMIN/GLOB SERPL: 1 {RATIO} (ref 1–2)
ALP LIVER SERPL-CCNC: 82 U/L
ALT SERPL-CCNC: 26 U/L
ANION GAP SERPL CALC-SCNC: 4 MMOL/L (ref 0–18)
AST SERPL-CCNC: 24 U/L (ref 15–37)
BILIRUB SERPL-MCNC: 0.6 MG/DL (ref 0.1–2)
BUN BLD-MCNC: 13 MG/DL (ref 9–23)
CALCIUM BLD-MCNC: 9.3 MG/DL (ref 8.5–10.1)
CHLORIDE SERPL-SCNC: 106 MMOL/L (ref 98–112)
CHOLEST SERPL-MCNC: 169 MG/DL (ref ?–200)
CO2 SERPL-SCNC: 30 MMOL/L (ref 21–32)
CREAT BLD-MCNC: 0.88 MG/DL
EGFRCR SERPLBLD CKD-EPI 2021: 68 ML/MIN/1.73M2 (ref 60–?)
EST. AVERAGE GLUCOSE BLD GHB EST-MCNC: 126 MG/DL (ref 68–126)
FASTING PATIENT LIPID ANSWER: YES
FASTING STATUS PATIENT QL REPORTED: YES
GLOBULIN PLAS-MCNC: 3.8 G/DL (ref 2.8–4.4)
GLUCOSE BLD-MCNC: 95 MG/DL (ref 70–99)
HBA1C MFR BLD: 6 % (ref ?–5.7)
HDLC SERPL-MCNC: 86 MG/DL (ref 40–59)
LDLC SERPL CALC-MCNC: 70 MG/DL (ref ?–100)
NONHDLC SERPL-MCNC: 83 MG/DL (ref ?–130)
OSMOLALITY SERPL CALC.SUM OF ELEC: 290 MOSM/KG (ref 275–295)
POTASSIUM SERPL-SCNC: 3.5 MMOL/L (ref 3.5–5.1)
PROT SERPL-MCNC: 7.5 G/DL (ref 6.4–8.2)
SODIUM SERPL-SCNC: 140 MMOL/L (ref 136–145)
TRIGL SERPL-MCNC: 66 MG/DL (ref 30–149)
TSI SER-ACNC: 1.32 MIU/ML (ref 0.36–3.74)
VLDLC SERPL CALC-MCNC: 10 MG/DL (ref 0–30)

## 2023-12-05 PROCEDURE — 83036 HEMOGLOBIN GLYCOSYLATED A1C: CPT

## 2023-12-05 PROCEDURE — 36415 COLL VENOUS BLD VENIPUNCTURE: CPT

## 2023-12-05 PROCEDURE — 80053 COMPREHEN METABOLIC PANEL: CPT

## 2023-12-05 PROCEDURE — 84443 ASSAY THYROID STIM HORMONE: CPT

## 2023-12-05 PROCEDURE — 80061 LIPID PANEL: CPT

## 2023-12-08 ENCOUNTER — OFFICE VISIT (OUTPATIENT)
Dept: INTERNAL MEDICINE CLINIC | Facility: CLINIC | Age: 78
End: 2023-12-08
Payer: MEDICARE

## 2023-12-08 VITALS
SYSTOLIC BLOOD PRESSURE: 136 MMHG | RESPIRATION RATE: 12 BRPM | HEART RATE: 76 BPM | BODY MASS INDEX: 28.64 KG/M2 | HEIGHT: 64.5 IN | DIASTOLIC BLOOD PRESSURE: 78 MMHG | TEMPERATURE: 98 F | WEIGHT: 169.81 LBS

## 2023-12-08 DIAGNOSIS — E78.00 HYPERCHOLESTEROLEMIA: ICD-10-CM

## 2023-12-08 DIAGNOSIS — E03.9 HYPOTHYROIDISM, UNSPECIFIED TYPE: ICD-10-CM

## 2023-12-08 DIAGNOSIS — R09.89 LABILE HYPERTENSION: ICD-10-CM

## 2023-12-08 DIAGNOSIS — R73.03 PRE-DIABETES: ICD-10-CM

## 2023-12-08 DIAGNOSIS — Z23 NEED FOR VACCINATION: ICD-10-CM

## 2023-12-08 DIAGNOSIS — N39.46 MIXED STRESS AND URGE URINARY INCONTINENCE: ICD-10-CM

## 2023-12-08 DIAGNOSIS — R19.4 INCREASED BOWEL FREQUENCY: ICD-10-CM

## 2024-01-13 DIAGNOSIS — R09.89 LABILE HYPERTENSION: ICD-10-CM

## 2024-01-13 DIAGNOSIS — E03.9 HYPOTHYROIDISM, UNSPECIFIED TYPE: ICD-10-CM

## 2024-01-13 DIAGNOSIS — E78.00 HYPERCHOLESTEROLEMIA: ICD-10-CM

## 2024-01-13 DIAGNOSIS — R09.89 CHRONIC THROAT CLEARING: ICD-10-CM

## 2024-01-17 ENCOUNTER — OFFICE VISIT (OUTPATIENT)
Dept: INTERNAL MEDICINE CLINIC | Facility: CLINIC | Age: 79
End: 2024-01-17
Payer: MEDICARE

## 2024-01-17 VITALS
TEMPERATURE: 97 F | SYSTOLIC BLOOD PRESSURE: 128 MMHG | BODY MASS INDEX: 28.45 KG/M2 | HEIGHT: 64.5 IN | HEART RATE: 72 BPM | WEIGHT: 168.69 LBS | OXYGEN SATURATION: 96 % | DIASTOLIC BLOOD PRESSURE: 70 MMHG

## 2024-01-17 DIAGNOSIS — R42 VERTIGO: Primary | ICD-10-CM

## 2024-01-17 PROCEDURE — 1159F MED LIST DOCD IN RCRD: CPT | Performed by: INTERNAL MEDICINE

## 2024-01-17 PROCEDURE — 3008F BODY MASS INDEX DOCD: CPT | Performed by: INTERNAL MEDICINE

## 2024-01-17 PROCEDURE — 3078F DIAST BP <80 MM HG: CPT | Performed by: INTERNAL MEDICINE

## 2024-01-17 PROCEDURE — 99213 OFFICE O/P EST LOW 20 MIN: CPT | Performed by: INTERNAL MEDICINE

## 2024-01-17 PROCEDURE — 3074F SYST BP LT 130 MM HG: CPT | Performed by: INTERNAL MEDICINE

## 2024-01-17 PROCEDURE — 1170F FXNL STATUS ASSESSED: CPT | Performed by: INTERNAL MEDICINE

## 2024-01-17 RX ORDER — ATORVASTATIN CALCIUM 80 MG/1
80 TABLET, FILM COATED ORAL NIGHTLY
Qty: 90 TABLET | Refills: 1 | Status: SHIPPED | OUTPATIENT
Start: 2024-01-17

## 2024-01-17 RX ORDER — MECLIZINE HCL 12.5 MG/1
12.5 TABLET ORAL 2 TIMES DAILY PRN
Qty: 20 TABLET | Refills: 0 | Status: SHIPPED | OUTPATIENT
Start: 2024-01-17

## 2024-01-17 RX ORDER — LOSARTAN POTASSIUM 50 MG/1
50 TABLET ORAL DAILY
Qty: 90 TABLET | Refills: 1 | Status: SHIPPED | OUTPATIENT
Start: 2024-01-17

## 2024-01-17 RX ORDER — LEVOTHYROXINE SODIUM 0.07 MG/1
75 TABLET ORAL
Qty: 90 TABLET | Refills: 3 | Status: SHIPPED | OUTPATIENT
Start: 2024-01-17

## 2024-01-17 NOTE — PROGRESS NOTES
[facility]    CHIEF COMPLAINT:    Chief Complaint   Patient presents with    Dizziness     2-3 weeks mainly in bed turning around feels off , feels like she is going to fall no room spinning no objects moving when getting her boots off didn't feel dizzy           HISTORY OF PRESENT ILLNESS:  Complains of dizziness for the past 2-3 weeks.   Had one bad episode when she laid in bed and as soon as her head hit the pillow she felt really dizzy like everything was spinning. Lasted 30-60 seconds and improved after that.   After that has had a few episodes here and there when she gets dizzy with head movements, not as bad as that first time.   No headache, no vision changes, no numbness, no tingling, no weakness, no speech difficulty. Once in a while when she walks she feels she is veering off to one side but not regularly.   No ear pain, no change in her allergies.     REVIEW OF SYSTEMS:  See HPI    Current Medications:    Current Outpatient Medications   Medication Sig Dispense Refill    meclizine 12.5 MG Oral Tab Take 1 tablet (12.5 mg total) by mouth 2 (two) times daily as needed. 20 tablet 0    amLODIPine 5 MG Oral Tab TAKE 1 TABLET BY MOUTH IN THE MORNING AND 1 TABLET AT NIGHT 180 tablet 1    losartan 50 MG Oral Tab Take 1 tablet (50 mg total) by mouth daily. 90 tablet 1    atorvastatin 80 MG Oral Tab Take 1 tablet (80 mg total) by mouth nightly. 90 tablet 1    hydrocortisone 2.5 % External Cream Place 1 Application rectally 2 (two) times daily. 28 g 1    levothyroxine 75 MCG Oral Tab Take 1 tablet (75 mcg total) by mouth before breakfast. 90 tablet 3    azelastine 0.1 % Nasal Solution 2 sprays by Nasal route daily as needed.      acetaminophen 500 MG Oral Tab Take 1 tablet (500 mg total) by mouth every 6 (six) hours as needed for Pain. As needed      Cholecalciferol (VITAMIN D OR) Take 1,000 Units by mouth every morning.           PAST MEDICAL, SOCIAL, AND FAMILY HISTORY:  Tobacco:    History   Smoking  Status    Never   Smokeless Tobacco    Never       PHYSICAL EXAM:  /70 (BP Location: Left arm, Patient Position: Sitting, Cuff Size: adult)   Pulse 72   Temp 96.8 °F (36 °C) (Temporal)   Ht 5' 4.5\" (1.638 m)   Wt 168 lb 11.2 oz (76.5 kg)   SpO2 96%   BMI 28.51 kg/m²    GENERAL: well developed, well nourished,in no apparent distress  HEENT: TMs normal, some serous fluid behind left tm.   EYES:perrl, eomi  NECK: no lad, no thyroid enlargement.   LUNGS: clear to auscultation  CARDIO: RRR without murmur  MUSCULOSKELETAL: back is not tender,FROM of the back  EXTREMITIES: no cyanosis, clubbing or edema  NEURO: Oriented times three, cranial nerves are intact, motor and sensory are grossly intact, DTR's are 2+, strength is 5+/5 and sensation is intact to pinprick, and finger to nose, heel to shin and Rhomberg are all wnl's  [place] hallpike maneuver positive on the left.     DATA:  Results for orders placed or performed in visit on 12/05/23   Assay, Thyroid Stim Hormone   Result Value Ref Range    TSH 1.320 0.358 - 3.740 mIU/mL   Comp Metabolic Panel (14)   Result Value Ref Range    Glucose 95 70 - 99 mg/dL    Sodium 140 136 - 145 mmol/L    Potassium 3.5 3.5 - 5.1 mmol/L    Chloride 106 98 - 112 mmol/L    CO2 30.0 21.0 - 32.0 mmol/L    Anion Gap 4 0 - 18 mmol/L    BUN 13 9 - 23 mg/dL    Creatinine 0.88 0.55 - 1.02 mg/dL    Calcium, Total 9.3 8.5 - 10.1 mg/dL    Calculated Osmolality 290 275 - 295 mOsm/kg    eGFR-Cr 68 >=60 mL/min/1.73m2    AST 24 15 - 37 U/L    ALT 26 13 - 56 U/L    Alkaline Phosphatase 82 55 - 142 U/L    Bilirubin, Total 0.6 0.1 - 2.0 mg/dL    Total Protein 7.5 6.4 - 8.2 g/dL    Albumin 3.7 3.4 - 5.0 g/dL    Globulin  3.8 2.8 - 4.4 g/dL    A/G Ratio 1.0 1.0 - 2.0    Patient Fasting for CMP? Yes    Hemoglobin A1C   Result Value Ref Range    HgbA1C 6.0 (H) <5.7 %    Estimated Average Glucose 126 68 - 126 mg/dL   Lipid Panel   Result Value Ref Range    Cholesterol, Total 169 <200 mg/dL    HDL  Cholesterol 86 (H) 40 - 59 mg/dL    Triglycerides 66 30 - 149 mg/dL    LDL Cholesterol 70 <100 mg/dL    VLDL 10 0 - 30 mg/dL    Non HDL Chol 83 <130 mg/dL    Patient Fasting for Lipid? Yes             ASSESSMENT AND PLAN:  1. Vertigo  Educated.   No driving when dizzy or drowsy.   Trial meclizine when dizzy. Can make her drowsy.   Given exercises to do.   - meclizine 12.5 MG Oral Tab; Take 1 tablet (12.5 mg total) by mouth 2 (two) times daily as needed.  Dispense: 20 tablet; Refill: 0    Rtc if persistent after a month or if worsening or if any new neurological symptoms. She verbalizes understanding.     Return if symptoms worsen or fail to improve.      Татьяна Horne MD

## 2024-02-05 DIAGNOSIS — R09.89 LABILE HYPERTENSION: ICD-10-CM

## 2024-02-05 RX ORDER — AMLODIPINE BESYLATE 5 MG/1
TABLET ORAL
Qty: 180 TABLET | Refills: 1 | Status: SHIPPED | OUTPATIENT
Start: 2024-02-05

## 2024-02-14 ENCOUNTER — NURSE ONLY (OUTPATIENT)
Dept: HEMATOLOGY/ONCOLOGY | Facility: HOSPITAL | Age: 79
End: 2024-02-14
Attending: OBSTETRICS & GYNECOLOGY
Payer: MEDICARE

## 2024-02-14 DIAGNOSIS — C54.1 ENDOMETRIAL SARCOMA (HCC): ICD-10-CM

## 2024-02-14 LAB — CANCER AG125 SERPL-ACNC: 2.2 U/ML (ref ?–35)

## 2024-02-14 PROCEDURE — 36415 COLL VENOUS BLD VENIPUNCTURE: CPT

## 2024-02-14 PROCEDURE — 86304 IMMUNOASSAY TUMOR CA 125: CPT

## 2024-02-15 ENCOUNTER — HOSPITAL ENCOUNTER (OUTPATIENT)
Dept: MAMMOGRAPHY | Age: 79
Discharge: HOME | End: 2024-02-15
Attending: INTERNAL MEDICINE
Payer: MEDICARE

## 2024-02-15 DIAGNOSIS — Z12.31 ENCOUNTER FOR SCREENING MAMMOGRAM FOR MALIGNANT NEOPLASM OF BREAST: ICD-10-CM

## 2024-02-15 PROCEDURE — 77063 BREAST TOMOSYNTHESIS BI: CPT | Performed by: INTERNAL MEDICINE

## 2024-02-15 PROCEDURE — 77067 SCR MAMMO BI INCL CAD: CPT | Performed by: INTERNAL MEDICINE

## 2024-03-13 ENCOUNTER — PATIENT MESSAGE (OUTPATIENT)
Dept: INTERNAL MEDICINE CLINIC | Facility: CLINIC | Age: 79
End: 2024-03-13

## 2024-03-13 DIAGNOSIS — K21.9 GASTROESOPHAGEAL REFLUX DISEASE, UNSPECIFIED WHETHER ESOPHAGITIS PRESENT: ICD-10-CM

## 2024-03-13 RX ORDER — PANTOPRAZOLE SODIUM 20 MG/1
20 TABLET, DELAYED RELEASE ORAL
Qty: 30 TABLET | Refills: 1 | Status: SHIPPED | OUTPATIENT
Start: 2024-03-13

## 2024-03-13 NOTE — TELEPHONE ENCOUNTER
From: Yi FELICIANO [facility]  To: Татьяна Horne  Sent: 3/13/2024 11:21 AM CDT  Subject: Refil of Pantoprazole 20 mg    This is a Rx from 2023. I stopped taking it and now have given it another try and I see some improvement. [facility] does not have this in their database any longer so need ’s approval . Thank you for your help in this matter.  Yi

## 2024-03-13 NOTE — TELEPHONE ENCOUNTER
Last OV relevant to medication: 4/29/22(visits since but last time med is addressed  Last refill date: 4/296/22 #30/refills: 1  When pt was asked to return for OV: 6/8/24  Upcoming appt/reason:   Future Appointments   Date Time Provider Department Center   6/10/2024 10:20 AM Татьяна Horne MD [facility] [place]   Was pt informed of any over due labs: due in june    Pt requesting refill as she tried again and is seeing improvement, has not seen GI yet. Please advise, thanks!

## 2024-05-15 PROBLEM — Q82.8 POROKERATOSIS: Status: ACTIVE | Noted: 2019-05-22

## 2024-05-30 ENCOUNTER — LAB ENCOUNTER (OUTPATIENT)
Dept: LAB | Age: 79
End: 2024-05-30
Attending: INTERNAL MEDICINE
Payer: MEDICARE

## 2024-05-30 DIAGNOSIS — E78.00 HYPERCHOLESTEROLEMIA: ICD-10-CM

## 2024-05-30 DIAGNOSIS — R73.03 PRE-DIABETES: ICD-10-CM

## 2024-05-30 DIAGNOSIS — E03.9 HYPOTHYROIDISM, UNSPECIFIED TYPE: ICD-10-CM

## 2024-05-30 LAB
ALBUMIN SERPL-MCNC: 3.7 G/DL (ref 3.4–5)
ALBUMIN/GLOB SERPL: 0.9 {RATIO} (ref 1–2)
ALP LIVER SERPL-CCNC: 83 U/L
ALT SERPL-CCNC: 27 U/L
ANION GAP SERPL CALC-SCNC: 4 MMOL/L (ref 0–18)
AST SERPL-CCNC: 26 U/L (ref 15–37)
BILIRUB SERPL-MCNC: 0.5 MG/DL (ref 0.1–2)
BUN BLD-MCNC: 9 MG/DL (ref 9–23)
CALCIUM BLD-MCNC: 9.2 MG/DL (ref 8.5–10.1)
CHLORIDE SERPL-SCNC: 106 MMOL/L (ref 98–112)
CHOLEST SERPL-MCNC: 171 MG/DL (ref ?–200)
CO2 SERPL-SCNC: 29 MMOL/L (ref 21–32)
CREAT BLD-MCNC: 1.16 MG/DL
EGFRCR SERPLBLD CKD-EPI 2021: 48 ML/MIN/1.73M2 (ref 60–?)
EST. AVERAGE GLUCOSE BLD GHB EST-MCNC: 126 MG/DL (ref 68–126)
FASTING PATIENT LIPID ANSWER: YES
FASTING STATUS PATIENT QL REPORTED: YES
GLOBULIN PLAS-MCNC: 4 G/DL (ref 2.8–4.4)
GLUCOSE BLD-MCNC: 97 MG/DL (ref 70–99)
HBA1C MFR BLD: 6 % (ref ?–5.7)
HDLC SERPL-MCNC: 86 MG/DL (ref 40–59)
LDLC SERPL CALC-MCNC: 75 MG/DL (ref ?–100)
NONHDLC SERPL-MCNC: 85 MG/DL (ref ?–130)
OSMOLALITY SERPL CALC.SUM OF ELEC: 287 MOSM/KG (ref 275–295)
POTASSIUM SERPL-SCNC: 3.8 MMOL/L (ref 3.5–5.1)
PROT SERPL-MCNC: 7.7 G/DL (ref 6.4–8.2)
SODIUM SERPL-SCNC: 139 MMOL/L (ref 136–145)
TRIGL SERPL-MCNC: 51 MG/DL (ref 30–149)
TSI SER-ACNC: 2.61 MIU/ML (ref 0.36–3.74)
VLDLC SERPL CALC-MCNC: 8 MG/DL (ref 0–30)

## 2024-05-30 PROCEDURE — 80061 LIPID PANEL: CPT

## 2024-05-30 PROCEDURE — 80053 COMPREHEN METABOLIC PANEL: CPT

## 2024-05-30 PROCEDURE — 83036 HEMOGLOBIN GLYCOSYLATED A1C: CPT

## 2024-05-30 PROCEDURE — 84443 ASSAY THYROID STIM HORMONE: CPT

## 2024-05-30 PROCEDURE — 36415 COLL VENOUS BLD VENIPUNCTURE: CPT

## 2024-06-10 ENCOUNTER — LAB ENCOUNTER (OUTPATIENT)
Dept: LAB | Age: 79
End: 2024-06-10
Attending: INTERNAL MEDICINE
Payer: MEDICARE

## 2024-06-10 ENCOUNTER — OFFICE VISIT (OUTPATIENT)
Dept: INTERNAL MEDICINE CLINIC | Facility: CLINIC | Age: 79
End: 2024-06-10
Payer: MEDICARE

## 2024-06-10 VITALS
SYSTOLIC BLOOD PRESSURE: 130 MMHG | BODY MASS INDEX: 28.51 KG/M2 | HEIGHT: 65 IN | WEIGHT: 171.13 LBS | DIASTOLIC BLOOD PRESSURE: 70 MMHG | HEART RATE: 64 BPM | RESPIRATION RATE: 12 BRPM | TEMPERATURE: 97 F

## 2024-06-10 DIAGNOSIS — R68.89 DECREASED EXERCISE TOLERANCE: ICD-10-CM

## 2024-06-10 DIAGNOSIS — Z85.43 HISTORY OF OVARIAN CANCER: ICD-10-CM

## 2024-06-10 DIAGNOSIS — M25.562 CHRONIC PAIN OF LEFT KNEE: ICD-10-CM

## 2024-06-10 DIAGNOSIS — Z87.2: ICD-10-CM

## 2024-06-10 DIAGNOSIS — R79.89 ELEVATED SERUM CREATININE: ICD-10-CM

## 2024-06-10 DIAGNOSIS — J30.89 NON-SEASONAL ALLERGIC RHINITIS, UNSPECIFIED TRIGGER: ICD-10-CM

## 2024-06-10 DIAGNOSIS — M79.644 FINGER PAIN, RIGHT: ICD-10-CM

## 2024-06-10 DIAGNOSIS — Z85.44 HISTORY OF CANCER OF FALLOPIAN TUBE IN ADULTHOOD: ICD-10-CM

## 2024-06-10 DIAGNOSIS — G89.29 CHRONIC PAIN OF LEFT KNEE: ICD-10-CM

## 2024-06-10 DIAGNOSIS — Z00.00 ENCOUNTER FOR ANNUAL HEALTH EXAMINATION: Primary | ICD-10-CM

## 2024-06-10 DIAGNOSIS — M25.552 LEFT HIP PAIN: ICD-10-CM

## 2024-06-10 DIAGNOSIS — E78.00 HYPERCHOLESTEROLEMIA: ICD-10-CM

## 2024-06-10 DIAGNOSIS — I25.10 NON-OCCLUSIVE CORONARY ARTERY DISEASE: ICD-10-CM

## 2024-06-10 DIAGNOSIS — R73.03 PRE-DIABETES: ICD-10-CM

## 2024-06-10 DIAGNOSIS — Z12.31 ENCOUNTER FOR SCREENING MAMMOGRAM FOR MALIGNANT NEOPLASM OF BREAST: ICD-10-CM

## 2024-06-10 DIAGNOSIS — R09.89 LABILE HYPERTENSION: ICD-10-CM

## 2024-06-10 DIAGNOSIS — N39.46 MIXED STRESS AND URGE URINARY INCONTINENCE: ICD-10-CM

## 2024-06-10 DIAGNOSIS — Q82.8 POROKERATOSIS: ICD-10-CM

## 2024-06-10 DIAGNOSIS — L60.0 ONYCHOCRYPTOSIS: ICD-10-CM

## 2024-06-10 DIAGNOSIS — E03.9 HYPOTHYROIDISM, UNSPECIFIED TYPE: ICD-10-CM

## 2024-06-10 PROBLEM — M54.16 LUMBAR RADICULOPATHY: Status: RESOLVED | Noted: 2019-02-01 | Resolved: 2024-06-10

## 2024-06-10 LAB
ATRIAL RATE: 66 BPM
BASOPHILS # BLD AUTO: 0.05 X10(3) UL (ref 0–0.2)
BASOPHILS NFR BLD AUTO: 0.6 %
BUN BLD-MCNC: 12 MG/DL (ref 9–23)
CREAT BLD-MCNC: 0.88 MG/DL
EGFRCR SERPLBLD CKD-EPI 2021: 67 ML/MIN/1.73M2 (ref 60–?)
EOSINOPHIL # BLD AUTO: 0.15 X10(3) UL (ref 0–0.7)
EOSINOPHIL NFR BLD AUTO: 1.7 %
ERYTHROCYTE [DISTWIDTH] IN BLOOD BY AUTOMATED COUNT: 12.8 %
FOLATE SERPL-MCNC: 17 NG/ML (ref 8.7–?)
HCT VFR BLD AUTO: 38.1 %
HGB BLD-MCNC: 13 G/DL
IMM GRANULOCYTES # BLD AUTO: 0.03 X10(3) UL (ref 0–1)
IMM GRANULOCYTES NFR BLD: 0.3 %
LYMPHOCYTES # BLD AUTO: 3.57 X10(3) UL (ref 1–4)
LYMPHOCYTES NFR BLD AUTO: 40.5 %
MCH RBC QN AUTO: 31.1 PG (ref 26–34)
MCHC RBC AUTO-ENTMCNC: 34.1 G/DL (ref 31–37)
MCV RBC AUTO: 91.1 FL
MONOCYTES # BLD AUTO: 0.79 X10(3) UL (ref 0.1–1)
MONOCYTES NFR BLD AUTO: 9 %
NEUTROPHILS # BLD AUTO: 4.23 X10 (3) UL (ref 1.5–7.7)
NEUTROPHILS # BLD AUTO: 4.23 X10(3) UL (ref 1.5–7.7)
NEUTROPHILS NFR BLD AUTO: 47.9 %
P AXIS: 60 DEGREES
P-R INTERVAL: 154 MS
PLATELET # BLD AUTO: 295 10(3)UL (ref 150–450)
Q-T INTERVAL: 458 MS
QRS DURATION: 96 MS
QTC CALCULATION (BEZET): 480 MS
R AXIS: 47 DEGREES
RBC # BLD AUTO: 4.18 X10(6)UL
T AXIS: 68 DEGREES
VENTRICULAR RATE: 66 BPM
VIT B12 SERPL-MCNC: 358 PG/ML (ref 193–986)
WBC # BLD AUTO: 8.8 X10(3) UL (ref 4–11)

## 2024-06-10 PROCEDURE — 3008F BODY MASS INDEX DOCD: CPT | Performed by: INTERNAL MEDICINE

## 2024-06-10 PROCEDURE — 96160 PT-FOCUSED HLTH RISK ASSMT: CPT | Performed by: INTERNAL MEDICINE

## 2024-06-10 PROCEDURE — 1170F FXNL STATUS ASSESSED: CPT | Performed by: INTERNAL MEDICINE

## 2024-06-10 PROCEDURE — G0439 PPPS, SUBSEQ VISIT: HCPCS | Performed by: INTERNAL MEDICINE

## 2024-06-10 PROCEDURE — 82746 ASSAY OF FOLIC ACID SERUM: CPT

## 2024-06-10 PROCEDURE — 36415 COLL VENOUS BLD VENIPUNCTURE: CPT

## 2024-06-10 PROCEDURE — 82565 ASSAY OF CREATININE: CPT

## 2024-06-10 PROCEDURE — 99214 OFFICE O/P EST MOD 30 MIN: CPT | Performed by: INTERNAL MEDICINE

## 2024-06-10 PROCEDURE — 82607 VITAMIN B-12: CPT

## 2024-06-10 PROCEDURE — 3075F SYST BP GE 130 - 139MM HG: CPT | Performed by: INTERNAL MEDICINE

## 2024-06-10 PROCEDURE — 93000 ELECTROCARDIOGRAM COMPLETE: CPT | Performed by: INTERNAL MEDICINE

## 2024-06-10 PROCEDURE — 84520 ASSAY OF UREA NITROGEN: CPT

## 2024-06-10 PROCEDURE — 85025 COMPLETE CBC W/AUTO DIFF WBC: CPT

## 2024-06-10 PROCEDURE — 99499 UNLISTED E&M SERVICE: CPT | Performed by: INTERNAL MEDICINE

## 2024-06-10 PROCEDURE — 3078F DIAST BP <80 MM HG: CPT | Performed by: INTERNAL MEDICINE

## 2024-06-10 NOTE — PATIENT INSTRUCTIONS
Yi Milan's SCREENING SCHEDULE   Tests on this list are recommended by your physician but may not be covered, or covered at this frequency, by your insurer.   Please check with your insurance carrier before scheduling to verify coverage.   PREVENTATIVE SERVICES FREQUENCY &  COVERAGE DETAILS LAST COMPLETION DATE   Diabetes Screening    Fasting Blood Sugar /  Glucose    One screening every 12 months if never tested or if previously tested but not diagnosed with pre-diabetes   One screening every 6 months if diagnosed with pre-diabetes Lab Results   Component Value Date    GLU 97 05/30/2024        Cardiovascular Disease Screening    Lipid Panel  Cholesterol  Lipoprotein (HDL)  Triglycerides Covered every 5 years for all Medicare beneficiaries without apparent signs or symptoms of cardiovascular disease Lab Results   Component Value Date    CHOLEST 171 05/30/2024    HDL 86 (H) 05/30/2024    LDL 75 05/30/2024    TRIG 51 05/30/2024         Electrocardiogram (EKG)   Covered if needed at Welcome to Medicare, and non-screening if indicated for medical reasons 10/24/2016      Ultrasound Screening for Abdominal Aortic Aneurysm (AAA) Covered once in a lifetime for one of the following risk factors   • Men who are 65-75 years old and have ever smoked   • Anyone with a family history -     Colorectal Cancer Screening  Covered for ages 50-85; only need ONE of the following:    Colonoscopy   Covered every 10 years    Covered every 2 years if patient is at high risk or previous colonoscopy was abnormal 06/01/2016    Health Maintenance   Topic Date Due   • Colorectal Cancer Screening  Discontinued       Flexible Sigmoidoscopy   Covered every 4 years -    Fecal Occult Blood Test Covered annually -   Bone Density Screening    Bone density screening    Covered every 2 years after age 65 if diagnosed with risk of osteoporosis or estrogen deficiency.    Covered yearly for long-term glucocorticoid medication use (Steroids) Last Dexa  Scan:    XR DEXA BONE DENSITOMETRY (CPT=77080) 04/28/2022      No recommendations at this time   Pap and Pelvic    Pap   Covered every 2 years for women at normal risk; Annually if at high risk -  No recommendations at this time    Chlamydia Annually if high risk -  No recommendations at this time   Screening Mammogram    Mammogram     Recommend annually for all female patients aged 40 and older    One baseline mammogram covered for patients aged 35-39 02/15/2024    Health Maintenance   Topic Date Due   • Mammogram  02/15/2025       Immunizations    Influenza Covered once per flu season  Please get every year 11/06/2023  No recommendations at this time    Pneumococcal Each vaccine ([id number] & [id number]) covered once after 65 Prevnar 13: 10/13/2015    [id number]: 10/17/2016     No recommendations at this time    Hepatitis B One screening covered for patients with certain risk factors   -  No recommendations at this time    Tetanus Toxoid Not covered by Medicare Part B unless medically necessary (cut with metal); may be covered with your pharmacy prescription benefits -    Tetanus, Diptheria and Pertusis TD and TDaP Not covered by Medicare Part B -  No recommendations at this time    Zoster Not covered by Medicare Part B; may be covered with your pharmacy  prescription benefits 04/26/2011  No recommendations at this time     Annual Monitoring of Persistent Medications (ACE/ARB, digoxin diuretics, anticonvulsants)    Potassium Annually Lab Results   Component Value Date    K 3.8 05/30/2024         Creatinine   Annually Lab Results   Component Value Date    CREATSERUM 1.16 (H) 05/30/2024         BUN Annually Lab Results   Component Value Date    BUN 9 05/30/2024       Drug Serum Conc Annually No results found for: \"DIGOXIN\", \"DIG\", \"VALP\"

## 2024-06-10 NOTE — PROGRESS NOTES
Subjective:   Yi Diaz is a 79 year old female who presents for a MA (Medicare Advantage) Supervisit (Once per calendar year) and med check.     mammo done 2/2024. Ordered for next year.   Colonoscopy done at age 71. She does not want any more. No polyps.   dexa done 4/2022. Normal.   She gets pelvic exams with dr. Villa. Sees him due to prior history of ovarian cancer.      Up to date prevnar 20, shingrix.   Due tdap, covid booster (last 11/2023), rsv. Get these at her local pharmacy.       [facility] flowsheet reviewed.   No falls.   Memory testing normal.   Mood has been stable. No depression.   Seeing eye doctor yearly.     Complains of decreased exercise tolerance. Walks for exercise and finds she gets really tired or drained and has to sit and rest for some time. Almost feels a bit lightheaded, has to find a seat.   No chest pain, no shortness of breath. No palpitations.  Only when she goes for a walk, doesn't happen at other times of the day.   She has a history of nonobstructive cad.     Right ring finger is swollen at the pip, has had it for some time. Not tender but it makes it hard to make a fist.     Has some foot oa and has seen dr. Avila for it and had an injection but didn't help much. Okay for now. May want to see ortho in the future.     Creatinine mildly elevated on recent labs.   A1c pre diabetic but stable.       History/Other:   Fall Risk Assessment:   She has been screened for Falls and is low risk.      Cognitive Assessment:   She had a completely normal cognitive assessment - see flowsheet entries       Functional Ability/Status:   Yi Diaz has some abnormal functions as listed below:  She has Hearing problems based on screening of functional status.      Depression Screening (PHQ-2/PHQ-9): PHQ-2 SCORE: 0  , done 6/10/2024   Last Bartholomew Suicide Screening on 6/10/2024 was No Risk.       Advanced Directives:   She does have a Living Will but we do NOT have it on file in Epic.    She has  a Power of  for Health Care on file in [facility].  Discussed with patient and provided information.        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    Current Medications:  Outpatient Medications Marked as Taking for the 6/10/24 encounter (Office Visit) with Татьяна Horne MD   Medication Sig    pantoprazole 20 MG Oral Tab EC Take 1 tablet (20 mg total) by mouth every morning before breakfast. (Patient taking differently: Take 1 tablet (20 mg total) by mouth every morning before breakfast. Takes PRN)    amLODIPine 5 MG Oral Tab TAKE 1 TABLET BY MOUTH IN THE MORNING AND AT NIGHT    ATORVASTATIN 80 MG Oral Tab TAKE 1 TABLET(80 MG) BY MOUTH EVERY NIGHT    LOSARTAN 50 MG Oral Tab TAKE 1 TABLET(50 MG) BY MOUTH DAILY    LEVOTHYROXINE 75 MCG Oral Tab TAKE 1 TABLET(75 MCG) BY MOUTH BEFORE BREAKFAST    hydrocortisone 2.5 % External Cream Place 1 Application rectally 2 (two) times daily.    azelastine 0.1 % Nasal Solution 2 sprays by Nasal route daily as needed.    acetaminophen 500 MG Oral Tab Take 1 tablet (500 mg total) by mouth every 6 (six) hours as needed for Pain. As needed    Cholecalciferol (VITAMIN D OR) Take 1,000 Units by mouth every morning.         Medical History:  She  has a past medical history of Arthritis (2018), Belching, Cancer (HCC) (2015), Cataract, Constipation, Diarrhea, unspecified, Essential hypertension (1990), Fallopian tube cancer, carcinoma (HCC) (04/2015), Flatulence/gas pain/belching, Fracture of right  foot, Frequent urination, Hearing loss, Herniated disc (), High cholesterol, Hyperlipidemia (?), Hypothyroidism (?), Irregular bowel habits, L3-4 grade 1 unstable spondylolisthesis (2019), Loss of appetite, Migraine, Nausea, Sleep disturbance, Uncomfortable fullness after meals, Vasovagal syncope (06/10/2016), Vomiting, and Wears glasses.  Surgical History:  She  has a past surgical history that includes cataract (,); oophorectomy (); chemotherapy (6 treatments); colonoscopy (?); glaucoma surgery (?);  (, , ); hysterectomy (2015); other surgical history; other surgical history; and other surgical history.   Family History:  Her family history includes Diverticulitis in an other family member; Heart Disorder in her maternal grandfather; Heart failure in her mother; Hypertension in her father; Other in her father; Stroke in her father.  Social History:  She  reports that she has never smoked. She has never used smokeless tobacco. She reports current alcohol use of about 10.0 standard drinks of alcohol per week. She reports that she does not use drugs.    Tobacco:  She has never smoked tobacco.    CAGE Alcohol Screen:   CAGE screening score of 0 on 6/10/2024, showing low risk of alcohol abuse.      Patient Care Team:  Татьяна Horne MD as PCP - General (Internal Medicine)  Fercho Villa MD (OBSTETRICS & GYNECOLOGY)  [facility] (Internal Medicine)  Trevon Gonzalez MD (Physical Medicine)  Melly Elizabeth APRN (Nurse Practitioner)    Review of Systems  GENERAL: feels well otherwise  SKIN: denies any unusual skin lesions  EYES:denies blurred vision or double vision  HEENT: denies nasal congestion, sinus pain or ST  LUNGS: denies shortness of breath with exertion  CARDIOVASCULAR: denies chest pain on exertion  GI: denies abdominal pain,denies heartburn  : denies dysuria, vaginal discharge or itching  MUSCULOSKELETAL: denies back pain  NEURO: denies headaches  PSYCHE:  denies depression or anxiety  HEMATOLOGIC: denies hx of anemia  ENDOCRINE: hypothyroid  ALL/ASTHMA: denies hx of allergy or asthma     Objective:   Physical Exam  General Appearance:  Alert, cooperative, no distress, appears stated age   Head:  Normocephalic, without obvious abnormality, atraumatic   Eyes:  PERRL, conjunctiva/corneas clear, EOM's intact both eyes   Ears:  Normal TM's and external ear canals, both ears   Nose: Nares normal, septum midline,mucosa normal, no drainage or sinus tenderness   Throat: Lips, mucosa, and tongue normal; teeth and gums normal   Neck: Supple, symmetrical, trachea midline, no adenopathy;  thyroid: not enlarged, symmetric, no tenderness/mass/nodules; no carotid bruit or JVD   Back:   Symmetric, no curvature, ROM normal, no CVA tenderness   Lungs:   Clear to auscultation bilaterally, respirations unlabored   Heart:  Regular rate and rhythm, S1 and S2 normal, no murmur, rub, or gallop   Abdomen:   Soft, non-tender, bowel sounds active all four quadrants,  no masses, no organomegaly   Pelvic: Deferred   Extremities: Extremities normal, atraumatic, no cyanosis or edema   Pulses: 2+ and symmetric   Skin: Skin color, texture, turgor normal, no rashes or lesions   Lymph nodes: Cervical, supraclavicular, and axillary nodes normal   Neurologic: Normal       /70 (BP Location: Left arm, Patient Position: Sitting, Cuff Size: large)   Pulse 64   Temp 97.2 °F (36.2 °C) (Temporal)   Resp 12   Ht 5' 5\" (1.651 m)   Wt 171 lb 1.6 oz (77.6 kg)   Breastfeeding No   BMI 28.47 kg/m²  Estimated body mass index is 28.47 kg/m² as calculated from the following:    Height as of this encounter: 5' 5\" (1.651 m).    Weight as of this encounter: 171 lb 1.6 oz (77.6 kg).    Medicare Hearing Assessment:   Hearing Screening    Screening Method: Finger Rub  Finger Rub Result: Pass               Assessment & Plan:   Yi Diaz is a 79 year old female who presents for a Medicare Assessment.     1.  Encounter for annual health examination  mammo done 2/2024. Ordered for next year.   Colonoscopy done at age 71. She does not want any more. No polyps.   dexa done 4/2022. Normal.   She gets pelvic exams with dr. Villa. Sees him due to prior history of ovarian cancer.      Up to date prevnar 20, shingrix.   Due tdap, covid booster (last 11/2023), rsv. Get these at her local pharmacy.       AHA flowsheet reviewed.   No falls.   Memory testing normal.   Mood has been stable. No depression.   Seeing eye doctor yearly.     2. Labile hypertension  Continue meds.     3. Hypercholesterolemia  Continue statin.     4. Hypothyroidism, unspecified type  Continue levothyroxine.     5. Pre-diabetes  Low carb diet.     6. Non-occlusive coronary artery disease: 30% mid LAD 2015  With some decreased exercise tolerance.   Will check stress test and echo.   - EKG with interpretation and Report -IN OFFICE [[id number]]  - CARD ECHO STRESS ECHO/REST AND STRESS(CPT=93350/34487 DMG 92809); Future  - CARD ECHO 2D DOPPLER (CPT=93306); Future    7. Chronic pain of left knee  8. Left hip pain with slight to mild OA  Stable. Continue current management.      9. Mixed stress and urge urinary incontinence  Stable. Continue current management.      10. 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 History of ovarian cancer  follow up with dr. Villa.     12. Non-seasonal allergic rhinitis, unspecified trigger  Stable.   Also taking ppi prn.    13. H/O onychia and paronychia  14. Onychocryptosis  15. Porokeratosis  follow up with podiatry.     16. Finger pain, right  - Ortho Referral - In Network    17. Elevated serum creatinine  - BUN; Future  - Creatinine, Serum; Future  Labs in 3 months.     18. Encounter for screening mammogram for malignant neoplasm of breast  - [facility] SHIN 2D+3D SCREENING BILAT (CPT=77067/93036); Future    19. Decreased exercise tolerance  - EKG with interpretation and Report -IN OFFICE [[id number]]  - CARD ECHO STRESS ECHO/REST  AND STRESS(CPT=93350/61830 DMG 93814); Future  - CARD ECHO 2D DOPPLER (CPT=93306); Future  - CBC W Differential W Platelet [E]; Future  - Vitamin B12 [E]; Future  - Folic Acid Serum (Folate); Future      The patient indicates understanding of these issues and agrees to the plan.  Reinforced healthy diet, lifestyle, and exercise.      Return in about 6 months (around 12/10/2024) for med check.     Татьяна Horne MD, 6/9/2024     Supplementary Documentation:   General Health:  In the past six months, have you lost more than 10 pounds without trying?: 2 - No  Has your appetite been poor?: Yes  Type of Diet: Balanced  How does the patient maintain a good energy level?: Appropriate Exercise  How would you describe your daily physical activity?: Moderate  How would you describe your current health state?: Good  How do you maintain positive mental well-being?: Social Interaction;Puzzles;Games;Visiting Friends;Visiting Family  On a scale of 0 to 10, with 0 being no pain and 10 being severe pain, what is your pain level?: 2 - (Mild) (ring finger of right hand. top of right foot. Right knee.)  In the past six months, have you experienced urine leakage?: 1-Yes  At any time do you feel concerned for the safety/well-being of yourself and/or your children, in your home or elsewhere?: No  Have you had any immunizations at another office such as Influenza, Hepatitis B, Tetanus, or Pneumococcal?: Yes       [facility]ELICIANO [facility]'s SCREENING SCHEDULE   Tests on this list are recommended by your physician but may not be covered, or covered at this frequency, by your insurer.   Please check with your insurance carrier before scheduling to verify coverage.   PREVENTATIVE SERVICES FREQUENCY &  COVERAGE DETAILS LAST COMPLETION DATE   Diabetes Screening    Fasting Blood Sugar /  Glucose    One screening every 12 months if never tested or if previously tested but not diagnosed with pre-diabetes   One screening every 6 months if diagnosed with  pre-diabetes Lab Results   Component Value Date    GLU 97 05/30/2024        Cardiovascular Disease Screening    Lipid Panel  Cholesterol  Lipoprotein (HDL)  Triglycerides Covered every 5 years for all Medicare beneficiaries without apparent signs or symptoms of cardiovascular disease Lab Results   Component Value Date    CHOLEST 171 05/30/2024    HDL 86 (H) 05/30/2024    LDL 75 05/30/2024    TRIG 51 05/30/2024         Electrocardiogram (EKG)   Covered if needed at Welcome to Medicare, and non-screening if indicated for medical reasons 06/10/2024      Ultrasound Screening for Abdominal Aortic Aneurysm (AAA) Covered once in a lifetime for one of the following risk factors    Men who are 65-75 years old and have ever smoked    Anyone with a family history -     Colorectal Cancer Screening  Covered for ages 50-85; only need ONE of the following:    Colonoscopy   Covered every 10 years    Covered every 2 years if patient is at high risk or previous colonoscopy was abnormal 06/01/2016    Health Maintenance   Topic Date Due    Colorectal Cancer Screening  Discontinued       Flexible Sigmoidoscopy   Covered every 4 years -    Fecal Occult Blood Test Covered annually -   Bone Density Screening    Bone density screening    Covered every 2 years after age 65 if diagnosed with risk of osteoporosis or estrogen deficiency.    Covered yearly for long-term glucocorticoid medication use (Steroids) Last Dexa Scan:    XR DEXA BONE DENSITOMETRY (CPT=77080) 04/28/2022      No recommendations at this time   Pap and Pelvic    Pap   Covered every 2 years for women at normal risk; Annually if at high risk -  No recommendations at this time    Chlamydia Annually if high risk -  No recommendations at this time   Screening Mammogram    Mammogram     Recommend annually for all female patients aged 40 and older    One baseline mammogram covered for patients aged 35-39 02/15/2024    Health Maintenance   Topic Date Due    Mammogram  02/15/2025        Immunizations    Influenza Covered once per flu season  Please get every year 11/06/2023  No recommendations at this time    Pneumococcal Each vaccine (Cssgcqo76 & Wvwyprpwx09) covered once after 65 Prevnar 13: 10/13/2015    Xohtsshwl22: 10/17/2016     No recommendations at this time    Hepatitis B One screening covered for patients with certain risk factors   -  No recommendations at this time    Tetanus Toxoid Not covered by Medicare Part B unless medically necessary (cut with metal); may be covered with your pharmacy prescription benefits -    Tetanus, Diptheria and Pertusis TD and TDaP Not covered by Medicare Part B -  No recommendations at this time    Zoster Not covered by Medicare Part B; may be covered with your pharmacy  prescription benefits 04/26/2011  No recommendations at this time     Annual Monitoring of Persistent Medications (ACE/ARB, digoxin diuretics, anticonvulsants)    Potassium Annually Lab Results   Component Value Date    K 3.8 05/30/2024         Creatinine   Annually Lab Results   Component Value Date    CREATSERUM 1.16 (H) 05/30/2024         BUN Annually Lab Results   Component Value Date    BUN 9 05/30/2024       Drug Serum Conc Annually No results found for: \"DIGOXIN\", \"DIG\", \"VALP\"

## 2024-06-24 ENCOUNTER — HOSPITAL ENCOUNTER (OUTPATIENT)
Dept: CV DIAGNOSTICS | Facility: HOSPITAL | Age: 79
Discharge: HOME | End: 2024-06-24
Attending: INTERNAL MEDICINE

## 2024-06-24 DIAGNOSIS — R68.89 DECREASED EXERCISE TOLERANCE: ICD-10-CM

## 2024-06-24 DIAGNOSIS — I25.10 NON-OCCLUSIVE CORONARY ARTERY DISEASE: ICD-10-CM

## 2024-06-24 PROCEDURE — 93350 STRESS TTE ONLY: CPT | Performed by: INTERNAL MEDICINE

## 2024-06-24 PROCEDURE — 93018 CV STRESS TEST I&R ONLY: CPT | Performed by: INTERNAL MEDICINE

## 2024-06-24 PROCEDURE — 93306 TTE W/DOPPLER COMPLETE: CPT | Performed by: INTERNAL MEDICINE

## 2024-06-24 PROCEDURE — 93017 CV STRESS TEST TRACING ONLY: CPT | Performed by: INTERNAL MEDICINE

## 2024-06-27 ENCOUNTER — HOSPITAL ENCOUNTER (OUTPATIENT)
Dept: GENERAL RADIOLOGY | Age: 79
Discharge: HOME | End: 2024-06-27
Attending: PHYSICIAN ASSISTANT

## 2024-06-27 ENCOUNTER — TELEPHONE (OUTPATIENT)
Dept: ORTHOPEDICS CLINIC | Facility: CLINIC | Age: 79
End: 2024-06-27

## 2024-06-27 ENCOUNTER — OFFICE VISIT (OUTPATIENT)
Dept: ORTHOPEDICS CLINIC | Facility: CLINIC | Age: 79
End: 2024-06-27

## 2024-06-27 VITALS — BODY MASS INDEX: 28.49 KG/M2 | HEIGHT: 65 IN | WEIGHT: 171 LBS

## 2024-06-27 DIAGNOSIS — M79.644 FINGER PAIN, RIGHT: ICD-10-CM

## 2024-06-27 DIAGNOSIS — M19.049 ARTHRITIS OF FINGER: ICD-10-CM

## 2024-06-27 DIAGNOSIS — M79.644 FINGER PAIN, RIGHT: Primary | ICD-10-CM

## 2024-06-27 PROCEDURE — 1125F AMNT PAIN NOTED PAIN PRSNT: CPT | Performed by: PHYSICIAN ASSISTANT

## 2024-06-27 PROCEDURE — 1160F RVW MEDS BY RX/DR IN RCRD: CPT | Performed by: PHYSICIAN ASSISTANT

## 2024-06-27 PROCEDURE — 99203 OFFICE O/P NEW LOW 30 MIN: CPT | Performed by: PHYSICIAN ASSISTANT

## 2024-06-27 PROCEDURE — 1159F MED LIST DOCD IN RCRD: CPT | Performed by: PHYSICIAN ASSISTANT

## 2024-06-27 PROCEDURE — 73140 X-RAY EXAM OF FINGER(S): CPT | Performed by: PHYSICIAN ASSISTANT

## 2024-06-27 PROCEDURE — 3008F BODY MASS INDEX DOCD: CPT | Performed by: PHYSICIAN ASSISTANT

## 2024-06-27 NOTE — TELEPHONE ENCOUNTER
Spoke with Vilma pharmacist at [facility] who stated they need specific instructions for the diclofenac gel number of times per day.  4 times a day as needed?    Medication Quantity Refills Start End   diclofenac 1 % External Gel 100 g 1 6/27/2024 --   Sig:   Apply 2 g topically as needed. Generic ok     Route:   Topical     Order #:   [id number]

## 2024-06-27 NOTE — H&P
Clinic Note [facility]     Assessment/Plan:  79 year old female    Right ring finger PIP joint osteoarthritis-we discussed conservative management versus surgical intervention with either joint replacement or fusion.  She is not interested in surgery at this point.  She will continue with nonoperative/conservative management.  I will refill her Voltaren.  All of her questions were answered.      ICD-10-CM    1. Finger pain, right  M79.644 CANCELED: XR FINGER(S) (MIN 2 VIEWS), RIGHT 2ND (CPT=73140)      2. Arthritis of finger  M19.049            Follow Up: As needed    Diagnostic Studies:  X-rays reveal diffuse IP joint arthritis with severe erosive arthritis to the right ring finger PIP joint    Physical Exam:    Ht 5' 5\" (1.651 m)   Wt 171 lb (77.6 kg)   BMI 28.46 kg/m²     Constitutional: NAD. AOx3. Well-developed and Well-nourished.   Psychiatric: Normal mood/ affect/ behavior. Judgment and thought content normal.     Right upper Extremity:   Inspection: Skin Intact. No skin lesions.  Significant swelling surrounding the PIP joint of the ring finger no instability   Palpation: Tender palpation of the PIP joint.   Motion: Elbow: normal bilateral symmetric ext/flex  Wrist: normal bilateral symmetric ext/flex/sup/pro  Finger: full composite fist, range of motion of the PIP joint close to normal loss about tender 15 degrees of extension flexion       CC: Other (RT RING FINGER; SWOLLEN, MOBILITY)        HPI: This 79 year old female presents with complaints of pain and swelling to her right ring finger PIP joint.  She states has been going on many years but overall getting worse over the last 6 months.  She had to get a ring resized given the swelling.  She has not had any significant treatment.  She is been using Voltaren    [email or web address]      Past Medical History:    Arthritis    Belching    Cancer (HCC)    Cataract    Constipation    Diarrhea, unspecified    Essential hypertension    Fallopian tube  cancer, carcinoma (HCC)    Stage 1C papillary serous    Flatulence/gas pain/belching    Fracture of right foot    Frequent urination    Hearing loss    Herniated disc    Relieved with KENDALL x 1    High cholesterol    Hyperlipidemia    Hypothyroidism    Irregular bowel habits    L3-4 grade 1 unstable spondylolisthesis    Loss of appetite    Migraine    Nausea    Sleep disturbance    Uncomfortable fullness after meals    Vasovagal syncope    Vomiting    Wears glasses       Past Surgical History:   Procedure Laterality Date    Cataract  ,    Bilateral-then redo on L. Dr. Garcia    Chemotherapy  6 treatments    summer 2015 fallopian tube cancer    Colonoscopy  ?    Glaucoma surgery  ?    Hysterectomy  2015    stage 1c, fallopian tube cancer, Dr. Villa, robotic, EDW      , ,     Oophorectomy      Other surgical history      bilateral cataract surgery    Other surgical history      arthritic cyst removed from finger    Other surgical history      fatty cyst removed from arm       Current Outpatient Medications   Medication Sig Dispense Refill    diclofenac 1 % External Gel Apply 2 g topically as needed. Generic ok 100 g 1    Cyanocobalamin (B-12 OR) Take by mouth daily.      pantoprazole 20 MG Oral Tab EC Take 1 tablet (20 mg total) by mouth every morning before breakfast. (Patient taking differently: Take 1 tablet (20 mg total) by mouth every morning before breakfast. Takes PRN) 30 tablet 1    amLODIPine 5 MG Oral Tab TAKE 1 TABLET BY MOUTH IN THE MORNING AND AT NIGHT 180 tablet 1    ATORVASTATIN 80 MG Oral Tab TAKE 1 TABLET(80 MG) BY MOUTH EVERY NIGHT 90 tablet 1    LOSARTAN 50 MG Oral Tab TAKE 1 TABLET(50 MG) BY MOUTH DAILY 90 tablet 1    LEVOTHYROXINE 75 MCG Oral Tab TAKE 1 TABLET(75 MCG) BY MOUTH BEFORE BREAKFAST 90 tablet 3    hydrocortisone 2.5 % External Cream Place 1 Application rectally 2 (two) times daily. 28 g 1    azelastine 0.1 % Nasal Solution 2 sprays by Nasal route  daily as needed.      acetaminophen 500 MG Oral Tab Take 1 tablet (500 mg total) by mouth every 6 (six) hours as needed for Pain. As needed      Cholecalciferol (VITAMIN D OR) Take 1,000 Units by mouth every morning.           Allergies   Allergen Reactions    Crabs (Crustaceans)      vomitting and diarrhea    Myrbetriq [Mirabegron] OTHER (SEE COMMENTS)     Leg swelling    Shrimp DIARRHEA       Family History   Problem Relation Age of Onset    Other (Heart failure) Mother          at 76    Stroke Father          at 43 from cerebral hemorrhage    Hypertension Father         Due to kidney failure    Other (Other) Father         kidney disease    Heart Disorder Maternal Grandfather     Other (Diverticulitis) Other         , CHF, AF       Social History     Occupational History    Occupation: Homemaker   Tobacco Use    Smoking status: Never    Smokeless tobacco: Never    Tobacco comments:     No 2nd hand exposure   Vaping Use    Vaping status: Never Used   Substance and Sexual Activity    Alcohol use: Yes     Alcohol/week: 10.0 standard drinks of alcohol     Types: 5 Glasses of wine, 5 Standard drinks or equivalent per week     Comment: 1 glass a wine nightly with dinner    Drug use: No    Sexual activity: Not on file        Review of Systems (negative unless bolded):  General: fevers, chills, fatigue  CV:  chest pain, palpitations, leg swelling  Msk: bodyaches, neck pain, neck stiffness  Skin: rashes, open wounds, nonhealing ulcers  Hem: bleeds easily, bruise easily, immunocompromised  Neuro: dizziness, light headedness, headaches  Psych: anxious, depressed, anger issues  Nuha Chacon PA-C  Hand, Wrist, & Elbow Surgery  Physician Assistant to Dr. [email or web address]  t: [phone]  f: [phone]

## 2024-07-25 DIAGNOSIS — R09.89 LABILE HYPERTENSION: ICD-10-CM

## 2024-07-25 DIAGNOSIS — E78.00 HYPERCHOLESTEROLEMIA: ICD-10-CM

## 2024-07-25 DIAGNOSIS — R09.89 CHRONIC THROAT CLEARING: ICD-10-CM

## 2024-07-25 RX ORDER — ATORVASTATIN CALCIUM 80 MG/1
80 TABLET, FILM COATED ORAL NIGHTLY
Qty: 90 TABLET | Refills: 1 | Status: SHIPPED | OUTPATIENT
Start: 2024-07-25

## 2024-07-25 RX ORDER — LOSARTAN POTASSIUM 50 MG/1
50 TABLET ORAL DAILY
Qty: 90 TABLET | Refills: 1 | Status: SHIPPED | OUTPATIENT
Start: 2024-07-25

## 2024-07-25 NOTE — TELEPHONE ENCOUNTER
Hypertension Medications Protocol Nvqzma1007/25/2024 09:34 AM   Protocol Details CMP or BMP in past 12 months    Last BP reading less than 140/90    In person appointment or virtual visit in the past 12 mos or appointment in next 3 mos    EGFRCR or GFRNAA > 50      Labile hypertension  Continue meds.     Cholesterol Medication Protocol Wwrixq2607/25/2024 09:34 AM   Protocol Details ALT < 80    ALT resulted within past year    Lipid panel within past 12 months    In person appointment or virtual visit in the past 12 mos or appointment in next 3 mos      3. Hypercholesterolemia  Continue statin.      Future Appointments   Date Time Provider Department Center   12/10/2024 12:00 PM Татьяна Horne MD [facility]

## 2024-07-26 DIAGNOSIS — R09.89 LABILE HYPERTENSION: ICD-10-CM

## 2024-07-26 RX ORDER — AMLODIPINE BESYLATE 5 MG/1
TABLET ORAL
Qty: 180 TABLET | Refills: 1 | Status: SHIPPED | OUTPATIENT
Start: 2024-07-26

## 2024-07-26 NOTE — TELEPHONE ENCOUNTER
Patient calling because she didn't get  amLODIPine 5 MG Oral Tab along with her other prescriptions.    Please sent to     [id number] - [place], [place] - [address] AT [facility] [address] [facility], [phone], [phone]   [address] [address]   Phone: [phone] Fax: [phone]

## 2024-07-26 NOTE — TELEPHONE ENCOUNTER
Hypertension Medications Protocol Dtjmhs1507/26/2024 01:27 PM   Protocol Details CMP or BMP in past 12 months    Last BP reading less than 140/90    In person appointment or virtual visit in the past 12 mos or appointment in next 3 mos    EGFRCR or GFRNAA > 50       Labile hypertension  Continue meds.   Future Appointments   Date Time Provider Department Center   12/10/2024 12:00 PM Татьяна Horne MD [facility]

## 2024-09-21 ENCOUNTER — HOSPITAL ENCOUNTER (OUTPATIENT)
Age: 79
Discharge: HOME | End: 2024-09-21
Payer: MEDICARE

## 2024-09-21 VITALS
RESPIRATION RATE: 18 BRPM | DIASTOLIC BLOOD PRESSURE: 94 MMHG | HEART RATE: 75 BPM | SYSTOLIC BLOOD PRESSURE: 128 MMHG | OXYGEN SATURATION: 99 % | TEMPERATURE: 98 F

## 2024-09-21 DIAGNOSIS — N30.01 ACUTE CYSTITIS WITH HEMATURIA: Primary | ICD-10-CM

## 2024-09-21 LAB
GLUCOSE UR STRIP-MCNC: NEGATIVE MG/DL
NITRITE UR QL STRIP: POSITIVE
PH UR STRIP: 7 [PH]
PROT UR STRIP-MCNC: 100 MG/DL
SP GR UR STRIP: 1.02
UROBILINOGEN UR STRIP-ACNC: <2 MG/DL

## 2024-09-21 PROCEDURE — 81002 URINALYSIS NONAUTO W/O SCOPE: CPT | Performed by: NURSE PRACTITIONER

## 2024-09-21 PROCEDURE — 99214 OFFICE O/P EST MOD 30 MIN: CPT | Performed by: NURSE PRACTITIONER

## 2024-09-21 RX ORDER — CEPHALEXIN 500 MG/1
500 CAPSULE ORAL 2 TIMES DAILY
Qty: 14 CAPSULE | Refills: 0 | Status: SHIPPED | OUTPATIENT
Start: 2024-09-21 | End: 2024-09-28

## 2024-09-21 NOTE — ED INITIAL ASSESSMENT (HPI)
Pt c/o urinary urgency and burning w/ urination; Pt states she recently started Teddy-A-Fiber and thus, has been having a lot of bowel movements and believes there may have been some contamination

## 2024-09-21 NOTE — DISCHARGE INSTRUCTIONS
Take antibiotic.  We will call if your antibiotic needs to be changed after culture comes back.  Go to ER with any fever, vomiting or flank pain.

## 2024-09-21 NOTE — ED PROVIDER NOTES
Patient Seen in: [facility]      History     Chief Complaint   Patient presents with    Urinary Symptoms     Stated Complaint: urinary symp    Subjective:   HPI  79-year-old female with arthritis, cancer, hypertension, hyperlipidemia, and hypothyroid presents complaining of urinary symptoms that started yesterday.  She denies any fever, vomiting, or flank pain.    Objective:   Past Medical History:    Arthritis    Belching    Cancer (HCC)    Cataract    Constipation    Diarrhea, unspecified    Essential hypertension    Fallopian tube cancer, carcinoma (HCC)    Stage 1C papillary serous    Flatulence/gas pain/belching    Fracture of right foot    Frequent urination    Hearing loss    Herniated disc    Relieved with KENDALL x 1    High cholesterol    Hyperlipidemia    Hypothyroidism    Irregular bowel habits    L3-4 grade 1 unstable spondylolisthesis    Loss of appetite    Migraine    Nausea    Sleep disturbance    Uncomfortable fullness after meals    Vasovagal syncope    Vomiting    Wears glasses              Past Surgical History:   Procedure Laterality Date    Cataract  ,    Bilateral-then redo on L. Dr. Garcia    Chemotherapy  6 treatments    summer 2015 fallopian tube cancer    Colonoscopy  ?    Glaucoma surgery  ?    Hysterectomy  2015    stage 1c, fallopian tube cancer, Dr. Villa, robotic, [facility]      1967, ,     Oophorectomy      Other surgical history      bilateral cataract surgery    Other surgical history      arthritic cyst removed from finger    Other surgical history      fatty cyst removed from arm                Social History     Socioeconomic History    Marital status:      Spouse name: Conner JuDavis)   Occupational History    Occupation: Homemaker   Tobacco Use    Smoking status: Never    Smokeless tobacco: Never    Tobacco comments:     No 2nd hand exposure   Vaping Use    Vaping status: Never Used   Substance and Sexual Activity    Alcohol  use: Yes     Alcohol/week: 10.0 standard drinks of alcohol     Types: 5 Glasses of wine, 5 Standard drinks or equivalent per week     Comment: 1 glass a wine nightly with dinner    Drug use: No   Other Topics Concern    Caffeine Concern Yes     Comment: coffee    Stress Concern No    Weight Concern No    Special Diet No    Exercise No    Seat Belt Yes   Social History Narrative    The patient does not use an assistive device..      The patient does live in a home with stairs.     Social Determinants of Health      Received from [facility], [facility]    Social Connections    Received from [facility], [facility]    Housing Stability              Review of Systems   All other systems reviewed and are negative.      Positive for stated Chief Complaint: Urinary Symptoms    Other systems are as noted in HPI.  Constitutional and vital signs reviewed.      All other systems reviewed and negative except as noted above.    Physical Exam     ED Triage Vitals [09/21/24 1119]   BP (!) 128/94   Pulse 75   Resp 18   Temp 97.7 °F (36.5 °C)   Temp src Temporal   SpO2 99 %   O2 Device None (Room air)       Current Vitals:   Vital Signs  BP: (!) 128/94  Pulse: 75  Resp: 18  Temp: 97.7 °F (36.5 °C)  Temp src: Temporal    Oxygen Therapy  SpO2: 99 %  O2 Device: None (Room air)            Physical Exam  Vitals and nursing note reviewed.   Constitutional:       General: She is not in acute distress.     Appearance: She is well-developed. She is not ill-appearing or toxic-appearing.   Cardiovascular:      Rate and Rhythm: Normal rate and regular rhythm.      Heart sounds: Normal heart sounds.   Pulmonary:      Effort: Pulmonary effort is normal.      Breath sounds: Normal breath sounds.   Abdominal:      Tenderness: There is no abdominal tenderness. There is no right CVA tenderness or left CVA tenderness.   Skin:     General: Skin is warm and dry.    Neurological:      Mental Status: She is alert and oriented to person, place, and time.               ED Course     Labs Reviewed   [facility] POCT URINALYSIS DIPSTICK - Abnormal; Notable for the following components:       Result Value    Urine Clarity Slightly cloudy (*)     Protein urine 100 (*)     Ketone, Urine Trace (*)     Bilirubin, Urine Small (*)     Blood, Urine Moderate (*)     Nitrite Urine Positive (*)     Leukocyte esterase urine Moderate (*)     All other components within normal limits   URINE CULTURE, ROUTINE                      MDM     Medical Decision Making  79-year-old female with arthritis, cancer, hypertension, hyperlipidemia, and hypothyroid presents complaining of urinary symptoms that started yesterday.  She denies any fever, vomiting, or flank pain.    Pertinent Labs & Imaging studies reviewed. (See chart for details).  Patient coming in with UTI symptoms.   Differential diagnosis includes but not limited to UTI, pyelonephritis, kidney stone  Labs reviewed urine dip with signs of infection.   Will treat for cystitis with urine culture sent.  Will discharge on Keflex. Patient/Parent is comfortable with this plan.    Overall Pt looks good. Non-toxic, well-hydrated and in no respiratory distress. Vital signs are reassuring. Exam is reassuring. I do not believe pt requires and additional diagnostic studies or intervention. I believe pt can be discharged home to continue evaluation as an outpatient. Follow-up provider given. Discharge instructions given and reviewed. Return for any problems. All understand and agree with the plan.        Problems Addressed:  Acute cystitis with hematuria: acute illness or injury        Disposition and Plan     Clinical Impression:  1. Acute cystitis with hematuria         Disposition:  Discharge  9/21/2024 11:38 am    Follow-up:  No follow-up provider specified.        Medications Prescribed:  Current Discharge Medication List        START taking these medications     Details   cephALEXin 500 MG Oral Cap Take 1 capsule (500 mg total) by mouth 2 (two) times daily for 7 days.  Qty: 14 capsule, Refills: 0

## 2024-10-24 DIAGNOSIS — K64.4 EXTERNAL HEMORRHOID: ICD-10-CM

## 2024-10-25 RX ORDER — HYDROCORTISONE 25 MG/G
1 CREAM TOPICAL 2 TIMES DAILY
Qty: 30 G | Refills: 0 | Status: SHIPPED | OUTPATIENT
Start: 2024-10-25

## 2024-10-25 NOTE — TELEPHONE ENCOUNTER
Gastrointestional Medication Protocol [facility]18/24/2024 10:49 AM   Protocol Details In person appointment or virtual visit in the past 12 mos or appointment in next 3 mos        Future Appointments   Date Time Provider Department Center   12/10/2024 12:00 PM Татьяна Horne MD [facility] [place]

## 2024-12-06 ENCOUNTER — LAB ENCOUNTER (OUTPATIENT)
Dept: LAB | Age: 79
End: 2024-12-06
Attending: INTERNAL MEDICINE
Payer: MEDICARE

## 2024-12-12 ENCOUNTER — OFFICE VISIT (OUTPATIENT)
Dept: INTERNAL MEDICINE CLINIC | Facility: CLINIC | Age: 79
End: 2024-12-12
Payer: MEDICARE

## 2024-12-12 ENCOUNTER — HOSPITAL ENCOUNTER (OUTPATIENT)
Dept: GENERAL RADIOLOGY | Age: 79
Discharge: HOME | End: 2024-12-12
Attending: NURSE PRACTITIONER
Payer: MEDICARE

## 2024-12-12 VITALS
OXYGEN SATURATION: 98 % | DIASTOLIC BLOOD PRESSURE: 50 MMHG | WEIGHT: 172.81 LBS | HEART RATE: 77 BPM | HEIGHT: 65 IN | TEMPERATURE: 97 F | BODY MASS INDEX: 28.79 KG/M2 | SYSTOLIC BLOOD PRESSURE: 92 MMHG | RESPIRATION RATE: 16 BRPM

## 2024-12-12 DIAGNOSIS — M25.562 ACUTE PAIN OF LEFT KNEE: ICD-10-CM

## 2024-12-12 DIAGNOSIS — M25.561 CHRONIC PAIN OF RIGHT KNEE: ICD-10-CM

## 2024-12-12 DIAGNOSIS — M25.562 ACUTE PAIN OF LEFT KNEE: Primary | ICD-10-CM

## 2024-12-12 DIAGNOSIS — G89.29 CHRONIC PAIN OF RIGHT KNEE: ICD-10-CM

## 2024-12-12 PROCEDURE — 1170F FXNL STATUS ASSESSED: CPT | Performed by: NURSE PRACTITIONER

## 2024-12-12 PROCEDURE — 3074F SYST BP LT 130 MM HG: CPT | Performed by: NURSE PRACTITIONER

## 2024-12-12 PROCEDURE — 99214 OFFICE O/P EST MOD 30 MIN: CPT | Performed by: NURSE PRACTITIONER

## 2024-12-12 PROCEDURE — 1159F MED LIST DOCD IN RCRD: CPT | Performed by: NURSE PRACTITIONER

## 2024-12-12 PROCEDURE — 3008F BODY MASS INDEX DOCD: CPT | Performed by: NURSE PRACTITIONER

## 2024-12-12 PROCEDURE — 73564 X-RAY EXAM KNEE 4 OR MORE: CPT | Performed by: NURSE PRACTITIONER

## 2024-12-12 PROCEDURE — G2211 COMPLEX E/M VISIT ADD ON: HCPCS | Performed by: NURSE PRACTITIONER

## 2024-12-12 PROCEDURE — 3078F DIAST BP <80 MM HG: CPT | Performed by: NURSE PRACTITIONER

## 2024-12-12 RX ORDER — METHYLPREDNISOLONE 4 MG/1
TABLET ORAL
Qty: 1 EACH | Refills: 0 | Status: SHIPPED | OUTPATIENT
Start: 2024-12-12

## 2024-12-12 NOTE — PROGRESS NOTES
CHIEF COMPLAINT:     Chief Complaint   Patient presents with    Knee Pain     Since 11/27/24 -   Started with a very sharp pain in the back of left knee while walking, been a dull ache since -10/10 at it's worst  Ibuprofen and elevated directly after left knee injury    No injury to right knee, just general pain and swelling for the past 3-4 months, sometimes grinds. 6/10 at it's worst       HPI:   Yi Diaz is a 79 year old female coming in with complaints of bilateral knee pain.    Left knee pain started about 2 weeks ago. Pain started suddenly while she was walking. Pain is located to the back and lateral part of the knee. She did initially have swelling around the knee and the ankle but that has since subsided. She has been applying knee brace and elevating it. Taking ibuprofen with limited relief. Denies any numbness/tingling to the LLE or weakness.     Has pain to the right lateral knee for about 4 months which has gotten worse. No known injury or trauma. Pain is worse when she is going up and down the stairs. No numbness/tingling to the RLE or weakness.     Past Medical History:    Arthritis    Belching    Cancer (HCC)    Cataract    Constipation    Diarrhea, unspecified    Essential hypertension    Fallopian tube cancer, carcinoma (HCC)    Stage 1C papillary serous    Flatulence/gas pain/belching    Fracture of right foot    Frequent urination    Hearing loss    Herniated disc    Relieved with KENDALL x 1    High cholesterol    Hyperlipidemia    Hypothyroidism    Irregular bowel habits    L3-4 grade 1 unstable spondylolisthesis    Loss of appetite    Migraine    Nausea    Sleep disturbance    Uncomfortable fullness after meals    Vasovagal syncope    Vomiting    Wears glasses      Past Surgical History:   Procedure Laterality Date    Cataract  2010,2011    Bilateral-then redo on LParul Garcia    Chemotherapy  6 treatments    summer 2015 fallopian tube cancer    Colonoscopy  ?    Glaucoma surgery  ?     Hysterectomy  2015    stage 1c, fallopian tube cancer, Dr. Villa, robotic, [facility]      1967, ,     Oophorectomy      Other surgical history      bilateral cataract surgery    Other surgical history      arthritic cyst removed from finger    Other surgical history      fatty cyst removed from arm      Social History:  Social History     Socioeconomic History    Marital status:      Spouse name: Conner Roberts)   Occupational History    Occupation: Homemaker   Tobacco Use    Smoking status: Never    Smokeless tobacco: Never    Tobacco comments:     No 2nd hand exposure   Vaping Use    Vaping status: Never Used   Substance and Sexual Activity    Alcohol use: Yes     Alcohol/week: 10.0 standard drinks of alcohol     Types: 5 Glasses of wine, 5 Standard drinks or equivalent per week     Comment: 1 glass a wine nightly with dinner    Drug use: No   Other Topics Concern    Caffeine Concern Yes     Comment: coffee    Stress Concern No    Weight Concern No    Special Diet No    Exercise No    Seat Belt Yes   Social History Narrative    The patient does not use an assistive device..      The patient does live in a home with stairs.     Social Drivers of Health      Received from [facility], [facility]    Social Connections    Received from [facility], [facility]    Housing Stability      Family History:  Family History   Problem Relation Age of Onset    Other (Heart failure) Mother          at 76    Stroke Father          at 43 from cerebral hemorrhage    Hypertension Father         Due to kidney failure    Other (Other) Father         kidney disease    Heart Disorder Maternal Grandfather     Other (Diverticulitis) Other         , CHF, AF      Allergies:  Allergies[1]   Current Meds:  Current Outpatient Medications   Medication Sig Dispense Refill    methylPREDNISolone (MEDROL) 4 MG Oral Tablet Therapy Pack As  directed. 1 each 0    pantoprazole 20 MG Oral Tab EC Take 1 tablet (20 mg total) by mouth every morning before breakfast. (Patient taking differently: Take 1 tablet (20 mg total) by mouth every morning before breakfast. Takes PRN) 30 tablet 1    HYDROCORTISONE 2.5 % External Cream APPLY RECTALLY TO THE AFFECTED AREA TWICE DAILY 30 g 0    amLODIPine 5 MG Oral Tab TAKE 1 TABLET BY MOUTH IN THE MORNING AND AT NIGHT 180 tablet 1    LOSARTAN 50 MG Oral Tab TAKE 1 TABLET(50 MG) BY MOUTH DAILY 90 tablet 1    ATORVASTATIN 80 MG Oral Tab TAKE 1 TABLET(80 MG) BY MOUTH EVERY NIGHT 90 tablet 1    diclofenac 1 % External Gel Apply 2 g topically 4 (four) times daily as needed. Generic ok 100 g 1    Cyanocobalamin (B-12 OR) Take by mouth daily.      LEVOTHYROXINE 75 MCG Oral Tab TAKE 1 TABLET(75 MCG) BY MOUTH BEFORE BREAKFAST 90 tablet 3    azelastine 0.1 % Nasal Solution 2 sprays by Nasal route daily as needed.      acetaminophen 500 MG Oral Tab Take 1 tablet (500 mg total) by mouth every 6 (six) hours as needed for Pain. As needed      Cholecalciferol (VITAMIN D OR) Take 1,000 Units by mouth every morning.           Counseling given: Not Answered  Tobacco comments: No 2nd hand exposure       REVIEW OF SYSTEMS:   See HPI.    EXAM:     BP 92/50 (BP Location: Left arm, Patient Position: Sitting, Cuff Size: adult)   Pulse 77   Temp 97.3 °F (36.3 °C) (Temporal)   Resp 16   Ht 5' 5\" (1.651 m)   Wt 172 lb 12.8 oz (78.4 kg)   SpO2 98%   BMI 28.76 kg/m²   Body mass index is 28.76 kg/m².   Vital signs reviewed. Appears stated age, well groomed, in no acute distress.  Physical Exam:  GENERAL: Patient is alert, awake and oriented, well developed, well nourished.  HEENT: Head: Normocephalic, atraumatic.   MUSCULOSKELETAL: No obvious joint deformity or swelling.   EXTREMITIES: There is mild TTP to bilateral anterior lateral knees, no erythema or swelling. FROM without pain but causes tightness. No edema, no cyanosis, no  clubbing  NEURO: Oriented time three.     LABS:      Lab Results   Component Value Date    WBC 8.8 06/10/2024    RBC 4.18 06/10/2024    HGB 13.0 06/10/2024    HCT 38.1 06/10/2024    MCV 91.1 06/10/2024    MCH 31.1 06/10/2024    MCHC 34.1 06/10/2024    RDW 12.8 06/10/2024    .0 06/10/2024      Lab Results   Component Value Date    GLU 97 05/30/2024    BUN 12 06/10/2024    BUNCREA 14.9 04/19/2021    CREATSERUM 0.88 06/10/2024    ANIONGAP 4 05/30/2024    GFR 79 09/19/2017    GFRNAA 74 05/14/2022    GFRAA 85 05/14/2022    CA 9.2 05/30/2024    OSMOCALC 287 05/30/2024    ALKPHO 83 05/30/2024    AST 26 05/30/2024    ALT 27 05/30/2024    BILT 0.5 05/30/2024    TP 7.7 05/30/2024    ALB 3.7 05/30/2024    GLOBULIN 4.0 05/30/2024     05/30/2024    K 3.8 05/30/2024     05/30/2024    CO2 29.0 05/30/2024      Lab Results   Component Value Date    CHOLEST 171 05/30/2024    TRIG 51 05/30/2024    HDL 86 (H) 05/30/2024    LDL 75 05/30/2024    VLDL 8 05/30/2024    TCHDLRATIO 1.82 03/02/2018    NONHDLC 85 05/30/2024      Lab Results   Component Value Date    TSH 2.610 05/30/2024      Lab Results   Component Value Date     05/30/2024    A1C 6.0 (H) 05/30/2024        IMAGING:     No results found.     ASSESSMENT AND PLAN:   1. Acute pain of left knee  2. Chronic pain of right knee  -Do x-rays of bilateral knees  -Start medrol pack, SE discussed. Avoid NSAIDs while taking medrol pack  -Can take tylenol prn  -Alternate between heat/ice therapy  -Avoid strenuous activity  -Start PT and see ortho.   - XR Knee (non-replaced) left complete (4 or more views)- [facility] Orth[facility] Consult Only; Future  - XR Knee (non-replaced) right complete (4 or more views) - [facility] Ortho Consult Only; Future  - ORTHOPEDIC - INTERNAL  - Physical Therapy Referral - [facility] Location  - methylPREDNISolone (MEDROL) 4 MG Oral Tablet Therapy Pack; As directed.  Dispense: 1 each; Refill: 0     The patient indicates understanding of these issues and agrees  to the plan.  Return if symptoms worsen or fail to improve.    Andie Love, APRN  12/12/2024         [1]   Allergies  Allergen Reactions    Crabs (Crustaceans)      vomitting and diarrhea    Myrbetriq [Mirabegron] OTHER (SEE COMMENTS)     Leg swelling    Shrimp DIARRHEA

## 2024-12-18 ENCOUNTER — OFFICE VISIT (OUTPATIENT)
Dept: ORTHOPEDICS CLINIC | Facility: CLINIC | Age: 79
End: 2024-12-18
Payer: MEDICARE

## 2024-12-18 VITALS — HEIGHT: 65 IN | WEIGHT: 172 LBS | BODY MASS INDEX: 28.66 KG/M2

## 2024-12-18 DIAGNOSIS — M25.462 EFFUSION OF LEFT KNEE: ICD-10-CM

## 2024-12-18 DIAGNOSIS — M17.12 PRIMARY OSTEOARTHRITIS OF LEFT KNEE: Primary | ICD-10-CM

## 2024-12-18 DIAGNOSIS — M17.11 PRIMARY OSTEOARTHRITIS OF RIGHT KNEE: ICD-10-CM

## 2024-12-18 PROCEDURE — 1159F MED LIST DOCD IN RCRD: CPT | Performed by: ORTHOPAEDIC SURGERY

## 2024-12-18 PROCEDURE — 99204 OFFICE O/P NEW MOD 45 MIN: CPT | Performed by: ORTHOPAEDIC SURGERY

## 2024-12-18 PROCEDURE — 1160F RVW MEDS BY RX/DR IN RCRD: CPT | Performed by: ORTHOPAEDIC SURGERY

## 2024-12-18 PROCEDURE — 3008F BODY MASS INDEX DOCD: CPT | Performed by: ORTHOPAEDIC SURGERY

## 2024-12-18 NOTE — PROGRESS NOTES
[facility] New Consult    Chief Complaint   Patient presents with    Knee Pain     BILATERAL KNEE PAIN, ONSET: 2024  -Left knee is more bothersome  -Was walking and felt pain in the back of the left knee      HPI: The patient is a 79 year old female referred for orthopaedic consultation by RAYNA Stephenson with complaints of acute posterior left knee pain and stiffness.  No injury is reported but she is experiencing intermittent click and crepitus, mild suspected swelling and diminishing tolerance of prolonged standing and walking..  She felt pain at the posterior aspect of this left knee around the Thanksgiving holiday.  There is report of chronic pain at the lateral aspect of the right knee which is typically her more bothersome side.  Intermittent use of ibuprofen has not resulted in substantial relief.    Past Medical History:    Arthritis    Belching    Cancer (HCC)    Cataract    Constipation    Diarrhea, unspecified    Essential hypertension    Fallopian tube cancer, carcinoma (HCC)    Stage 1C papillary serous    Flatulence/gas pain/belching    Fracture of right foot    Frequent urination    Hearing loss    Herniated disc    Relieved with KENDALL x 1    High cholesterol    Hyperlipidemia    Hypothyroidism    Irregular bowel habits    L3-4 grade 1 unstable spondylolisthesis    Loss of appetite    Migraine    Nausea    Sleep disturbance    Uncomfortable fullness after meals    Vasovagal syncope    Vomiting    Wears glasses     Past Surgical History:   Procedure Laterality Date    Cataract  ,    Bilateral-then redo on L. Dr. Garcia    Chemotherapy  6 treatments    summer 2015 fallopian tube cancer    Colonoscopy  ?    Glaucoma surgery  ?    Hysterectomy  2015    stage 1c, fallopian tube cancer, Dr. Villa, robotic, [facility]      1967, ,     Oophorectomy      Other surgical history      bilateral cataract surgery    Other surgical history      arthritic cyst  removed from finger    Other surgical history      fatty cyst removed from arm     Current Outpatient Medications   Medication Sig Dispense Refill    methylPREDNISolone (MEDROL) 4 MG Oral Tablet Therapy Pack As directed. 1 each 0    HYDROCORTISONE 2.5 % External Cream APPLY RECTALLY TO THE AFFECTED AREA TWICE DAILY 30 g 0    amLODIPine 5 MG Oral Tab TAKE 1 TABLET BY MOUTH IN THE MORNING AND AT NIGHT 180 tablet 1    LOSARTAN 50 MG Oral Tab TAKE 1 TABLET(50 MG) BY MOUTH DAILY 90 tablet 1    ATORVASTATIN 80 MG Oral Tab TAKE 1 TABLET(80 MG) BY MOUTH EVERY NIGHT 90 tablet 1    diclofenac 1 % External Gel Apply 2 g topically 4 (four) times daily as needed. Generic ok 100 g 1    Cyanocobalamin (B-12 OR) Take by mouth daily.      pantoprazole 20 MG Oral Tab EC Take 1 tablet (20 mg total) by mouth every morning before breakfast. (Patient taking differently: Take 1 tablet (20 mg total) by mouth every morning before breakfast. Takes PRN) 30 tablet 1    LEVOTHYROXINE 75 MCG Oral Tab TAKE 1 TABLET(75 MCG) BY MOUTH BEFORE BREAKFAST 90 tablet 3    azelastine 0.1 % Nasal Solution 2 sprays by Nasal route daily as needed.      acetaminophen 500 MG Oral Tab Take 1 tablet (500 mg total) by mouth every 6 (six) hours as needed for Pain. As needed      Cholecalciferol (VITAMIN D OR) Take 1,000 Units by mouth every morning.         Allergies[1]  Family History   Problem Relation Age of Onset    Other (Heart failure) Mother          at 76    Stroke Father          at 43 from cerebral hemorrhage    Hypertension Father         Due to kidney failure    Other (Other) Father         kidney disease    Heart Disorder Maternal Grandfather     Other (Diverticulitis) Other         , CHF, AF     Social History     Occupational History    Occupation: Homemaker   Tobacco Use    Smoking status: Never    Smokeless tobacco: Never    Tobacco comments:     No 2nd hand exposure   Vaping Use    Vaping status: Never Used   Substance and Sexual  Activity    Alcohol use: Yes     Alcohol/week: 10.0 standard drinks of alcohol     Types: 5 Glasses of wine, 5 Standard drinks or equivalent per week     Comment: 1 glass a wine nightly with dinner    Drug use: No    Sexual activity: Not on file        ROS:  Complete ROS reviewed by me and non-contributory to the chief complaint except as mentioned above.    Physical Exam:    Ht 5' 5\" (1.651 m)   Wt 172 lb (78 kg)   BMI 28.62 kg/m²   Constitutional: Well developed, well nourished 79 year old female  Psychological: NAD, alert and appropriate  Respiratory: Breathing comfortably on room air with RR of 10-14  Cardiac: Palpable distal pulses with pink warm extremities distally  Left knee: Inspection reveals no significant discoloration nor deformity.  Palpation reveals trace effusion on the left with none on the right.  Range of motion is adequate with no significant flexion contracture and adequate flexion to at least 125 degrees.  Medial joint line is tender on the left or the lateral side is more tender on the right.  Collaterals are stable on varus valgus stress.  Lachman and posterior drawer are negative.  Popliteal space is nontender.  No significant distal edema is noted.  Neurovascular status is intact on sensory, motor and perfusion assessment distally.    Imaging:  XR KNEE, COMPLETE (4 OR MORE VIEWS), LEFT (CPT=73564)    Result Date: 12/12/2024  CONCLUSION:  1. Tricompartmental osteoarthritis which is relatively mild in degree and most pronounced in the medial compartment. 2. Mild to moderate suprapatellar joint effusion.   LOCATION:  [facility]   Dictated by (CST): Savage Julio DO on 12/12/2024 at 2:16 PM     Finalized by (CST): Savage Julio DO on 12/12/2024 at 2:17 PM       XR KNEE, COMPLETE (4 OR MORE VIEWS), RIGHT (CPT=73564)    Result Date: 12/12/2024  CONCLUSION:  1. Tricompartmental osteoarthritis which is severe in the lateral compartment where there is significant joint space loss and mild genu valgus.  2. Ossified intra-articular bodies, similar in appearance to previous imaging. 3. Mild joint effusion.   LOCATION:  [facility]   Dictated by (CST): Savage Julio DO on 12/12/2024 at 2:14 PM     Finalized by (CST): Savage Julio DO on 12/12/2024 at 2:16 PM        Multiple views right and left knees personally viewed, independently interpreted and radiology report read.  The most severe joint space narrowing and arthritic changes seen to the lateral compartment of the right knee.  Moderate findings are seen at the medial compartment of the left knee.      Assessment/Diagnoses:    Diagnoses and all orders for this visit:    Primary osteoarthritis of left knee    Primary osteoarthritis of right knee    Effusion of left knee      Plan:  I reviewed imaging and exam findings with the patient.  The diagnosis is degenerative joint disease and mild effusion of the left knee most likely due to early degenerative joint disease at the medial compartment.  Ironically, the right knee shows greater arthritic change at the lateral compartment where she is only minimally symptomatic albeit chronically.  We discussed the etiology, natural history and treatment options in detail.  Treatments include activity modification, weight loss, anti-inflammatory use and possible injections.  In the long term, the patient's symptoms may progress and warrant consideration of total knee arthroplasty, but only if symptoms become severe.  For now, non-surgical treatment options are recommended and preferred.  We discussed the option for knee corticosteroid injection along with the potential risks, benefits and alternatives.  In light of manageable symptoms, the patient elects to proceed off for now.  Topical modalities including light compression, ice and perhaps Voltaren gel may provide low risk treatment options to supplement intermittent use of oral anti-inflammatories.  All questions were answered and the patient verbalized understanding and  appreciation.    Maddi Stafford MD, FAAOS  Orthopaedic Surgery   Sports Medicine/Knee and Shoulder  [facility]  t: [phone]  f: [phone]           This document was partially prepared using Dragon Medical voice recognition software.  Although every attempt is made to correct errors during dictation, discrepancies may still exist.               [1]   Allergies  Allergen Reactions    Crabs (Crustaceans)      vomitting and diarrhea    Myrbetriq [Mirabegron] OTHER (SEE COMMENTS)     Leg swelling    Shrimp DIARRHEA

## 2025-01-16 DIAGNOSIS — R09.89 LABILE HYPERTENSION: ICD-10-CM

## 2025-01-16 DIAGNOSIS — E78.00 HYPERCHOLESTEROLEMIA: ICD-10-CM

## 2025-01-16 DIAGNOSIS — M25.562 ACUTE PAIN OF LEFT KNEE: ICD-10-CM

## 2025-01-16 DIAGNOSIS — E03.9 HYPOTHYROIDISM, UNSPECIFIED TYPE: ICD-10-CM

## 2025-01-16 DIAGNOSIS — R09.89 CHRONIC THROAT CLEARING: ICD-10-CM

## 2025-01-16 DIAGNOSIS — G89.29 CHRONIC PAIN OF RIGHT KNEE: ICD-10-CM

## 2025-01-16 DIAGNOSIS — M25.561 CHRONIC PAIN OF RIGHT KNEE: ICD-10-CM

## 2025-01-16 RX ORDER — ATORVASTATIN CALCIUM 80 MG/1
80 TABLET, FILM COATED ORAL NIGHTLY
Qty: 90 TABLET | Refills: 0 | Status: SHIPPED | OUTPATIENT
Start: 2025-01-16

## 2025-01-16 RX ORDER — LOSARTAN POTASSIUM 50 MG/1
50 TABLET ORAL DAILY
Qty: 90 TABLET | Refills: 0 | Status: SHIPPED | OUTPATIENT
Start: 2025-01-16

## 2025-01-16 RX ORDER — LEVOTHYROXINE SODIUM 75 UG/1
75 TABLET ORAL
Qty: 30 TABLET | Refills: 0 | Status: SHIPPED | OUTPATIENT
Start: 2025-01-16

## 2025-01-16 NOTE — TELEPHONE ENCOUNTER
Hypertension Medications Protocol [place]1901/16/2025 11:25 AM   Protocol Details CMP or BMP in past 12 months    Last BP reading less than 140/90    In person appointment or virtual visit in the past 12 mos or appointment in next 3 mos    EGFRCR or GFRNAA > 50    Medication is active on med list      2. Labile hypertension  Continue meds.  Cholesterol Medication Protocol [place]5501/16/2025 11:25 AM   Protocol Details ALT < 80    ALT resulted within past year    Lipid panel within past 12 months    In person appointment or virtual visit in the past 12 mos or appointment in next 3 mos    Medication is active on med list   . Hypercholesterolemia  Continue statin.      Future Appointments   Date Time Provider Department Center   1/31/2025  8:40 AM Татьяна Horne MD [facility] [place]   2/17/2025 10:00 AM [facility]       
self-care/home management

## 2025-01-16 NOTE — TELEPHONE ENCOUNTER
Thyroid Medication Protocol [id number]/16/2025 02:24 PM   Protocol Details TSH in past 12 months    Last TSH value is normal    In person appointment or virtual visit in the past 12 mos or appointment in next 3 mos    Medication is active on med list       Future Appointments   Date Time Provider Department Center   1/31/2025  8:40 AM Татьяна Horne MD [facility] [place]   2/17/2025 10:00 AM [facility]

## 2025-01-17 RX ORDER — METHYLPREDNISOLONE 4 MG/1
TABLET ORAL
Qty: 1 EACH | Refills: 0 | OUTPATIENT
Start: 2025-01-17

## 2025-01-17 NOTE — TELEPHONE ENCOUNTER
Per Andie, she advised pt should reach out to ortho to see what they recommend and if they can send steroids for her.

## 2025-01-21 DIAGNOSIS — R09.89 LABILE HYPERTENSION: ICD-10-CM

## 2025-01-21 RX ORDER — AMLODIPINE BESYLATE 5 MG/1
TABLET ORAL
Qty: 180 TABLET | Refills: 0 | Status: SHIPPED | OUTPATIENT
Start: 2025-01-21

## 2025-01-21 NOTE — TELEPHONE ENCOUNTER
Last office visit: 6/10/2024   Protocol: pass  Requested medication(s) are due for refill today: yes  Requested medication(s) are on the active medication list same strength, form, dose/ sig: yes  Requested medication(s) are managed by provider: yes  Patient has already received a courtsey refill: no     NOV: 1/31/2025  Last Labs: 6/10/2024  Asked to Return: 12/10/2024

## 2025-01-23 DIAGNOSIS — K21.9 GASTROESOPHAGEAL REFLUX DISEASE, UNSPECIFIED WHETHER ESOPHAGITIS PRESENT: ICD-10-CM

## 2025-01-23 RX ORDER — PANTOPRAZOLE SODIUM 20 MG/1
20 TABLET, DELAYED RELEASE ORAL
Qty: 90 TABLET | Refills: 0 | Status: SHIPPED | OUTPATIENT
Start: 2025-01-23

## 2025-01-31 ENCOUNTER — OFFICE VISIT (OUTPATIENT)
Dept: INTERNAL MEDICINE CLINIC | Facility: CLINIC | Age: 80
End: 2025-01-31
Payer: MEDICARE

## 2025-01-31 ENCOUNTER — LAB ENCOUNTER (OUTPATIENT)
Dept: LAB | Age: 80
End: 2025-01-31
Attending: INTERNAL MEDICINE
Payer: COMMERCIAL

## 2025-01-31 VITALS
HEART RATE: 76 BPM | BODY MASS INDEX: 28.55 KG/M2 | WEIGHT: 169.31 LBS | TEMPERATURE: 97 F | DIASTOLIC BLOOD PRESSURE: 70 MMHG | HEIGHT: 64.5 IN | RESPIRATION RATE: 16 BRPM | SYSTOLIC BLOOD PRESSURE: 112 MMHG

## 2025-01-31 DIAGNOSIS — G57.31 SUPERFICIAL PERONEAL NERVE NEUROPATHY, RIGHT: ICD-10-CM

## 2025-01-31 DIAGNOSIS — R09.89 LABILE HYPERTENSION: ICD-10-CM

## 2025-01-31 DIAGNOSIS — R42 LIGHTHEADEDNESS: ICD-10-CM

## 2025-01-31 DIAGNOSIS — E03.9 HYPOTHYROIDISM, UNSPECIFIED TYPE: ICD-10-CM

## 2025-01-31 DIAGNOSIS — N32.81 OAB (OVERACTIVE BLADDER): ICD-10-CM

## 2025-01-31 DIAGNOSIS — E78.00 HYPERCHOLESTEROLEMIA: ICD-10-CM

## 2025-01-31 DIAGNOSIS — R73.03 PRE-DIABETES: ICD-10-CM

## 2025-01-31 DIAGNOSIS — K21.9 GASTROESOPHAGEAL REFLUX DISEASE, UNSPECIFIED WHETHER ESOPHAGITIS PRESENT: ICD-10-CM

## 2025-01-31 DIAGNOSIS — M25.561 PAIN IN BOTH KNEES, UNSPECIFIED CHRONICITY: ICD-10-CM

## 2025-01-31 DIAGNOSIS — E78.00 HYPERCHOLESTEROLEMIA: Primary | ICD-10-CM

## 2025-01-31 DIAGNOSIS — M25.562 PAIN IN BOTH KNEES, UNSPECIFIED CHRONICITY: ICD-10-CM

## 2025-01-31 LAB
ALBUMIN SERPL-MCNC: 4.3 G/DL (ref 3.2–4.8)
ALBUMIN/GLOB SERPL: 1.2 {RATIO} (ref 1–2)
ALP LIVER SERPL-CCNC: 77 U/L
ALT SERPL-CCNC: 16 U/L
ANION GAP SERPL CALC-SCNC: 9 MMOL/L (ref 0–18)
AST SERPL-CCNC: 23 U/L (ref ?–34)
BILIRUB SERPL-MCNC: 0.6 MG/DL (ref 0.2–1.1)
BUN BLD-MCNC: 15 MG/DL (ref 9–23)
CALCIUM BLD-MCNC: 9.9 MG/DL (ref 8.7–10.6)
CHLORIDE SERPL-SCNC: 102 MMOL/L (ref 98–112)
CO2 SERPL-SCNC: 28 MMOL/L (ref 21–32)
CREAT BLD-MCNC: 0.95 MG/DL
EGFRCR SERPLBLD CKD-EPI 2021: 61 ML/MIN/1.73M2 (ref 60–?)
FASTING STATUS PATIENT QL REPORTED: YES
GLOBULIN PLAS-MCNC: 3.6 G/DL (ref 2–3.5)
GLUCOSE BLD-MCNC: 97 MG/DL (ref 70–99)
OSMOLALITY SERPL CALC.SUM OF ELEC: 289 MOSM/KG (ref 275–295)
POTASSIUM SERPL-SCNC: 3.8 MMOL/L (ref 3.5–5.1)
PROT SERPL-MCNC: 7.9 G/DL (ref 5.7–8.2)
SODIUM SERPL-SCNC: 139 MMOL/L (ref 136–145)

## 2025-01-31 PROCEDURE — 99214 OFFICE O/P EST MOD 30 MIN: CPT | Performed by: INTERNAL MEDICINE

## 2025-01-31 PROCEDURE — 80061 LIPID PANEL: CPT | Performed by: INTERNAL MEDICINE

## 2025-01-31 PROCEDURE — G2211 COMPLEX E/M VISIT ADD ON: HCPCS | Performed by: INTERNAL MEDICINE

## 2025-01-31 NOTE — PROGRESS NOTES
[facility]    CHIEF COMPLAINT:    Chief Complaint   Patient presents with    Medication Follow-Up     2/15/24-mammo.  6/9/16-colon-no further         HISTORY OF PRESENT ILLNESS:  here for med check.   Htn: Taking meds regularly. No chest pain, no shortness of breath.      Hyperlipidemia: on statin. No muscle aches.       Hypothyroid: on levothyroxine. No fatigue or palpitations.      Pre diabetes: working on low carb diet.      Decreased exercise tolerance: had stress test and echo. Has not seen cardiology.   She states she can really walk fine. She doesn't have the decreased exercise tolerance any more.   She feels lightheaded only when she stands for some time. Notices at [facility] when she stands or knees. Doesn't have the symptoms at home but doesn't really stand for a long time at home. No problem with walking.     Saw gi for diarrhea and constipation. She is managing this conservatively.      Oab: managing it conservatively. Doesn't really want to go see urogyne unless thinks worsen.      Saw dr. Stafford for knee pain. She will be doing PT. She would like to do this at [facility]. Needs referral printed.     Complains of numb spot on her right mid shin for a few months. No tingling. Has not increased or decreased. No weakness. No leg pain..       REVIEW OF SYSTEMS:  See HPI    Current Medications:    Current Outpatient Medications   Medication Sig Dispense Refill    pantoprazole 20 MG Oral Tab EC TAKE 1 TABLET(20 MG) BY MOUTH EVERY MORNING BEFORE BREAKFAST 90 tablet 0    amLODIPine 5 MG Oral Tab TAKE 1 TABLET BY MOUTH IN THE MORNING AND AT NIGHT 180 tablet 0    losartan 50 MG Oral Tab TAKE 1 TABLET(50 MG) BY MOUTH DAILY 90 tablet 0    atorvastatin 80 MG Oral Tab TAKE 1 TABLET(80 MG) BY MOUTH EVERY NIGHT 90 tablet 0    levothyroxine 75 MCG Oral Tab TAKE 1 TABLET(75 MCG) BY MOUTH BEFORE BREAKFAST 30 tablet 0    HYDROCORTISONE 2.5 % External Cream APPLY RECTALLY TO THE AFFECTED AREA TWICE DAILY (Patient taking  differently: 1 Application 2 (two) times daily. Using as needed) 30 g 0    diclofenac 1 % External Gel Apply 2 g topically 4 (four) times daily as needed. Generic ok 100 g 1    Cyanocobalamin (B-12 OR) Take by mouth daily.      azelastine 0.1 % Nasal Solution 2 sprays by Nasal route daily as needed.      acetaminophen 500 MG Oral Tab Take 1 tablet (500 mg total) by mouth every 6 (six) hours as needed for Pain. As needed      Cholecalciferol (VITAMIN D OR) Take 1,000 Units by mouth every morning.           PAST MEDICAL, SOCIAL, AND FAMILY HISTORY:  Tobacco:    History   Smoking Status    Never   Smokeless Tobacco    Never       PHYSICAL EXAM:  /70 (BP Location: Left arm, Patient Position: Sitting, Cuff Size: adult)   Pulse 76   Temp 97.4 °F (36.3 °C) (Temporal)   Resp 16   Ht 5' 4.5\" (1.638 m)   Wt 169 lb 4.8 oz (76.8 kg)   Breastfeeding No   BMI 28.61 kg/m²    GENERAL: well developed, well nourished,in no apparent distress  LUNGS: clear to auscultation  CARDIO: RRR without murmur  GI: good BS's,no masses, HSM or tenderness  MUSCULOSKELETAL: back is not tender,FROM of the back  EXTREMITIES:  no edema, muscle strength normal. Numbness on the right shin just a small area. Pulses normal in feet. No rash. No swelling.     DATA:  Results for orders placed or performed during the hospital encounter of 09/21/24   POCT Urinalysis Dipstick    Collection Time: 09/21/24 11:31 AM   Result Value Ref Range    Urine Color Dark yellow Yellow    Urine Clarity Slightly cloudy (A) Clear    Specific Gravity, Urine 1.020 1.005 - 1.030    PH, Urine 7.0 5.0 - 8.0    Protein urine 100 (A) Negative mg/dL    Glucose, Urine Negative Negative mg/dL    Ketone, Urine Trace (A) Negative mg/dL    Bilirubin, Urine Small (A) Negative    Blood, Urine Moderate (A) Negative    Nitrite Urine Positive (A) Negative    Urobilinogen urine <2.0 <2.0 mg/dL    Leukocyte esterase urine Moderate (A) Negative   Urine Culture, Routine    Collection  Time: 09/21/24 11:39 AM    Specimen: Urine, clean catch   Result Value Ref Range    Urine Culture >100,000 CFU/ML Escherichia coli (A)        Susceptibility    Escherichia coli -  (no method available)     Ampicillin <=2 Sensitive      Cefazolin <=4 Sensitive      Ciprofloxacin <=0.25 Sensitive      Gentamicin <=1 Sensitive      Meropenem <=0.25 Sensitive      Levofloxacin <=0.12 Sensitive      Nitrofurantoin <=16 Sensitive      Piperacillin + Tazobactam <=4 Sensitive      Trimethoprim/Sulfa <=20 Sensitive             ASSESSMENT AND PLAN:  1. Labile hypertension  Continue meds.   She gets lightheaded when she stands. Check bp at home and send me readings. If low will cut down on bp meds.     2. Hypercholesterolemia  Continue statin.   - Comp Metabolic Panel (14) [E]; Future    3. Hypothyroidism, unspecified type  Continue meds.     4. Pre-diabetes  Continue diet control.     5. Lightheadedness  Will have her check bp at home.   She only has this in [place] when she stands or kneels.   No symptoms when walking or at other times at home.   No palpitations.     6. Gastroesophageal reflux disease, unspecified whether esophagitis present  follow up with gi.     7. OAB (overactive bladder)  Stable. Continue current management.      8. Pain in both knees, unspecified chronicity  Do PT. Referral reprinted.     9. Superficial peroneal nerve neuropathy, right  Likely causing the numb spot on her right shin.   Monitor.   No worrisome findings on exam.   If spreading or worsening she should rtc.         Return in about 4 months (around 5/31/2025) for supervisit.      Татьяна Horne MD

## 2025-02-14 DIAGNOSIS — E03.9 HYPOTHYROIDISM, UNSPECIFIED TYPE: ICD-10-CM

## 2025-02-17 ENCOUNTER — HOSPITAL ENCOUNTER (OUTPATIENT)
Dept: MAMMOGRAPHY | Age: 80
Discharge: HOME | End: 2025-02-17
Attending: INTERNAL MEDICINE
Payer: MEDICARE

## 2025-02-17 DIAGNOSIS — Z12.31 ENCOUNTER FOR SCREENING MAMMOGRAM FOR MALIGNANT NEOPLASM OF BREAST: ICD-10-CM

## 2025-02-17 PROCEDURE — 77067 SCR MAMMO BI INCL CAD: CPT | Performed by: INTERNAL MEDICINE

## 2025-02-17 PROCEDURE — 77063 BREAST TOMOSYNTHESIS BI: CPT | Performed by: INTERNAL MEDICINE

## 2025-02-17 RX ORDER — LEVOTHYROXINE SODIUM 75 UG/1
75 TABLET ORAL
Qty: 90 TABLET | Refills: 1 | Status: SHIPPED | OUTPATIENT
Start: 2025-02-17

## 2025-02-17 NOTE — TELEPHONE ENCOUNTER
Thyroid Medication Protocol [id number]/14/2025 10:12 AM   Protocol Details TSH in past 12 months    Last TSH value is normal    In person appointment or virtual visit in the past 12 mos or appointment in next 3 mos    Medication is active on med list   3. Hypothyroidism, unspecified type  Continue meds.   Future Appointments   Date Time Provider Department Center   6/2/2025  9:00 AM Татьяна Horne MD [facility] [place]

## 2025-03-11 ENCOUNTER — HOSPITAL ENCOUNTER (OUTPATIENT)
Dept: MAMMOGRAPHY | Facility: HOSPITAL | Age: 80
Discharge: HOME | End: 2025-03-11
Attending: INTERNAL MEDICINE
Payer: MEDICARE

## 2025-03-11 DIAGNOSIS — R92.2 INCONCLUSIVE MAMMOGRAM: ICD-10-CM

## 2025-03-11 PROCEDURE — 77061 BREAST TOMOSYNTHESIS UNI: CPT | Performed by: INTERNAL MEDICINE

## 2025-03-11 PROCEDURE — 77065 DX MAMMO INCL CAD UNI: CPT | Performed by: INTERNAL MEDICINE

## 2025-03-12 ENCOUNTER — OFFICE VISIT (OUTPATIENT)
Dept: ORTHOPEDICS CLINIC | Facility: CLINIC | Age: 80
End: 2025-03-12
Payer: MEDICARE

## 2025-03-12 VITALS — WEIGHT: 169 LBS | HEIGHT: 64.5 IN | BODY MASS INDEX: 28.5 KG/M2

## 2025-03-12 DIAGNOSIS — M25.462 EFFUSION OF LEFT KNEE: ICD-10-CM

## 2025-03-12 DIAGNOSIS — M17.0 PRIMARY OSTEOARTHRITIS OF BOTH KNEES: Primary | ICD-10-CM

## 2025-03-12 PROCEDURE — 20610 DRAIN/INJ JOINT/BURSA W/O US: CPT | Performed by: ORTHOPAEDIC SURGERY

## 2025-03-12 PROCEDURE — 99214 OFFICE O/P EST MOD 30 MIN: CPT | Performed by: ORTHOPAEDIC SURGERY

## 2025-03-12 RX ORDER — TRIAMCINOLONE ACETONIDE 40 MG/ML
40 INJECTION, SUSPENSION INTRA-ARTICULAR; INTRAMUSCULAR ONCE
Status: COMPLETED | OUTPATIENT
Start: 2025-03-12 | End: 2025-03-12

## 2025-03-12 RX ADMIN — TRIAMCINOLONE ACETONIDE 40 MG: 40 INJECTION, SUSPENSION INTRA-ARTICULAR; INTRAMUSCULAR at 14:59:00

## 2025-03-12 NOTE — PROGRESS NOTES
[facility] Note      Chief Complaint   Patient presents with    Knee Pain     BILATERAL KNEE  -Right knee is more painful       HPI: The patient is a 79 year old female returning for orthopaedic consultation with complaints of chronic bilateral knee pain.  She also has some persistent intermittent pain at the lateral right knee and posterior left knee with associated stiffness.  No new injury is reported, but she is experiencing intermittent click and crepitus, mild knee swelling and diminishing tolerance of prolonged standing and walking.  She uses ibuprofen  as needed.    Past Medical History:    Arthritis    Belching    Cancer (HCC)    Cataract    Constipation    Diarrhea, unspecified    Essential hypertension    Fallopian tube cancer, carcinoma (HCC)    Stage 1C papillary serous    Flatulence/gas pain/belching    Fracture of right foot    Frequent urination    Hearing loss    Herniated disc    Relieved with KENDALL x 1    High cholesterol    Hyperlipidemia    Hypothyroidism    Irregular bowel habits    L3-4 grade 1 unstable spondylolisthesis    Loss of appetite    Migraine    Nausea    Sleep disturbance    Uncomfortable fullness after meals    Vasovagal syncope    Vomiting    Wears glasses     Past Surgical History:   Procedure Laterality Date    Cataract  ,    Bilateral-then redo on L. Dr. Garcia    Chemotherapy  6 treatments    summer 2015 fallopian tube cancer    Colonoscopy  ?    Glaucoma surgery  ?    Hysterectomy  2015    stage 1c, fallopian tube cancer, Dr. Villa, robotic, [facility]      , ,     Oophorectomy      Other surgical history      bilateral cataract surgery    Other surgical history      arthritic cyst removed from finger    Other surgical history      fatty cyst removed from arm     Current Outpatient Medications   Medication Sig Dispense Refill    levothyroxine 75 MCG Oral Tab TAKE 1 TABLET(75 MCG) BY MOUTH BEFORE BREAKFAST 90 tablet 1     pantoprazole 20 MG Oral Tab EC TAKE 1 TABLET(20 MG) BY MOUTH EVERY MORNING BEFORE BREAKFAST 90 tablet 0    amLODIPine 5 MG Oral Tab TAKE 1 TABLET BY MOUTH IN THE MORNING AND AT NIGHT 180 tablet 0    losartan 50 MG Oral Tab TAKE 1 TABLET(50 MG) BY MOUTH DAILY 90 tablet 0    atorvastatin 80 MG Oral Tab TAKE 1 TABLET(80 MG) BY MOUTH EVERY NIGHT 90 tablet 0    diclofenac 1 % External Gel Apply 2 g topically 4 (four) times daily as needed. Generic ok 100 g 1    Cyanocobalamin (B-12 OR) Take by mouth daily.      azelastine 0.1 % Nasal Solution 2 sprays by Nasal route daily as needed.      acetaminophen 500 MG Oral Tab Take 1 tablet (500 mg total) by mouth every 6 (six) hours as needed for Pain. As needed      Cholecalciferol (VITAMIN D OR) Take 1,000 Units by mouth every morning.         Allergies[1]  Family History   Problem Relation Age of Onset    Other (Heart failure) Mother          at 76    Stroke Father          at 43 from cerebral hemorrhage    Hypertension Father         Due to kidney failure    Other (Other) Father         kidney disease    Heart Disorder Maternal Grandfather     Other (Diverticulitis) Other         , CHF, AF     Social History     Occupational History    Occupation: Homemaker   Tobacco Use    Smoking status: Never    Smokeless tobacco: Never    Tobacco comments:     No 2nd hand exposure   Vaping Use    Vaping status: Never Used   Substance and Sexual Activity    Alcohol use: Not Currently     Comment: 1 glass a wine nightly with dinner    Drug use: No    Sexual activity: Not on file        ROS:  Complete ROS reviewed by me and non-contributory to the chief complaint except as mentioned above.    Physical Exam:    Ht 5' 4.5\" (1.638 m)   Wt 169 lb (76.7 kg)   BMI 28.56 kg/m²   Constitutional: Well developed, well nourished 79 year old female  Psychological: NAD, alert and appropriate  Respiratory: Breathing comfortably on room air with RR of 10-14  Cardiac: Palpable distal pulses  with pink warm extremities distally  Right and left knees: Inspection reveals no significant discoloration nor deformity.  Palpation reveals trace effusion on the left with none on the right.  Range of motion is adequate with no significant flexion contractures and adequate flexion to at least 125 degrees bilaterally.  Medial joint line is tender on the left while lateral side is more tender on the right.  Collaterals are stable on varus valgus stress.  Lachman and posterior drawer are negative.  Popliteal spaces are nontender.  No significant distal edema is noted.  Neurovascular status is intact on sensory, motor and perfusion assessment distally.    Imaging:      Multiple views right and left knees personally viewed, independently interpreted and radiology report read.  The most severe joint space narrowing and arthritic changes seen to the lateral compartment of the right knee.  Moderate findings are seen at the medial compartment of the left knee.      Right knee:   Impression   CONCLUSION:    1. Tricompartmental osteoarthritis which is severe in the lateral compartment where there is significant joint space loss and mild genu valgus.  2. Ossified intra-articular bodies, similar in appearance to previous imaging.  3. Mild joint effusion.        LOCATION:  [facility]        Dictated by (CST): Savage Julio DO on 12/12/2024 at 2:14 PM      Finalized by (CST): Savage Julio DO on 12/12/2024 at 2:16 PM         Left knee:  Impression  CONCLUSION:    1. Tricompartmental osteoarthritis which is relatively mild in degree and most pronounced in the medial compartment.  2. Mild to moderate suprapatellar joint effusion.        LOCATION:  [facility]        Dictated by (CST): Savage Julio DO on 12/12/2024 at 2:16 PM      Finalized by (CST): Savage Julio DO on 12/12/2024 at 2:17 PM      Assessment/Diagnoses:    Diagnoses and all orders for this visit:    Primary osteoarthritis of both knees  -     DRAIN/INJECT LARGE JOINT/BURSA  -      triamcinolone acetonide (Kenalog-40) 40 MG/ML injection 40 mg  -     triamcinolone acetonide (Kenalog-40) 40 MG/ML injection 40 mg    Effusion of left knee  -     DRAIN/INJECT LARGE JOINT/BURSA  -     triamcinolone acetonide (Kenalog-40) 40 MG/ML injection 40 mg      Plan:  I reviewed imaging and exam findings with the patient.  The diagnosis is degenerative joint disease and mild effusion of the left knee most likely due to early degenerative joint disease at the medial compartment.  Ironically, the right knee shows greater arthritic change at the lateral compartment where she is only minimally symptomatic albeit chronically.  We discussed the etiology, natural history and treatment options in detail.  Treatments include activity modification, weight loss, anti-inflammatory use and possible injections.  In the long term, the patient's symptoms may progress and warrant consideration of total knee arthroplasty, but only if symptoms become severe.  For now, non-surgical treatment options are recommended and preferred.  We discussed the option for knee corticosteroid injection along with the potential risks, benefits and alternatives.  In light of manageable symptoms, the patient elects to proceed off for now.  Topical modalities including light compression, ice and perhaps Voltaren gel may provide low risk treatment options to supplement intermittent use of oral anti-inflammatories.  All questions were answered and the patient verbalized understanding and appreciation.    Left knee Aspiration Injection Procedure:  After discussing risk, benefits and alternatives to knee aspiration with injection including but not limited to needle infection, steroid flare or failed aspiration/improvement, the patient gave written and verbal consent to proceed.  Using meticulous sterile technique I injected 4 cc of 1% Xylocaine at the lateral patellofemoral joint of the left knee for anesthesia.  After repeat sterile prep, I used  an 18-gauge needle to aspirate 20 cc of clear straw-colored fluid.  This was followed by injection of 40 mg of Kenalog mixed with 4 cc of 1% Xylocaine through the same needle to minimal resistance.  Band-Aid was applied followed by application of a compressive 6 inch Ace wrap.  Instructions were given to contact us if the patient has any adverse reactions.  Patient tolerated the aspiration injection well with no evidence of complications.    Right knee Cortisone Injection Procedure:  After discussing risks, benefits and alternatives to cortisone injection including but not limited to needle infection, steroid flare or failed improvement, the patient gave written consent to proceed.  Using meticulous sterile technique, we injected 40 mg of Kenalog mixed with 4 cc of 1% Xylocaine at the lateral patellofemoral joint of the right knee to minimal resistance.  The patient tolerated this well and a Band-Aid was applied.  Instructions were given to contact us if the patient experiences any adverse effects.      Maddi Stafford MD, [facility]  Orthopaedic Surgery   Sports Medicine/Knee and Shoulder  [facility]  t: [phone]  f: [phone]           This document was partially prepared using Dragon Medical voice recognition software.  Although every attempt is made to correct errors during dictation, discrepancies may still exist.               [1]   Allergies  Allergen Reactions    Crabs (Crustaceans)      vomitting and diarrhea    Myrbetriq [Mirabegron] OTHER (SEE COMMENTS)     Leg swelling    Shrimp DIARRHEA

## 2025-04-03 ENCOUNTER — OFFICE VISIT (OUTPATIENT)
Dept: INTERNAL MEDICINE CLINIC | Facility: CLINIC | Age: 80
End: 2025-04-03
Payer: MEDICARE

## 2025-04-03 VITALS
RESPIRATION RATE: 16 BRPM | OXYGEN SATURATION: 97 % | BODY MASS INDEX: 28 KG/M2 | WEIGHT: 166 LBS | SYSTOLIC BLOOD PRESSURE: 118 MMHG | TEMPERATURE: 97 F | HEART RATE: 74 BPM | DIASTOLIC BLOOD PRESSURE: 78 MMHG | HEIGHT: 64.5 IN

## 2025-04-03 DIAGNOSIS — R50.9 FEVER AND CHILLS: ICD-10-CM

## 2025-04-03 DIAGNOSIS — B34.9 VIRAL SYNDROME: Primary | ICD-10-CM

## 2025-04-03 LAB
BILIRUB UR QL STRIP.AUTO: NEGATIVE
CLARITY UR REFRACT.AUTO: CLEAR
COVID19 BINAX NOW ANTIGEN: NOT DETECTED
GLUCOSE UR STRIP.AUTO-MCNC: NORMAL MG/DL
KETONES UR STRIP.AUTO-MCNC: NEGATIVE MG/DL
LEUKOCYTE ESTERASE UR QL STRIP.AUTO: NEGATIVE
NITRITE UR QL STRIP.AUTO: NEGATIVE
OPERATOR ID: NORMAL
PH UR STRIP.AUTO: 6.5 [PH] (ref 5–8)
POCT LOT NUMBER: NORMAL
PROT UR STRIP.AUTO-MCNC: NEGATIVE MG/DL
SP GR UR STRIP.AUTO: 1.01 (ref 1–1.03)
UROBILINOGEN UR STRIP.AUTO-MCNC: NORMAL MG/DL

## 2025-04-03 PROCEDURE — 87086 URINE CULTURE/COLONY COUNT: CPT | Performed by: NURSE PRACTITIONER

## 2025-04-03 PROCEDURE — 99213 OFFICE O/P EST LOW 20 MIN: CPT | Performed by: NURSE PRACTITIONER

## 2025-04-03 PROCEDURE — 81001 URINALYSIS AUTO W/SCOPE: CPT | Performed by: NURSE PRACTITIONER

## 2025-04-03 NOTE — PATIENT INSTRUCTIONS
Keep well hydrated     You can use robitussin DM or mucinex DM as directed on the bottle for cough and chest congestion.     Okay to keep nyquil at night     Use cepacol for sore throat and dry cough as needed.      Use tylenol as needed for pain or fever    Do deep breathing exercises     Watch for secondary ear, sinus infection or pneumonia symptoms. Let the office know if any occur and your symptoms are not emergent    Go to the ER for any emergent symptoms. If you cannot get there safely call 911.       Follow up as needed or when routine care is due  Viral Upper Respiratory Illness (Adult)    You have a viral upper respiratory illness (URI), which is another term for the common cold. This illness is contagious during the first few days. It is spread through the air by coughing and sneezing. It may also be spread by direct contact (touching the sick person and then touching your own eyes, nose, or mouth). Frequent handwashing will decrease risk of spread. Most viral illnesses go away within 7 to 10 days with rest and simple home remedies. Sometimes the illness may last for several weeks. Antibiotics will not kill a virus, and they are generally not prescribed for this condition.  Home care  If symptoms are severe, rest at home for the first 2 to 3 days. When you resume activity, don't let yourself get too tired.  Don't smoke. If you need help stopping, talk with your healthcare provider.  Avoid being exposed to cigarette smoke (yours or others’).  You may use acetaminophen or ibuprofen to control pain and fever, unless another medicine was prescribed. If you have chronic liver or kidney disease, have ever had a stomach ulcer or gastrointestinal bleeding, or are taking blood-thinning medicines, talk with your healthcare provider before using these medicines. Aspirin should never be given to anyone under 18 years of age who is ill with a viral infection or fever. It may cause severe liver or brain damage.  Your  appetite may be poor, so a light diet is fine. Stay well hydrated by drinking 6 to 8 glasses of fluids per day (water, soft drinks, juices, tea, or soup). Extra fluids will help loosen secretions in the nose and lungs.  Over-the-counter cold medicines will not shorten the length of time you’re sick, but they may be helpful for the following symptoms: cough, sore throat, and nasal and sinus congestion. If you take prescription medicines, ask your healthcare provider or pharmacist which over-the-counter medicines are safe to use. (Note: Don't use decongestants if you have high blood pressure.)  Follow-up care  Follow up with your healthcare provider, or as advised.  When to seek medical advice  Call your healthcare provider right away if any of these occur:  Cough with lots of colored sputum (mucus)  Severe headache; face, neck, or ear pain  Difficulty swallowing due to throat pain  Fever of 100.4°F (38°C) or higher, or as directed by your healthcare provider  Call 911  Call 911 if any of these occur:  Chest pain, shortness of breath, wheezing, or difficulty breathing  Coughing up blood  Very severe pain with swallowing, especially if it goes along with a muffled voice   Date Last Reviewed: 6/1/2018  © 2464-7156 The "[employer]. [address] [place] [address], [place] [address]. All rights reserved. This information is not intended as a substitute for professional medical care. Always follow your healthcare professional's instructions.

## 2025-04-03 NOTE — PROGRESS NOTES
Yi FELICIANO Malec is a 80 year old female.  CHIEF COMPLAINT   Multiple symptoms    HPI:   Symptoms started 7 days ago with sore throat, some congestion, cough that is mostly dry, poor appetite, change of taste, nausea this am, fatigue, achy.     No diarrhea, vomiting, sob.     Did not yet do a COVID test.    Current Outpatient Medications   Medication Sig Dispense Refill    levothyroxine 75 MCG Oral Tab TAKE 1 TABLET(75 MCG) BY MOUTH BEFORE BREAKFAST 90 tablet 1    pantoprazole 20 MG Oral Tab EC TAKE 1 TABLET(20 MG) BY MOUTH EVERY MORNING BEFORE BREAKFAST 90 tablet 0    amLODIPine 5 MG Oral Tab TAKE 1 TABLET BY MOUTH IN THE MORNING AND AT NIGHT 180 tablet 0    losartan 50 MG Oral Tab TAKE 1 TABLET(50 MG) BY MOUTH DAILY 90 tablet 0    atorvastatin 80 MG Oral Tab TAKE 1 TABLET(80 MG) BY MOUTH EVERY NIGHT 90 tablet 0    diclofenac 1 % External Gel Apply 2 g topically 4 (four) times daily as needed. Generic ok 100 g 1    Cyanocobalamin (B-12 OR) Take by mouth daily.      azelastine 0.1 % Nasal Solution 2 sprays by Nasal route daily as needed.      acetaminophen 500 MG Oral Tab Take 1 tablet (500 mg total) by mouth every 6 (six) hours as needed for Pain. As needed      Cholecalciferol (VITAMIN D OR) Take 1,000 Units by mouth every morning.          Past Medical History:    Arthritis    Belching    Cancer (HCC)    Cataract    Constipation    Diarrhea, unspecified    Essential hypertension    Fallopian tube cancer, carcinoma (HCC)    Stage 1C papillary serous    Flatulence/gas pain/belching    Fracture of right foot    Frequent urination    Hearing loss    Herniated disc    Relieved with KENDALL x 1    High cholesterol    Hyperlipidemia    Hypothyroidism    Irregular bowel habits    L3-4 grade 1 unstable spondylolisthesis    Loss of appetite    Migraine    Nausea    Sleep disturbance    Uncomfortable fullness after meals    Vasovagal syncope    Vomiting    Wears glasses      Social History:  Social History     Socioeconomic  History    Marital status:      Spouse name: Conner Roberts)   Occupational History    Occupation: Homemaker   Tobacco Use    Smoking status: Never    Smokeless tobacco: Never    Tobacco comments:     No 2nd hand exposure   Vaping Use    Vaping status: Never Used   Substance and Sexual Activity    Alcohol use: Not Currently     Comment: 1 glass a wine nightly with dinner    Drug use: No   Other Topics Concern    Caffeine Concern Yes     Comment: coffee    Stress Concern No    Weight Concern No    Special Diet No    Exercise No    Seat Belt Yes   Social History Narrative    The patient does not use an assistive device..      The patient does live in a home with stairs.     Social Drivers of Health     Food Insecurity: No Food Insecurity (1/31/2025)    NCSS - Food Insecurity     Worried About Running Out of Food in the Last Year: No     Ran Out of Food in the Last Year: No   Transportation Needs: No Transportation Needs (1/31/2025)    NCSS - Transportation     Lack of Transportation: No   Housing Stability: Not At Risk (1/31/2025)    NCSS - Housing/Utilities     Has Housing: Yes     Worried About Losing Housing: No     Unable to Get Utilities: No        REVIEW OF SYSTEMS:   See HPI     EXAM:     /78 (BP Location: Left arm, Patient Position: Sitting, Cuff Size: adult)   Pulse 74   Temp 97.1 °F (36.2 °C) (Temporal)   Resp 16   Ht 5' 4.5\" (1.638 m)   Wt 166 lb (75.3 kg)   SpO2 97%   BMI 28.05 kg/m²   Body mass index is 28.05 kg/m².   GENERAL: well developed, well nourished,in no apparent distress  SKIN: no rashes, no suspicious lesions  HEENT: atraumatic, normocephalic, ears and throat are clear, no sinus pain  EYES:  conjunctiva are clear  NECK: supple,no adenopathy    LUNGS: clear to auscultation; no rhonchi, rales, or wheezing  CARDIO: RRR without murmur  GI:  no masses, organomegaly or tenderness  MUSCULOSKELETAL: No obvious joint deformity or swelling.  Normal gait.  EXTREMITIES: no edema  NEURO:  Oriented times three       LABS:      Lab Results   Component Value Date    WBC 8.8 06/10/2024    RBC 4.18 06/10/2024    HGB 13.0 06/10/2024    HCT 38.1 06/10/2024    MCV 91.1 06/10/2024    MCH 31.1 06/10/2024    MCHC 34.1 06/10/2024    RDW 12.8 06/10/2024    .0 06/10/2024      Lab Results   Component Value Date    GLU 97 01/31/2025    BUN 15 01/31/2025    BUNCREA 14.9 04/19/2021    CREATSERUM 0.95 01/31/2025    ANIONGAP 9 01/31/2025    GFR 79 09/19/2017    GFRNAA 74 05/14/2022    GFRAA 85 05/14/2022    CA 9.9 01/31/2025    OSMOCALC 289 01/31/2025    ALKPHO 77 01/31/2025    AST 23 01/31/2025    ALT 16 01/31/2025    BILT 0.6 01/31/2025    TP 7.9 01/31/2025    ALB 4.3 01/31/2025    GLOBULIN 3.6 (H) 01/31/2025     01/31/2025    K 3.8 01/31/2025     01/31/2025    CO2 28.0 01/31/2025      Lab Results   Component Value Date    CHOLEST 171 05/30/2024    TRIG 51 05/30/2024    HDL 86 (H) 05/30/2024    LDL 75 05/30/2024    VLDL 8 05/30/2024    TCHDLRATIO 1.82 03/02/2018    NONHDLC 85 05/30/2024      Lab Results   Component Value Date    TSH 2.610 05/30/2024      Lab Results   Component Value Date     05/30/2024    A1C 6.0 (H) 05/30/2024        IMAGING:     [facility] SHIN 2D+3D DIAGNOSTIC ADDL VWS RIGHT (CPT=77065/65669)    Result Date: 3/11/2025  PROCEDURE:  [facility] SHIN 2D+3D DIAGNOSTIC ADDL VWS RIGHT (CPT=77065/76872)  COMPARISON:  [facility] MURIEL, [facility] SHIN 2D+3D SCREENING BILAT (CPT=77067/20623), 2/15/2024, 2:38 PM.  [facility], [facility] SHIN 2D+3D SCREENING BILAT (CPT=77067/35595), 2/17/2025, 9:47 AM.  INDICATIONS:  R92.2 Inconclusive mammogram  VIEWS OBTAINED:  Diagnostic views of the right breast were obtained.  Standard 2D and additional multiplane thin sections of the breast were obtained for the purpose of Tomosynthesis evaluation.  The images were reconstructed and reviewed on the dedicated Tomosynthesis workstation.  BREAST COMPOSITION:  Almost entirely fatty.  FINDINGS:  The focal asymmetry questioned in the  screening mammogram in the upper outer right breast dissipates on spot compression imaging, consistent with superimposition of normal breast tissue.            CONCLUSION:  No evidence of malignancy in the right breast.  If there is no change in the clinical breast exam, recommend annual screening mammography.  BI-RADS CATEGORY:  DIAGNOSTIC CATEGORY 1 - NEGATIVE ASSESSMENT.   RECOMMENDATIONS:  ROUTINE MAMMOGRAM AND CLINICAL EVALUATION IN 12 MONTHS.      A letter explaining the results in lay terms has been sent to the patient.  This exam was evaluated with a computer-aided device.  This patient's information has been entered into a reminder system with a target due date for the next mammogram.   LOCATION:  [place]       Dictated by (CST): Federica Chavez MD on 3/11/2025 at 11:15 AM     Finalized by (CST): Federica Chavez MD on 3/11/2025 at 11:16 AM   [address] [address] [phone]       ASSESSMENT AND PLAN:   1. Viral syndrome  -Likely viral syndrome without shortness of breath or emergent symptoms.  -COVID-negative  -No signs of ear, sinus infection or pneumonia.  Lungs are clear.  -Supportive care discussed in detail as well as symptoms to monitor for  -Patient will call us back if she happens to get ear or sinus infection and needs an antibiotic  -To ER as needed for emergent symptoms  - COVID19 BinaxNOW Antigen    2. Fever and chills  -UA and culture checked due to the fever and chills as a precaution  - Urinalysis, Routine; Future  - Urine Culture, Routine; Future  - Urinalysis, Routine  - Urine Culture, Routine      The patient indicates understanding of these issues and agrees to the plan.  The patient is asked to return as needed or when routine care is due.   Gen: NAD, AOx3, able to make needs known, non-toxic  Head: NCAT  HEENT: EOMI, oral mucosa moist, normal conjunctiva  Lung: CTAB, no respiratory distress, no wheezes/rhonchi/rales B/L, speaking in full sentences  CV: tachycardic, no murmurs  Abd: non distended, soft, nontender, no guarding, no CVA tenderness  MSK: no visible deformities. no LE edema b/l  Neuro: Appears non focal  Skin: Warm, well perfused  Psych: normal affect

## 2025-04-13 DIAGNOSIS — R09.89 CHRONIC THROAT CLEARING: ICD-10-CM

## 2025-04-13 DIAGNOSIS — E78.00 HYPERCHOLESTEROLEMIA: ICD-10-CM

## 2025-04-13 DIAGNOSIS — R09.89 LABILE HYPERTENSION: ICD-10-CM

## 2025-04-14 RX ORDER — ATORVASTATIN CALCIUM 80 MG/1
80 TABLET, FILM COATED ORAL NIGHTLY
Qty: 90 TABLET | Refills: 0 | Status: SHIPPED | OUTPATIENT
Start: 2025-04-14

## 2025-04-14 RX ORDER — LOSARTAN POTASSIUM 50 MG/1
50 TABLET ORAL DAILY
Qty: 90 TABLET | Refills: 0 | Status: SHIPPED | OUTPATIENT
Start: 2025-04-14

## 2025-04-14 NOTE — TELEPHONE ENCOUNTER
Hypertension Medications Protocol Cqyahs9504/13/2025 06:22 PM   Protocol Details CMP or BMP in past 12 months    Last BP reading less than 140/90    In person appointment or virtual visit in the past 12 mos or appointment in next 3 mos    EGFRCR or GFRNAA > 50    Medication is active on med list      1. Labile hypertension  Continue meds.     Cholesterol Medication Protocol Isjokb2704/13/2025 06:22 PM   Protocol Details ALT < 80    ALT resulted within past year    Lipid panel within past 12 months    In person appointment or virtual visit in the past 12 mos or appointment in next 3 mos    Medication is active on med list      2. Hypercholesterolemia  Continue statin.   Future Appointments   Date Time Provider Department Center   6/2/2025  9:00 AM Татьяна Horne MD [facility] [place]

## 2025-04-19 DIAGNOSIS — K21.9 GASTROESOPHAGEAL REFLUX DISEASE, UNSPECIFIED WHETHER ESOPHAGITIS PRESENT: ICD-10-CM

## 2025-04-19 DIAGNOSIS — R09.89 LABILE HYPERTENSION: ICD-10-CM

## 2025-04-22 RX ORDER — AMLODIPINE BESYLATE 5 MG/1
5 TABLET ORAL 2 TIMES DAILY
Qty: 180 TABLET | Refills: 0 | Status: SHIPPED | OUTPATIENT
Start: 2025-04-22

## 2025-04-22 RX ORDER — PANTOPRAZOLE SODIUM 20 MG/1
20 TABLET, DELAYED RELEASE ORAL
Qty: 90 TABLET | Refills: 0 | Status: SHIPPED | OUTPATIENT
Start: 2025-04-22

## 2025-04-22 NOTE — TELEPHONE ENCOUNTER
Gastrointestional Medication Protocol Bzbfux1104/19/2025 02:44 PM   Protocol Details In person appointment or virtual visit in the past 12 mos or appointment in next 3 mos    Medication is active on med list       Hypertension Medications Protocol Eiqyhc8004/19/2025 02:44 PM   Protocol Details CMP or BMP in past 12 months    Last BP reading less than 140/90    In person appointment or virtual visit in the past 12 mos or appointment in next 3 mos    EGFRCR or GFRNAA > 50    Medication is active on med list      1. Labile hypertension  Continue meds.   She gets lightheaded when she stands. Check bp at home and send me readings. If low will cut down on bp meds.   Future Appointments   Date Time Provider Department Center   6/2/2025  9:00 AM Татьяна Horne MD [facility] [address] [place]     Message sent to check on BP/symptoms.

## 2025-04-22 NOTE — TELEPHONE ENCOUNTER
Please see pt bps/update- labile still and symptoms are about the same.    Future Appointments   Date Time Provider Department Center   6/2/2025  9:00 AM Татьяна Horne MD [facility] [place]

## 2025-05-31 ENCOUNTER — LAB ENCOUNTER (OUTPATIENT)
Dept: LAB | Facility: HOSPITAL | Age: 80
End: 2025-05-31
Attending: INTERNAL MEDICINE
Payer: MEDICARE

## 2025-05-31 DIAGNOSIS — Z00.00 ENCOUNTER FOR ANNUAL WELLNESS VISIT: ICD-10-CM

## 2025-05-31 DIAGNOSIS — R73.03 PRE-DIABETES: ICD-10-CM

## 2025-05-31 DIAGNOSIS — E78.00 HYPERCHOLESTEROLEMIA: ICD-10-CM

## 2025-05-31 DIAGNOSIS — E03.9 HYPOTHYROIDISM, UNSPECIFIED TYPE: ICD-10-CM

## 2025-05-31 LAB
ALBUMIN SERPL-MCNC: 4.3 G/DL (ref 3.2–4.8)
ALBUMIN/GLOB SERPL: 1.2 {RATIO} (ref 1–2)
ALP LIVER SERPL-CCNC: 70 U/L (ref 55–142)
ALT SERPL-CCNC: 14 U/L (ref 10–49)
ANION GAP SERPL CALC-SCNC: 6 MMOL/L (ref 0–18)
AST SERPL-CCNC: 21 U/L (ref ?–34)
BASOPHILS # BLD AUTO: 0.03 X10(3) UL (ref 0–0.2)
BASOPHILS NFR BLD AUTO: 0.4 %
BILIRUB SERPL-MCNC: 0.6 MG/DL (ref 0.2–1.1)
BUN BLD-MCNC: 13 MG/DL (ref 9–23)
CALCIUM BLD-MCNC: 10 MG/DL (ref 8.7–10.6)
CHLORIDE SERPL-SCNC: 102 MMOL/L (ref 98–112)
CHOLEST SERPL-MCNC: 169 MG/DL (ref ?–200)
CO2 SERPL-SCNC: 31 MMOL/L (ref 21–32)
CREAT BLD-MCNC: 0.9 MG/DL (ref 0.55–1.02)
EGFRCR SERPLBLD CKD-EPI 2021: 65 ML/MIN/1.73M2 (ref 60–?)
EOSINOPHIL # BLD AUTO: 0.2 X10(3) UL (ref 0–0.7)
EOSINOPHIL NFR BLD AUTO: 2.5 %
ERYTHROCYTE [DISTWIDTH] IN BLOOD BY AUTOMATED COUNT: 13.2 %
EST. AVERAGE GLUCOSE BLD GHB EST-MCNC: 126 MG/DL (ref 68–126)
FASTING PATIENT LIPID ANSWER: YES
FASTING STATUS PATIENT QL REPORTED: YES
GLOBULIN PLAS-MCNC: 3.6 G/DL (ref 2–3.5)
GLUCOSE BLD-MCNC: 96 MG/DL (ref 70–99)
HBA1C MFR BLD: 6 % (ref ?–5.7)
HCT VFR BLD AUTO: 37.6 % (ref 35–48)
HDLC SERPL-MCNC: 77 MG/DL (ref 40–59)
HGB BLD-MCNC: 12.1 G/DL (ref 12–16)
IMM GRANULOCYTES # BLD AUTO: 0.02 X10(3) UL (ref 0–1)
IMM GRANULOCYTES NFR BLD: 0.2 %
LDLC SERPL CALC-MCNC: 81 MG/DL (ref ?–100)
LYMPHOCYTES # BLD AUTO: 3.61 X10(3) UL (ref 1–4)
LYMPHOCYTES NFR BLD AUTO: 44.7 %
MCH RBC QN AUTO: 30.4 PG (ref 26–34)
MCHC RBC AUTO-ENTMCNC: 32.2 G/DL (ref 31–37)
MCV RBC AUTO: 94.5 FL (ref 80–100)
MONOCYTES # BLD AUTO: 0.71 X10(3) UL (ref 0.1–1)
MONOCYTES NFR BLD AUTO: 8.8 %
NEUTROPHILS # BLD AUTO: 3.51 X10 (3) UL (ref 1.5–7.7)
NEUTROPHILS # BLD AUTO: 3.51 X10(3) UL (ref 1.5–7.7)
NEUTROPHILS NFR BLD AUTO: 43.4 %
NONHDLC SERPL-MCNC: 92 MG/DL (ref ?–130)
OSMOLALITY SERPL CALC.SUM OF ELEC: 288 MOSM/KG (ref 275–295)
PLATELET # BLD AUTO: 282 10(3)UL (ref 150–450)
POTASSIUM SERPL-SCNC: 4.2 MMOL/L (ref 3.5–5.1)
PROT SERPL-MCNC: 7.9 G/DL (ref 5.7–8.2)
RBC # BLD AUTO: 3.98 X10(6)UL (ref 3.8–5.3)
SODIUM SERPL-SCNC: 139 MMOL/L (ref 136–145)
TRIGL SERPL-MCNC: 54 MG/DL (ref 30–149)
TSI SER-ACNC: 2.78 UIU/ML (ref 0.55–4.78)
VLDLC SERPL CALC-MCNC: 8 MG/DL (ref 0–30)
WBC # BLD AUTO: 8.1 X10(3) UL (ref 4–11)

## 2025-05-31 PROCEDURE — 80061 LIPID PANEL: CPT

## 2025-05-31 PROCEDURE — 36415 COLL VENOUS BLD VENIPUNCTURE: CPT

## 2025-05-31 PROCEDURE — 83036 HEMOGLOBIN GLYCOSYLATED A1C: CPT

## 2025-05-31 PROCEDURE — 85025 COMPLETE CBC W/AUTO DIFF WBC: CPT

## 2025-05-31 PROCEDURE — 80053 COMPREHEN METABOLIC PANEL: CPT

## 2025-05-31 PROCEDURE — 84443 ASSAY THYROID STIM HORMONE: CPT

## 2025-06-01 NOTE — PROGRESS NOTES
Subjective:   Yi Diaz is a 80 year old female who presents for a MA AHA (Medicare Advantage Annual Health Assessment) and Subsequent Annual Wellness visit (Pt already had Initial Annual Wellness) and scheduled follow up of multiple significant but stable problems.          The following individual(s) verbally consented to be recorded using ambient AI listening technology and understand that they can each withdraw their consent to this listening technology at any point by asking the clinician to turn off or pause the recording:    Patient name: Yi Diaz  Additional names:  none     mammo done 2/2025, then had additional views of right 3/2025. negative. Ordered for next year.   Colonoscopy done at age 71. She does not want any more. No polyps.   dexa done 4/2022. Normal. Will check next year.   She gets pelvic exams with dr. Villa. Sees him due to prior history of ovarian cancer.      Up to date prevnar 20, shingrix, tdap, rsv.   Due covid booster. Get at her local pharmacy.       AHA flowsheet reviewed.   No falls.   Memory testing normal.   Mood has been stable. No depression.   Seeing eye doctor yearly.   History of Present Illness  She has hypertension and her blood pressure was 144/80 today. She is due to take her medication upon returning home and has not brought any home blood pressure readings but plans to send them after taking her medication. She is on medications for high blood pressure, high cholesterol, thyroid issues, and acid reflux.    She has a history of ovarian cancer and has had a complete hysterectomy. She questions the necessity of pelvic exams given her surgical history. She has been cancer-free for ten years following the surgery. She sees dr. Villa and will ask him about need for continued pelvic exams.     She manages overactive bladder conservatively but is experiencing urinary frequency, getting up every two hours at night. She wants to see urogynecology.     She has knee  pain for which she received an injection, resulting in significant improvement. She did not pursue physical therapy as she did not need it.     She experiences occasional swelling in her feet at the end of the day, which resolves overnight. She inquires about the use of diuretics, but current management is conservative.    She has prediabetes with an A1c of 6.0, which has been stable since 2023.     Moderate physical activity is maintained with no difficulty in daily activities, no falls, and no vision problems. Good mental health is reported with no depression, good memory, and excellent hearing with aids.    She recently had a mammogram with additional views of the right breast, which were negative. Her last bone density test was in April 2022, which was normal, and she plans to recheck it next year.    She has noticed a new spot on her leg that has not resolved. She has previously seen a dermatologist for concerns. Would like to see them again.     History/Other:   Fall Risk Assessment:   She has been screened for Falls and is High Risk. Fall Prevention information provided to patient in After Visit Summary.    Do you feel unsteady when standing or walking?: (Patient-Rptd) No  Do you worry about falling?: (Patient-Rptd) Yes  Have you fallen in the past year?: (Patient-Rptd) No     Cognitive Assessment:   She had a completely normal cognitive assessment - see flowsheet entries       Functional Ability/Status:   Yi Diaz has some abnormal functions as listed below:  She has Hearing problems based on screening of functional status.She has Vision problems based on screening of functional status.       Depression Screening (PHQ):  PHQ-2 SCORE: 0  , done 5/30/2025   Last [place] Suicide Screening on 6/2/2025 was No Risk.       Advanced Directives:   She does have a Living Will but we do NOT have it on file in Epic.    She has a Power of  for Health Care on file in [employer]  Discussed with patient and  provided information.        Problem List[1]  Allergies:  She is allergic to crabs (crustaceans) and myrbetriq [mirabegron].    Current Medications:  Active Meds, Sig Only[2]    Medical History:  She  has a past medical history of Arthritis (), Belching, Cancer (HCC) (), Cataract, Constipation, Diarrhea, unspecified, Essential hypertension (), Fallopian tube cancer, carcinoma (HCC) (2015), Flatulence/gas pain/belching, Fracture of right foot, Frequent urination, Hearing loss, Herniated disc (), High cholesterol, Hyperlipidemia (?), Hypothyroidism (?), Irregular bowel habits, L3-4 grade 1 unstable spondylolisthesis (2019), Loss of appetite, Migraine, Nausea, Sleep disturbance, Uncomfortable fullness after meals, Vasovagal syncope (06/10/2016), Vomiting, and Wears glasses.  Surgical History:  She  has a past surgical history that includes cataract (,); oophorectomy (); chemotherapy (6 treatments); colonoscopy (?); glaucoma surgery (?);  (, , ); hysterectomy (2015); other surgical history; other surgical history; and other surgical history.   Family History:  Her family history includes Diverticulitis in an other family member; Heart Disorder in her maternal grandfather; Heart failure in her mother; Hypertension in her father; Other in her father; Stroke in her father.  Social History:  She  reports that she has never smoked. She has never used smokeless tobacco. She reports current alcohol use. She reports that she does not use drugs.    Tobacco:  She has never smoked tobacco.    CAGE Alcohol Screen:   CAGE screening score of 0 on 2025, showing low risk of alcohol abuse.      Patient Care Team:  Татьяна Horne MD as PCP - General (Internal Medicine)  Fercho Villa MD (OBSTETRICS & GYNECOLOGY)  Rafa Epps (Internal Medicine)  Trevon Gonzalez MD (Physical Medicine)  Melly Elizabeth APRN (Nurse Practitioner)    Review of Systems  GENERAL: feels well  otherwise  SKIN: denies any unusual skin lesions  EYES:denies blurred vision or double vision  HEENT: denies nasal congestion, sinus pain or ST  LUNGS: denies shortness of breath with exertion  CARDIOVASCULAR: denies chest pain on exertion  GI: denies abdominal pain,denies heartburn  : denies dysuria, vaginal discharge or itching  MUSCULOSKELETAL: denies back pain  NEURO: denies headaches  PSYCHE: denies depression or anxiety  HEMATOLOGIC: denies hx of anemia  ENDOCRINE: hypothyroid  ALL/ASTHMA: denies hx of allergy or asthma     Objective:   Physical Exam  General Appearance:  Alert, cooperative, no distress, appears stated age   Head:  Normocephalic, without obvious abnormality, atraumatic   Eyes:  PERRL, conjunctiva/corneas clear, EOM's intact both eyes   Ears:  Normal TM's and external ear canals, both ears   Nose: Nares normal, septum midline,mucosa normal, no drainage or sinus tenderness   Throat: Lips, mucosa, and tongue normal; teeth and gums normal   Neck: Supple, symmetrical, trachea midline, no adenopathy;  thyroid: not enlarged, no carotid bruit or JVD   Back:   Symmetric, no curvature, ROM normal, no CVA tenderness   Lungs:   Clear to auscultation bilaterally, respirations unlabored   Heart:  Regular rate and rhythm, S1 and S2 normal, no murmur, rub, or gallop   Abdomen:   Soft, non-tender, bowel sounds active all four quadrants,  no masses, no organomegaly   Pelvic: Deferred   Extremities: Extremities normal, atraumatic, no cyanosis or edema   Pulses: 2+ and symmetric   Skin: Skin color, texture, turgor normal, no rashes or lesions   Lymph nodes: Cervical, supraclavicular, and axillary nodes normal   Neurologic: Normal       /80   Pulse 76   Temp 97.6 °F (36.4 °C) (Temporal)   Resp 16   Ht 5' 4.5\" (1.638 m)   Wt 168 lb 12.8 oz (76.6 kg)   Breastfeeding No   BMI 28.53 kg/m²  Estimated body mass index is 28.53 kg/m² as calculated from the following:    Height as of this encounter: 5' 4.5\"  (1.638 m).    Weight as of this encounter: 168 lb 12.8 oz (76.6 kg).    Medicare Hearing Assessment:   Hearing Screening    Screening Method: Finger Rub  Finger Rub Result: Pass (Comment: has hearing aids)               Assessment & Plan:   Yi Diaz is a 80 year old female who presents for a Medicare Assessment.     1. Encounter for annual health examination  mammo done 2/2025, then had additional views of right 3/2025. negative. Ordered for next year.   Colonoscopy done at age 71. She does not want any more. No polyps.   dexa done 4/2022. Normal. Will check next year.   She gets pelvic exams with dr. Villa. Sees him due to prior history of ovarian cancer.      Up to date prevnar 20, shingrix, tdap, rsv.   Due covid booster. Get at her local pharmacy.       AHA flowsheet reviewed.   No falls.   Memory testing normal.   Mood has been stable. No depression.   Seeing eye doctor yearly.     2. Labile hypertension  Continue meds.     3. Hypercholesterolemia  Continue statin.   - CMP in 6 months; Future  - Lipid in 6 months; Future    4. Pre-diabetes  Continue low carb diet.   - Hemoglobin A1C in 6 months; Future    5. Hypothyroidism, unspecified type  Continue levothyroxine.   Labs reviewed.     6. Non-occlusive coronary artery disease: 30% mid LAD 2015  Continue statin.     7. Skin lesion  follow up with derm.     8. Encounter for screening mammogram for malignant neoplasm of breast  - [facility] SHIN 2D+3D SCREENING BILAT (CPT=77067/95303); Future    9. Chronic pain of left knee  follow up with dr. Stafford for injection again if needed.     10. OAB (overactive bladder)  Bothering her. See urogyne.   - Urogynecology Referral - In Network    11. Mixed stress and urge urinary incontinence  See urogyne.     12. 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 History of ovarian cancer  follow up with dr. Villa.     14. Non-seasonal allergic rhinitis, unspecified trigger  Stable. Continue current management.        The  patient indicates understanding of these issues and agrees to the plan.  Reinforced healthy diet, lifestyle, and exercise.      Return in about 6 months (around 12/2/2025) for med check.     Татьяна Horne MD, 6/1/2025     Supplementary Documentation:   General Health:  In the past six months, have you lost more than 10 pounds without trying?: (Patient-Rptd) 2 - No  Has your appetite been poor?: (Patient-Rptd) No  Type of Diet: (Patient-Rptd) Balanced  How does the patient maintain a good energy level?: (Patient-Rptd) Stretching  How would you describe your daily physical activity?: (Patient-Rptd) Moderate  How would you describe your current health state?: (Patient-Rptd) Good  How do you maintain positive mental well-being?: (Patient-Rptd) Social Interaction, Puzzles, Games, Visiting Friends, Visiting Family  On a scale of 0 to 10, with 0 being no pain and 10 being severe pain, what is your pain level?: 0 - (None)  In the past six months, have you experienced urine leakage?: (Patient-Rptd) 1-Yes  At any time do you feel concerned for the safety/well-being of yourself and/or your children, in your home or elsewhere?: (Patient-Rptd) No  Have you had any immunizations at another office such as Influenza, Hepatitis B, Tetanus, or Pneumococcal?: Yes    Health Maintenance   Topic Date Due    Annual Well Visit  01/01/2025    COVID-19 Vaccine (8 - 2024-25 season) 03/27/2025    Mammogram  03/11/2026    Influenza Vaccine  Completed    DEXA Scan  Completed    Annual Depression Screening  Completed    Fall Risk Screening (Annual)  Completed    Pneumococcal Vaccine: 50+ Years  Completed    Zoster Vaccines  Completed    Meningococcal B Vaccine  Aged Out    Colorectal Cancer Screening  Discontinued            [1]   Patient Active Problem List  Diagnosis    Hypercholesterolemia    Hypothyroidism    Non-occlusive coronary artery disease: 30% mid LAD 2015    Labile hypertension    History of ovarian cancer    Incontinence    Chronic  pain of left knee    Pre-diabetes    Non-seasonal allergic rhinitis    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   [2]   Outpatient Medications Marked as Taking for the 6/2/25 encounter (Office Visit) with Татьяна Horne MD   Medication Sig    PANTOPRAZOLE 20 MG Oral Tab EC TAKE 1 TABLET(20 MG) BY MOUTH EVERY MORNING BEFORE BREAKFAST    AMLODIPINE 5 MG Oral Tab TAKE 1 TABLET BY MOUTH IN THE MORNING AND AT NIGHT    LOSARTAN 50 MG Oral Tab TAKE 1 TABLET(50 MG) BY MOUTH DAILY    ATORVASTATIN 80 MG Oral Tab TAKE 1 TABLET(80 MG) BY MOUTH EVERY NIGHT    levothyroxine 75 MCG Oral Tab TAKE 1 TABLET(75 MCG) BY MOUTH BEFORE BREAKFAST    diclofenac 1 % External Gel Apply 2 g topically 4 (four) times daily as needed. Generic ok    Cyanocobalamin (B-12 OR) Take by mouth daily.    acetaminophen 500 MG Oral Tab Take 1 tablet (500 mg total) by mouth every 6 (six) hours as needed for Pain. As needed    Cholecalciferol (VITAMIN D OR) Take 1,000 Units by mouth every morning.

## 2025-06-02 ENCOUNTER — OFFICE VISIT (OUTPATIENT)
Dept: INTERNAL MEDICINE CLINIC | Facility: CLINIC | Age: 80
End: 2025-06-02
Payer: MEDICARE

## 2025-06-02 VITALS
WEIGHT: 168.81 LBS | RESPIRATION RATE: 16 BRPM | TEMPERATURE: 98 F | HEART RATE: 76 BPM | HEIGHT: 64.5 IN | BODY MASS INDEX: 28.47 KG/M2 | SYSTOLIC BLOOD PRESSURE: 136 MMHG | DIASTOLIC BLOOD PRESSURE: 80 MMHG

## 2025-06-02 DIAGNOSIS — Z00.00 ENCOUNTER FOR ANNUAL HEALTH EXAMINATION: ICD-10-CM

## 2025-06-02 DIAGNOSIS — Z85.44 HISTORY OF CANCER OF FALLOPIAN TUBE IN ADULTHOOD: ICD-10-CM

## 2025-06-02 DIAGNOSIS — N39.46 MIXED STRESS AND URGE URINARY INCONTINENCE: ICD-10-CM

## 2025-06-02 DIAGNOSIS — E78.00 HYPERCHOLESTEROLEMIA: ICD-10-CM

## 2025-06-02 DIAGNOSIS — Z12.31 ENCOUNTER FOR SCREENING MAMMOGRAM FOR MALIGNANT NEOPLASM OF BREAST: ICD-10-CM

## 2025-06-02 DIAGNOSIS — I25.10 NON-OCCLUSIVE CORONARY ARTERY DISEASE: ICD-10-CM

## 2025-06-02 DIAGNOSIS — G89.29 CHRONIC PAIN OF LEFT KNEE: ICD-10-CM

## 2025-06-02 DIAGNOSIS — Z85.43 HISTORY OF OVARIAN CANCER: ICD-10-CM

## 2025-06-02 DIAGNOSIS — R73.03 PRE-DIABETES: ICD-10-CM

## 2025-06-02 DIAGNOSIS — R09.89 LABILE HYPERTENSION: ICD-10-CM

## 2025-06-02 DIAGNOSIS — E03.9 HYPOTHYROIDISM, UNSPECIFIED TYPE: ICD-10-CM

## 2025-06-02 DIAGNOSIS — M25.562 CHRONIC PAIN OF LEFT KNEE: ICD-10-CM

## 2025-06-02 DIAGNOSIS — N32.81 OAB (OVERACTIVE BLADDER): ICD-10-CM

## 2025-06-02 DIAGNOSIS — J30.89 NON-SEASONAL ALLERGIC RHINITIS, UNSPECIFIED TRIGGER: ICD-10-CM

## 2025-06-02 DIAGNOSIS — L98.9 SKIN LESION: ICD-10-CM

## 2025-06-02 PROBLEM — Q82.8 POROKERATOSIS: Status: RESOLVED | Noted: 2019-05-22 | Resolved: 2025-06-02

## 2025-06-02 PROBLEM — M25.552 LEFT HIP PAIN: Status: RESOLVED | Noted: 2019-02-01 | Resolved: 2025-06-02

## 2025-06-19 ENCOUNTER — OFFICE VISIT (OUTPATIENT)
Dept: INTERNAL MEDICINE CLINIC | Facility: CLINIC | Age: 80
End: 2025-06-19
Payer: MEDICARE

## 2025-06-19 VITALS
HEIGHT: 64.5 IN | HEART RATE: 77 BPM | SYSTOLIC BLOOD PRESSURE: 130 MMHG | WEIGHT: 168.63 LBS | BODY MASS INDEX: 28.44 KG/M2 | RESPIRATION RATE: 16 BRPM | DIASTOLIC BLOOD PRESSURE: 70 MMHG | OXYGEN SATURATION: 98 % | TEMPERATURE: 98 F

## 2025-06-19 DIAGNOSIS — I83.90 VARICOSE VEINS: ICD-10-CM

## 2025-06-19 DIAGNOSIS — E55.9 VITAMIN D DEFICIENCY: ICD-10-CM

## 2025-06-19 DIAGNOSIS — G62.9 NEUROPATHY: Primary | ICD-10-CM

## 2025-06-19 DIAGNOSIS — I10 PRIMARY HYPERTENSION: ICD-10-CM

## 2025-06-19 DIAGNOSIS — E53.8 VITAMIN B12 DEFICIENCY: ICD-10-CM

## 2025-06-19 PROCEDURE — G2211 COMPLEX E/M VISIT ADD ON: HCPCS | Performed by: STUDENT IN AN ORGANIZED HEALTH CARE EDUCATION/TRAINING PROGRAM

## 2025-06-19 PROCEDURE — 99214 OFFICE O/P EST MOD 30 MIN: CPT | Performed by: STUDENT IN AN ORGANIZED HEALTH CARE EDUCATION/TRAINING PROGRAM

## 2025-06-19 NOTE — PROGRESS NOTES
CHIEF COMPLAINT:   Chief Complaint   Patient presents with    Other     Right Leg \"Throbbing\" Sensation - happens when standing or walking, comes and goes quickly, no swelling, no injuries    Going on for about 4 days now       HPI:   Yi Diaz is a 80 year old female who presents for throbbing sensation in the right leg.     Wt Readings from Last 6 Encounters:   06/19/25 168 lb 9.6 oz (76.5 kg)   06/02/25 168 lb 12.8 oz (76.6 kg)   04/03/25 166 lb (75.3 kg)   03/12/25 169 lb (76.7 kg)   01/31/25 169 lb 4.8 oz (76.8 kg)   12/18/24 172 lb (78 kg)     Body mass index is 28.49 kg/m².     History of Present Illness  Yi Diaz is a 80 year old female who presents with a new throbbing sensation in her leg in a specific spot on the anterolateral aspect of the leg right below the knee. In this same location she notes \"numbness\"/decreased senation.    She experiences a throbbing and pulsing sensation in her leg that is intermittent and not painful. This sensation has been present for a few days and occurs when she stands up after getting out of bed. It has not significantly limited her daily activities, but she has avoided walking to prevent aggravation. She has a longstanding area of numbness in her leg, which is localized to a small patch and does not come and go like the throbbing sensation. No history of shingles in the area.    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 She is on levothyroxine for low thyroid function, and her thyroid function was last checked in May. She recently started taking vitamin B12 supplements after being found deficient in June 2024. She also takes vitamin D, 1000 IU daily, and has not had her vitamin D levels checked recently.    She has experienced weight loss of four to five pounds, which she attributes to changes in her lifestyle following her 's passing six years ago. She is  on medication for acid reflux, which has improved her symptoms of needing to clear her throat constantly.    No shortness of breath, chest pain, or tingling around the mouth. She is not a vegetarian and does not have a history of low potassium. She has varicose veins and experiences bouts of swelling in her legs when standing or walking for too long.       Current Medications[1]   Past Medical History[2]   Past Surgical History[3]   Family History[4]   Social History:   Short Social Hx on File[5]       REVIEW OF SYSTEMS:   Negative except for what is mentioned in HPI.     Screenings:   1.    2.    3.    4.    5.    6.    7.    8.    9.             EXAM:   /70 (BP Location: Right arm, Patient Position: Sitting, Cuff Size: adult)   Pulse 77   Temp 97.6 °F (36.4 °C) (Temporal)   Resp 16   Ht 5' 4.5\" (1.638 m)   Wt 168 lb 9.6 oz (76.5 kg)   SpO2 98%   BMI 28.49 kg/m²   Body mass index is 28.49 kg/m².   GENERAL: well developed, well nourished,in no apparent distress  SKIN: no rashes,no suspicious lesions  EXTREMITIES: no cyanosis, clubbing or edema    Physical Exam  EXTREMITIES: No pain or swelling in the extremities.  NEUROLOGICAL: Sensation intact bilaterally with duller sensation noted in a specific area on the R anterolateral region of the leg under the knee. No pain with palpation of calf. No discoloration of the leg. Varicose veins present and slight varicose vein noted in the area of pressure she is describing. But it is not engorged.     Labs:   Lab Results   Component Value Date/Time    WBC 8.1 05/31/2025 08:18 AM    HGB 12.1 05/31/2025 08:18 AM    .0 05/31/2025 08:18 AM      Lab Results   Component Value Date/Time    GLU 96 05/31/2025 08:18 AM     05/31/2025 08:18 AM    K 4.2 05/31/2025 08:18 AM     05/31/2025 08:18 AM    CO2 31.0 05/31/2025 08:18 AM    CREATSERUM 0.90 05/31/2025 08:18 AM    CA 10.0 05/31/2025 08:18 AM    ALB 4.3 05/31/2025 08:18 AM    TP 7.9 05/31/2025 08:18 AM     ALKPHO 70 05/31/2025 08:18 AM    AST 21 05/31/2025 08:18 AM    ALT 14 05/31/2025 08:18 AM    BILT 0.6 05/31/2025 08:18 AM    TSH 2.780 05/31/2025 08:18 AM        Lab Results   Component Value Date/Time    CHOLEST 169 05/31/2025 08:18 AM    HDL 77 (H) 05/31/2025 08:18 AM    TRIG 54 05/31/2025 08:18 AM    LDL 81 05/31/2025 08:18 AM    NONHDLC 92 05/31/2025 08:18 AM       Lab Results   Component Value Date/Time    A1C 6.0 (H) 05/31/2025 08:18 AM      Lab Results   Component Value Date    VITD 35.8 10/04/2016         Imaging:   No results found.    Assessment & Plan  //Localized Neuropathy vs. Varicose vein  Intermittent throbbing and numbness in a localized area of the leg for a few days, with numbness persisting longer. Symptoms do not limit daily activities. Differential diagnosis includes neuropathy due to nerve compression, possibly related to age-related degeneration or arthritis. Low suspicion for DVT due to absence of leg swelling, pain, or shortness of breath. Neuropathy related to previous chemotherapy is noted, but new changes are unlikely unless triggered by another factor. Vitamin B12 and vitamin D deficiencies are considered potential contributors. Discussed EMG study to confirm diagnosis, but she prefers to monitor symptoms without further testing unless they worsen.  - Order vitamin B12 and vitamin D levels  - Advise use of over-the-counter capsaicin cream for symptomatic relief  - Monitor symptoms and return if they worsen or become bothersome    //Vitamin B12 Deficiency  Vitamin B12 deficiency diagnosed in June 2024. She is currently taking vitamin B12 supplements. No recent recheck of levels since starting supplementation.  - Order vitamin B12 level    //Vitamin D Deficiency  Potential vitamin D deficiency considered as a contributing factor to neuropathy. She is taking 1000 IU of vitamin D daily, but recent levels have not been checked.  - Order vitamin D level    //Prediabetes  Prediabetes with  A1c at 6.0%. She has maintained this level for several years and is making lifestyle changes to prevent progression to diabetes.    //Hypothyroidism  Hypothyroidism managed with levothyroxine. Recent thyroid function tests in May 2025 were normal, indicating good control.    //Gastroesophageal Reflux Disease (GERD)  GERD managed with acid reflux medication, improving symptoms of throat clearing by 75%.    //HTN   Brought home BP readings, averaging out to less than 130/80. Average readings are 126/80. Ok to continue monitor and no changes in bp medication. I did discuss that if BP does get uncontorlled again, I would recommend daily dosing of amlodipine 10mg instead of 5mg BID dosing to give her the optimal blood pressure management as the half life of the morning dose decreases by the time she takes the PM dose.     Goals of Care  She prefers minimal intervention and is reassured by the low likelihood of DVT. Prefers to monitor symptoms rather than pursue further diagnostic testing unless symptoms worsen. She is concerned about being alone and wants to ensure she can manage her health independently.    Follow-up  Monitor symptoms and return if they worsen or become bothersome. No immediate follow-up appointment scheduled.    Recording duration: 15 minutes    Return if symptoms worsen or fail to improve.    Sandra Rouse MD   Internal Medicine            The following individual(s) verbally consented to be recorded using ambient AI listening technology and understand that they can each withdraw their consent to this listening technology at any point by asking the clinician to turn off or pause the recording:    Patient name: Yi Diaz             [1]   Current Outpatient Medications   Medication Sig Dispense Refill    PANTOPRAZOLE 20 MG Oral Tab EC TAKE 1 TABLET(20 MG) BY MOUTH EVERY MORNING BEFORE BREAKFAST 90 tablet 0    AMLODIPINE 5 MG Oral Tab TAKE 1 TABLET BY MOUTH IN THE MORNING AND AT NIGHT 180 tablet 0     LOSARTAN 50 MG Oral Tab TAKE 1 TABLET(50 MG) BY MOUTH DAILY 90 tablet 0    ATORVASTATIN 80 MG Oral Tab TAKE 1 TABLET(80 MG) BY MOUTH EVERY NIGHT 90 tablet 0    levothyroxine 75 MCG Oral Tab TAKE 1 TABLET(75 MCG) BY MOUTH BEFORE BREAKFAST 90 tablet 1    diclofenac 1 % External Gel Apply 2 g topically 4 (four) times daily as needed. Generic ok 100 g 1    Cyanocobalamin (B-12 OR) Take by mouth daily.      acetaminophen 500 MG Oral Tab Take 1 tablet (500 mg total) by mouth every 6 (six) hours as needed for Pain. As needed      Cholecalciferol (VITAMIN D OR) Take 1,000 Units by mouth every morning.       [2]   Past Medical History:   Arthritis    Belching    Cancer (HCC)    Cataract    Constipation    Diarrhea, unspecified    Essential hypertension    Fallopian tube cancer, carcinoma (HCC)    Stage 1C papillary serous    Flatulence/gas pain/belching    Fracture of right foot    Frequent urination    Hearing loss    Herniated disc    Relieved with KENDALL x 1    High cholesterol    Hyperlipidemia    Hypothyroidism    Irregular bowel habits    L3-4 grade 1 unstable spondylolisthesis    Loss of appetite    Migraine    Nausea    Osteoarthritis    Sleep disturbance    Uncomfortable fullness after meals    Vasovagal syncope    Vomiting    Wears glasses   [3]   Past Surgical History:  Procedure Laterality Date    Cataract  ,    Bilateral-then redo on L. Dr. Garcia    Chemotherapy  6 treatments    summer 2015 fallopian tube cancer    Colonoscopy  ?    Glaucoma surgery  ?    Hysterectomy  2015    stage 1c, fallopian tube cancer, Dr. Villa, robotic, EDW      1967, 1969,     Oophorectomy      Other surgical history      bilateral cataract surgery    Other surgical history      arthritic cyst removed from finger    Other surgical history      fatty cyst removed from arm   [4]   Family History  Problem Relation Age of Onset    Other (Heart failure) Mother          at 76    Heart Disease Mother      Stroke Father          at 43 from cerebral hemorrhage    Hypertension Father         Due to kidney failure    Other (Other) Father         kidney disease    Heart Disorder Maternal Grandfather     Other (Diverticulitis) Other         , CHF, AF   [5]   Social History  Socioeconomic History    Marital status:      Spouse name: Conner Roberts)   Occupational History    Occupation: Homemaker   Tobacco Use    Smoking status: Never     Passive exposure: Never    Smokeless tobacco: Never    Tobacco comments:     No 2nd hand exposure   Vaping Use    Vaping status: Never Used   Substance and Sexual Activity    Alcohol use: Yes     Comment: 1 glass a wine nightly with dinner    Drug use: No   Other Topics Concern    Caffeine Concern Yes     Comment: coffee    Stress Concern No    Weight Concern No    Special Diet No    Exercise No    Seat Belt Yes   Social History Narrative    The patient does not use an assistive device..      The patient does live in a home with stairs.     Social Drivers of Health     Food Insecurity: No Food Insecurity (2025)    NCSS - Food Insecurity     Worried About Running Out of Food in the Last Year: No     Ran Out of Food in the Last Year: No   Transportation Needs: No Transportation Needs (2025)    NCSS - Transportation     Lack of Transportation: No   Housing Stability: Not At Risk (2025)    NCSS - Housing/Utilities     Has Housing: Yes     Worried About Losing Housing: No     Unable to Get Utilities: No

## 2025-06-19 NOTE — PATIENT INSTRUCTIONS
VISIT SUMMARY:  During your visit, we discussed the new throbbing sensation in your leg, reviewed your history of prediabetes, neuropathy, hypothyroidism, vitamin B12 and D deficiencies, and GERD. We also talked about your recent weight loss and overall health maintenance.    YOUR PLAN:  -LOCALIZED NEUROPATHY: Localized neuropathy means there is nerve damage in a specific area of your leg, causing the throbbing and numbness. This could be due to nerve compression or age-related changes. We will monitor your symptoms and you can use over-the-counter capsaicin cream for relief. We will check your vitamin B12 and D levels to see if deficiencies are contributing to your symptoms. If your symptoms worsen, please return for further evaluation.    -VITAMIN B12 DEFICIENCY: Vitamin B12 deficiency can cause nerve problems and other health issues. You are already taking supplements, and we will recheck your levels to ensure they are improving.    -VITAMIN D DEFICIENCY: Vitamin D deficiency can also contribute to nerve problems. You are taking 1000 IU of vitamin D daily, and we will check your levels to make sure they are adequate.    -PREDIABETES: Prediabetes means your blood sugar levels are higher than normal but not high enough to be classified as diabetes. Your A1c is at 6.0%, and you are making lifestyle changes to prevent progression to diabetes.    -HYPOTHYROIDISM: Hypothyroidism means your thyroid gland is not producing enough thyroid hormone. You are taking levothyroxine, and your recent tests show that your thyroid levels are well-controlled.    -GASTROESOPHAGEAL REFLUX DISEASE (GERD): GERD is a condition where stomach acid frequently flows back into the tube connecting your mouth and stomach, causing irritation. Your medication has improved your symptoms by 75%.    -HTN home bp readings well controlled. NO medication changes needed at this time.     INSTRUCTIONS:  We will monitor your symptoms and check your vitamin  B12 and D levels. Please return if your symptoms worsen or become bothersome. No immediate follow-up appointment is scheduled.    Contains text generated by Candace

## 2025-06-20 ENCOUNTER — MED REC SCAN ONLY (OUTPATIENT)
Dept: INTERNAL MEDICINE CLINIC | Facility: CLINIC | Age: 80
End: 2025-06-20

## 2025-07-12 DIAGNOSIS — R09.89 LABILE HYPERTENSION: ICD-10-CM

## 2025-07-12 DIAGNOSIS — R09.89 CHRONIC THROAT CLEARING: ICD-10-CM

## 2025-07-14 RX ORDER — LOSARTAN POTASSIUM 50 MG/1
50 TABLET ORAL DAILY
Qty: 90 TABLET | Refills: 0 | Status: SHIPPED | OUTPATIENT
Start: 2025-07-14

## 2025-07-14 NOTE — TELEPHONE ENCOUNTER
Hypertension Medications Protocol [id number]/12/2025 09:30 AM   Protocol Details CMP or BMP in past 12 months    Last BP reading less than 140/90    In person appointment or virtual visit in the past 12 mos or appointment in next 3 mos    EGFRCR or GFRNAA > 50    Medication is active on med list      //HTN   Brought home BP readings, averaging out to less than 130/80. Average readings are 126/80. Ok to continue monitor and no changes in bp medication. I did discuss that if BP does get uncontorlled again, I would recommend daily dosing of amlodipine 10mg instead of 5mg BID dosing to give her the optimal blood pressure management as the half life of the morning dose decreases by the time she takes the PM dose.   Future Appointments   Date Time Provider Department Center   12/2/2025  9:20 AM Татьяна Horne MD [facility] [address] [facility]

## 2025-07-25 DIAGNOSIS — K21.9 GASTROESOPHAGEAL REFLUX DISEASE, UNSPECIFIED WHETHER ESOPHAGITIS PRESENT: ICD-10-CM

## 2025-07-25 DIAGNOSIS — R09.89 LABILE HYPERTENSION: ICD-10-CM

## 2025-07-29 RX ORDER — PANTOPRAZOLE SODIUM 20 MG/1
20 TABLET, DELAYED RELEASE ORAL
Qty: 90 TABLET | Refills: 1 | Status: SHIPPED | OUTPATIENT
Start: 2025-07-29

## 2025-07-29 RX ORDER — AMLODIPINE BESYLATE 5 MG/1
5 TABLET ORAL 2 TIMES DAILY
Qty: 180 TABLET | Refills: 1 | Status: SHIPPED | OUTPATIENT
Start: 2025-07-29

## 2025-08-25 DIAGNOSIS — E03.9 HYPOTHYROIDISM, UNSPECIFIED TYPE: ICD-10-CM

## 2025-08-25 DIAGNOSIS — E78.00 HYPERCHOLESTEROLEMIA: ICD-10-CM

## 2025-08-27 RX ORDER — ATORVASTATIN CALCIUM 80 MG/1
80 TABLET, FILM COATED ORAL NIGHTLY
Qty: 90 TABLET | Refills: 0 | Status: SHIPPED | OUTPATIENT
Start: 2025-08-27

## 2025-08-27 RX ORDER — LEVOTHYROXINE SODIUM 75 UG/1
75 TABLET ORAL
Qty: 90 TABLET | Refills: 1 | Status: SHIPPED | OUTPATIENT
Start: 2025-08-27

## (undated) DIAGNOSIS — R09.89 LABILE HYPERTENSION: ICD-10-CM

## (undated) DIAGNOSIS — K21.9 GASTROESOPHAGEAL REFLUX DISEASE, UNSPECIFIED WHETHER ESOPHAGITIS PRESENT: ICD-10-CM

## (undated) NOTE — LETTER
11/18/19        Yi FELICIANO [facility]  [address] [address]      Dear Kayleigh Mancuso,    [facility] records indicate that you have outstanding lab work and or testing that was ordered for you and has not yet been completed:  Orders Placed This Encounter

## (undated) NOTE — MR AVS SNAPSHOT
[facility] [address], [address] [address] [phone]               Thank you for choosing us for your health care visit with Ana Bolton MD.  We are glad to serve you and happy to provide you with this Take 1 tablet (75 mcg total) by mouth daily. Commonly known as:  SYNTHROID, LEVOTHROID           lisinopril 40 MG Tabs   Take 1 tablet (40 mg total) by mouth daily.    Commonly known as:  PRINIVIL,ZESTRIL           Metoprolol Succinate ER 50 MG Tb24   Riley Eat plenty of low-fat dairy products High fat meats and dairy   Choose whole grain products Foods high in sodium   Water is best for hydration Fast food.    Eat at home when possible     Tips for increasing your physical activity – Adults who are physically

## (undated) NOTE — LETTER
09/15/17        Yi FELICIANO [facility]  [address] [id number]    3/26/1945    Dear Lizette Mancilla,    [address] [facility] records indicate that you have outstanding lab work and or testing that was ordered for you and has not yet been completed:          Comp Metabolic Pa

## (undated) NOTE — LETTER
AUTHORIZATION FOR SURGICAL OPERATION OR OTHER PROCEDURE    1. I hereby authorize Dr. Xi Browning, and [address] [place] [address] staff assigned to my case to perform the following operation and/or procedure at the [address] [place] [address]:    _______________________________________________________________________________________________    Cortisone Injection Right foot  _______________________________________________________________________________________________    2. My physician has explained the nature and purpose of the operation or other procedure, possible alternative methods of treatment, the risks involved, and the possibility of complication to me. I acknowledge that no guarantee has been made as to the result that may be obtained. 3.  I recognize that, during the course of this operation, or other procedure, unforseen conditions may necessitate additional or different procedure than those listed above. I, therefore, further authorize and request that the above named physician, his/her physician assistants or designees perform such procedures as are, in his/her professional opinion, necessary and desirable. 4.  Any tissue or organs removed in the operation or other procedure may be disposed of by and at the discretion of the [address] [place] [address] and [facility]. 5.  I understand that in the event of a medical emergency, I will be transported by local paramedics to [facility] or other hospital emergency department. 6.  I certify that I have read and fully understand the above consent to operation and/or other procedure. 7.  I acknowledge that my physician has explained sedation/analgesia administration to me including the risks and benefits. I consent to the administration of sedation/analgesia as may be necessary or desirable in the judgement of my physician.     Witness signature: ___________________________________________________ Date:  ______/______/_____ Time:  ________ A. M.  P.M. Patient Name:  ______________________________________________________  (please print)      Patient signature:  ___________________________________________________             Relationship to Patient:           []  Parent    Responsible person                          []  Spouse  In case of minor or                    [] Other  _____________   Incompetent name:  __________________________________________________                               (please print)      _____________      Responsible person  In case of minor or  Incompetent signature:  _______________________________________________    Statement of Physician  My signature below affirms that prior to the time of the procedure, I have explained to the patient and/or his/her guardian, the risks and benefits involved in the proposed treatment and any reasonable alternative to the proposed treatment. I have also explained the risks and benefits involved in the refusal of the proposed treatment and have answered the patient's questions.                         Date:  ______/______/_______  Provider                      Signature:  __________________________________________________________       Time:  ___________ A.M    P.M.

## (undated) NOTE — LETTER
AUTHORIZATION FOR SURGICAL OPERATION OR OTHER PROCEDURE    1.  I [facility] K[facility]ie Schreiber[facility] my case to perform the following operation and/or procedure at the [facility]:    __________________________________ ________ A. M.  P.M.        Patient Name:  ______________________________________________________  (please print)      Patient signature:  ___________________________________________________             Relationship to Patient:           []  Parent    Respon

## (undated) NOTE — LETTER
01/25/22        Yi FELICIANO [facility]  [address] [address] [id number]      Dear Prasanna Martin,    This is a friendly reminder to let you know it's time to schedule your Bone Density scan.   To schedule an appointment, please call Central Scheduling at [phone]

## (undated) NOTE — LETTER
05/21/20        Yi FELICIANO [facility]  [address] [facility] [id number]      Dear Jewel Spaulding,    [address] records indicate that you have outstanding lab work and or testing that was ordered for you and has not yet been completed:  Orders Placed This Encounter      Co